# Patient Record
Sex: FEMALE | Race: BLACK OR AFRICAN AMERICAN | NOT HISPANIC OR LATINO | Employment: UNEMPLOYED | URBAN - METROPOLITAN AREA
[De-identification: names, ages, dates, MRNs, and addresses within clinical notes are randomized per-mention and may not be internally consistent; named-entity substitution may affect disease eponyms.]

---
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
## 2021-02-11 ENCOUNTER — HOSPITAL ENCOUNTER (OUTPATIENT)
Dept: LAB | Facility: MEDICAL CENTER | Age: 21
End: 2021-02-11
Attending: INTERNAL MEDICINE
Payer: MEDICAID

## 2021-02-11 LAB
ALBUMIN SERPL BCP-MCNC: 4 G/DL (ref 3.2–4.9)
ALBUMIN/GLOB SERPL: 1.3 G/DL
ALP SERPL-CCNC: 65 U/L (ref 30–99)
ALT SERPL-CCNC: 19 U/L (ref 2–50)
ANION GAP SERPL CALC-SCNC: 10 MMOL/L (ref 7–16)
AST SERPL-CCNC: 25 U/L (ref 12–45)
BILIRUB SERPL-MCNC: 1 MG/DL (ref 0.1–1.5)
BUN SERPL-MCNC: 4 MG/DL (ref 8–22)
CALCIUM SERPL-MCNC: 9 MG/DL (ref 8.5–10.5)
CHLORIDE SERPL-SCNC: 106 MMOL/L (ref 96–112)
CO2 SERPL-SCNC: 21 MMOL/L (ref 20–33)
CREAT SERPL-MCNC: 0.41 MG/DL (ref 0.5–1.4)
GLOBULIN SER CALC-MCNC: 3 G/DL (ref 1.9–3.5)
GLUCOSE SERPL-MCNC: 86 MG/DL (ref 65–99)
LDH SERPL L TO P-CCNC: 323 U/L (ref 107–266)
POTASSIUM SERPL-SCNC: 4.1 MMOL/L (ref 3.6–5.5)
PROT SERPL-MCNC: 7 G/DL (ref 6–8.2)
SODIUM SERPL-SCNC: 137 MMOL/L (ref 135–145)

## 2021-02-11 PROCEDURE — 36415 COLL VENOUS BLD VENIPUNCTURE: CPT

## 2021-02-11 PROCEDURE — 83021 HEMOGLOBIN CHROMOTOGRAPHY: CPT

## 2021-02-11 PROCEDURE — 80053 COMPREHEN METABOLIC PANEL: CPT

## 2021-02-11 PROCEDURE — 84703 CHORIONIC GONADOTROPIN ASSAY: CPT

## 2021-02-11 PROCEDURE — 83615 LACTATE (LD) (LDH) ENZYME: CPT

## 2021-02-12 LAB — HCG SERPL QL: POSITIVE

## 2021-02-13 LAB
HGB A1 MFR BLD: 0 % (ref 95–97.9)
HGB A2 MFR BLD: 3.6 % (ref 2–3.5)
HGB C MFR BLD: 0 % (ref 0–0)
HGB E MFR BLD: 0 % (ref 0–0)
HGB F MFR BLD: 10.1 % (ref 0–2.1)
HGB FRACT BLD ELPH-IMP: ABNORMAL
HGB OTHER MFR BLD: 1.4 % (ref 0–0)
HGB S BLD QL SOLY: ABNORMAL
HGB S MFR BLD: 84.9 % (ref 0–0)
PATH INTERP BLD-IMP: ABNORMAL

## 2021-03-03 ENCOUNTER — HOSPITAL ENCOUNTER (EMERGENCY)
Facility: MEDICAL CENTER | Age: 21
End: 2021-03-03
Attending: EMERGENCY MEDICINE | Admitting: EMERGENCY MEDICINE
Payer: MEDICAID

## 2021-03-03 ENCOUNTER — APPOINTMENT (OUTPATIENT)
Dept: RADIOLOGY | Facility: MEDICAL CENTER | Age: 21
End: 2021-03-03
Attending: EMERGENCY MEDICINE
Payer: MEDICAID

## 2021-03-03 VITALS
RESPIRATION RATE: 16 BRPM | HEIGHT: 62 IN | WEIGHT: 129.85 LBS | OXYGEN SATURATION: 98 % | HEART RATE: 65 BPM | DIASTOLIC BLOOD PRESSURE: 70 MMHG | BODY MASS INDEX: 23.9 KG/M2 | TEMPERATURE: 98.9 F | SYSTOLIC BLOOD PRESSURE: 111 MMHG

## 2021-03-03 DIAGNOSIS — Z3A.12 12 WEEKS GESTATION OF PREGNANCY: ICD-10-CM

## 2021-03-03 DIAGNOSIS — S39.012A STRAIN OF LUMBAR REGION, INITIAL ENCOUNTER: ICD-10-CM

## 2021-03-03 LAB
APPEARANCE UR: CLEAR
COLOR UR: YELLOW
GLUCOSE UR STRIP.AUTO-MCNC: NEGATIVE MG/DL
KETONES UR STRIP.AUTO-MCNC: NEGATIVE MG/DL
LEUKOCYTE ESTERASE UR QL STRIP.AUTO: NEGATIVE
NITRITE UR QL STRIP.AUTO: NEGATIVE
PH UR STRIP.AUTO: 5.5 [PH] (ref 5–8)
PROT UR QL STRIP: NEGATIVE MG/DL
RBC UR QL AUTO: NEGATIVE
SP GR UR STRIP.AUTO: 1.01 (ref 1–1.03)

## 2021-03-03 PROCEDURE — 99283 EMERGENCY DEPT VISIT LOW MDM: CPT

## 2021-03-03 PROCEDURE — 81002 URINALYSIS NONAUTO W/O SCOPE: CPT

## 2021-03-03 PROCEDURE — 76801 OB US < 14 WKS SINGLE FETUS: CPT

## 2021-03-03 RX ORDER — FLUTICASONE PROPIONATE 50 MCG
1 SPRAY, SUSPENSION (ML) NASAL 2 TIMES DAILY
Status: SHIPPED | COMMUNITY
End: 2022-08-04

## 2021-03-03 ASSESSMENT — FIBROSIS 4 INDEX: FIB4 SCORE: 0.35

## 2021-03-03 NOTE — ED PROVIDER NOTES
"ED Provider Note    CHIEF COMPLAINT  Chief Complaint   Patient presents with   • Pregnancy   • Back Pain       HPI  Henry Anand is a 20 y.o. female who presents here with a chief complaint of pregnancy and back pain.  She states her last period was in December, she does not know how far along she is.  She started having lower back pain it is not associated with trauma fever or dysuria.  She also has a history of gallstones and sickle cell anemia.  She currently smokes marijuana denies any other illicit drug use    REVIEW OF SYSTEMS  Positive for back pain, Negative for fever chills or vomiting, all other symtoms are negative  PAST MEDICAL HISTORY   has a past medical history of Gallstones with biliary obstruction (11/2014) and Sickle cell anemia without crisis (HCC) (2001).    SOCIAL HISTORY  Social History     Tobacco Use   • Smoking status: Former Smoker   • Smokeless tobacco: Never Used   Substance and Sexual Activity   • Alcohol use: Not Currently   • Drug use: Not Currently     Types: Inhaled     Comment: marijauna   • Sexual activity: Not on file       SURGICAL HISTORY   has a past surgical history that includes cholecystectomy (11/3/2014) and ercp (N/A, 10/30/2014).    CURRENT MEDICATIONS  Reviewed.  See Encounter Summary.      ALLERGIES  No Known Allergies    PHYSICAL EXAM  VITAL SIGNS: /56   Pulse 66   Temp 37.2 °C (98.9 °F) (Temporal)   Resp 16   Ht 1.575 m (5' 2\")   Wt 58.9 kg (129 lb 13.6 oz)   LMP 07/31/2020 (Exact Date)   SpO2 97%   BMI 23.75 kg/m²   Constitutional:  Alert in no apparent distress.  HENT: Normocephalic, Bilateral external ears normal. Nose normal.   Eyes: Pupils are equal and reactive. Conjunctiva normal, non-icteric.   Thorax & Lungs: Easy unlabored respirations  Abdomen:  No gross signs of peritonitis, no pain with movement, gravid abdomen  Skin: Visualized skin is  Dry, No erythema, No rash.   Extremities:   No edema, No asymmetry  Neurologic: Alert, Grossly " non-focal.   Psychiatric: Affect and Mood normal      COURSE & MEDICAL DECISION MAKING  Nursing notes and vital signs were reviewed. (See chart for details)    The patient presents to the Emergency Department with request to find out how far along her pregnancy is, ultrasound will be obtained, we will also check a urinalysis to make sure she has no evidence of pyelonephritis or infection    US-OB 1ST TRIMESTER WITH TRANSVAGINAL (COMBO)    (Results Pending)   Patient has 12-week intrauterine pregnancy per ultrasound tech  Results for orders placed or performed during the hospital encounter of 03/03/21   POC UA   Result Value Ref Range    POC Color Yellow     POC Appearance Clear     POC Glucose Negative Negative mg/dL    POC Ketones Negative Negative mg/dL    POC Specific Gravity 1.010 1.005 - 1.030    POC Blood Negative Negative    POC Urine PH 5.5 5.0 - 8.0    POC Protein Negative Negative mg/dL    POC Nitrites Negative Negative    POC Leukocyte Esterase Negative Negative         .       The patient was discharged home with an information sheet on pregnancy and follow-up instructions and told to return immediately for any signs or symptoms listed, but specifically if blood in her urine back pain vomiting, or any worsening at all.  The patient verbally agreed to the discharge precautions and follow-up plan which is documented in EPIC.    FINAL IMPRESSION  1.  12-week intrauterine pregnancy  2.   Back pain without evidence of kidney stone or pyelonephritis    Electronically signed by: Claire Salomon M.D., 3/3/2021 12:11 PM

## 2021-03-03 NOTE — ED TRIAGE NOTES
Chief Complaint   Patient presents with   • Pregnancy   • Back Pain      pt complains of bilateral back pain with denies vaginal bleeding. Pt states she recently found out she was pregnant , does not know her INGE.

## 2021-03-03 NOTE — ED NOTES
Med rec updated and complete  Allergies reviewed  Interviewed pt with boyfriend at bedside with permission from pt..

## 2021-04-29 ENCOUNTER — PRE-ADMISSION TESTING (OUTPATIENT)
Dept: ADMISSIONS | Facility: MEDICAL CENTER | Age: 21
End: 2021-04-29
Attending: OTOLARYNGOLOGY
Payer: MEDICAID

## 2021-04-29 RX ORDER — VITAMIN B COMPLEX
1000 TABLET ORAL DAILY
COMMUNITY
End: 2021-10-27 | Stop reason: SDUPTHER

## 2021-04-30 ENCOUNTER — PRE-ADMISSION TESTING (OUTPATIENT)
Dept: ADMISSIONS | Facility: MEDICAL CENTER | Age: 21
End: 2021-04-30
Attending: OTOLARYNGOLOGY
Payer: MEDICAID

## 2021-04-30 DIAGNOSIS — Z01.812 PRE-OPERATIVE LABORATORY EXAMINATION: ICD-10-CM

## 2021-04-30 LAB
COVID ORDER STATUS COVID19: NORMAL
SARS-COV-2 RNA RESP QL NAA+PROBE: NOTDETECTED
SPECIMEN SOURCE: NORMAL

## 2021-04-30 PROCEDURE — C9803 HOPD COVID-19 SPEC COLLECT: HCPCS

## 2021-04-30 PROCEDURE — U0005 INFEC AGEN DETEC AMPLI PROBE: HCPCS

## 2021-04-30 PROCEDURE — U0003 INFECTIOUS AGENT DETECTION BY NUCLEIC ACID (DNA OR RNA); SEVERE ACUTE RESPIRATORY SYNDROME CORONAVIRUS 2 (SARS-COV-2) (CORONAVIRUS DISEASE [COVID-19]), AMPLIFIED PROBE TECHNIQUE, MAKING USE OF HIGH THROUGHPUT TECHNOLOGIES AS DESCRIBED BY CMS-2020-01-R: HCPCS

## 2021-05-04 ENCOUNTER — ANESTHESIA EVENT (OUTPATIENT)
Dept: SURGERY | Facility: MEDICAL CENTER | Age: 21
End: 2021-05-04
Payer: MEDICAID

## 2021-05-04 ENCOUNTER — HOSPITAL ENCOUNTER (OUTPATIENT)
Facility: MEDICAL CENTER | Age: 21
End: 2021-05-04
Attending: OTOLARYNGOLOGY | Admitting: OTOLARYNGOLOGY
Payer: MEDICAID

## 2021-05-04 ENCOUNTER — ANESTHESIA (OUTPATIENT)
Dept: SURGERY | Facility: MEDICAL CENTER | Age: 21
End: 2021-05-04
Payer: MEDICAID

## 2021-05-04 VITALS
WEIGHT: 119.05 LBS | SYSTOLIC BLOOD PRESSURE: 110 MMHG | HEIGHT: 62 IN | BODY MASS INDEX: 21.91 KG/M2 | DIASTOLIC BLOOD PRESSURE: 66 MMHG | HEART RATE: 78 BPM | RESPIRATION RATE: 16 BRPM | TEMPERATURE: 98.1 F | OXYGEN SATURATION: 93 %

## 2021-05-04 DIAGNOSIS — G89.18 POSTOPERATIVE PAIN: ICD-10-CM

## 2021-05-04 PROBLEM — J32.4 CHRONIC PANSINUSITIS: Status: ACTIVE | Noted: 2021-05-04

## 2021-05-04 LAB
HCG UR QL: NEGATIVE
PATHOLOGY CONSULT NOTE: NORMAL

## 2021-05-04 PROCEDURE — C1894 INTRO/SHEATH, NON-LASER: HCPCS | Performed by: OTOLARYNGOLOGY

## 2021-05-04 PROCEDURE — 700111 HCHG RX REV CODE 636 W/ 250 OVERRIDE (IP): Performed by: ANESTHESIOLOGY

## 2021-05-04 PROCEDURE — A9270 NON-COVERED ITEM OR SERVICE: HCPCS | Performed by: OTOLARYNGOLOGY

## 2021-05-04 PROCEDURE — 501838 HCHG SUTURE GENERAL: Performed by: OTOLARYNGOLOGY

## 2021-05-04 PROCEDURE — 160025 RECOVERY II MINUTES (STATS): Performed by: OTOLARYNGOLOGY

## 2021-05-04 PROCEDURE — 88305 TISSUE EXAM BY PATHOLOGIST: CPT | Mod: 59

## 2021-05-04 PROCEDURE — 502573 HCHG PACK, ENT: Performed by: OTOLARYNGOLOGY

## 2021-05-04 PROCEDURE — 160046 HCHG PACU - 1ST 60 MINS PHASE II: Performed by: OTOLARYNGOLOGY

## 2021-05-04 PROCEDURE — 160048 HCHG OR STATISTICAL LEVEL 1-5: Performed by: OTOLARYNGOLOGY

## 2021-05-04 PROCEDURE — A9270 NON-COVERED ITEM OR SERVICE: HCPCS | Performed by: ANESTHESIOLOGY

## 2021-05-04 PROCEDURE — 700101 HCHG RX REV CODE 250: Performed by: ANESTHESIOLOGY

## 2021-05-04 PROCEDURE — 160036 HCHG PACU - EA ADDL 30 MINS PHASE I: Performed by: OTOLARYNGOLOGY

## 2021-05-04 PROCEDURE — 160009 HCHG ANES TIME/MIN: Performed by: OTOLARYNGOLOGY

## 2021-05-04 PROCEDURE — 500331 HCHG COTTONOID, SURG PATTIE: Performed by: OTOLARYNGOLOGY

## 2021-05-04 PROCEDURE — 160029 HCHG SURGERY MINUTES - 1ST 30 MINS LEVEL 4: Performed by: OTOLARYNGOLOGY

## 2021-05-04 PROCEDURE — 160035 HCHG PACU - 1ST 60 MINS PHASE I: Performed by: OTOLARYNGOLOGY

## 2021-05-04 PROCEDURE — 160002 HCHG RECOVERY MINUTES (STAT): Performed by: OTOLARYNGOLOGY

## 2021-05-04 PROCEDURE — 160041 HCHG SURGERY MINUTES - EA ADDL 1 MIN LEVEL 4: Performed by: OTOLARYNGOLOGY

## 2021-05-04 PROCEDURE — 88311 DECALCIFY TISSUE: CPT

## 2021-05-04 PROCEDURE — 700101 HCHG RX REV CODE 250: Performed by: OTOLARYNGOLOGY

## 2021-05-04 PROCEDURE — 700102 HCHG RX REV CODE 250 W/ 637 OVERRIDE(OP): Performed by: OTOLARYNGOLOGY

## 2021-05-04 PROCEDURE — 81025 URINE PREGNANCY TEST: CPT

## 2021-05-04 PROCEDURE — 700102 HCHG RX REV CODE 250 W/ 637 OVERRIDE(OP): Performed by: ANESTHESIOLOGY

## 2021-05-04 PROCEDURE — C1763 CONN TISS, NON-HUMAN: HCPCS | Performed by: OTOLARYNGOLOGY

## 2021-05-04 PROCEDURE — 700111 HCHG RX REV CODE 636 W/ 250 OVERRIDE (IP): Performed by: OTOLARYNGOLOGY

## 2021-05-04 PROCEDURE — 700105 HCHG RX REV CODE 258: Performed by: OTOLARYNGOLOGY

## 2021-05-04 PROCEDURE — 502000 HCHG MISC OR IMPLANTS RC 0278: Performed by: OTOLARYNGOLOGY

## 2021-05-04 DEVICE — KIT CHITOGEL ENDOSCOPIC SINUS SURGERY (1EA): Type: IMPLANTABLE DEVICE | Site: NOSE | Status: FUNCTIONAL

## 2021-05-04 RX ORDER — OXYCODONE HYDROCHLORIDE AND ACETAMINOPHEN 5; 325 MG/1; MG/1
2 TABLET ORAL
Status: COMPLETED | OUTPATIENT
Start: 2021-05-04 | End: 2021-05-04

## 2021-05-04 RX ORDER — EPINEPHRINE 1 MG/ML
INJECTION INTRAMUSCULAR; INTRAVENOUS; SUBCUTANEOUS
Status: DISCONTINUED
Start: 2021-05-04 | End: 2021-05-04 | Stop reason: HOSPADM

## 2021-05-04 RX ORDER — OXYMETAZOLINE HYDROCHLORIDE 0.05 G/100ML
SPRAY NASAL
Status: DISCONTINUED
Start: 2021-05-04 | End: 2021-05-04 | Stop reason: HOSPADM

## 2021-05-04 RX ORDER — EPINEPHRINE 1 MG/ML(1)
AMPUL (ML) INJECTION
Status: DISCONTINUED
Start: 2021-05-04 | End: 2021-05-04 | Stop reason: HOSPADM

## 2021-05-04 RX ORDER — HYDROCODONE BITARTRATE AND ACETAMINOPHEN 5; 325 MG/1; MG/1
1 TABLET ORAL EVERY 4 HOURS PRN
Qty: 15 TABLET | Refills: 0 | Status: SHIPPED | OUTPATIENT
Start: 2021-05-04 | End: 2021-05-09

## 2021-05-04 RX ORDER — CEFAZOLIN SODIUM 1 G/3ML
INJECTION, POWDER, FOR SOLUTION INTRAMUSCULAR; INTRAVENOUS PRN
Status: DISCONTINUED | OUTPATIENT
Start: 2021-05-04 | End: 2021-05-04 | Stop reason: SURG

## 2021-05-04 RX ORDER — LIDOCAINE HYDROCHLORIDE 10 MG/ML
INJECTION, SOLUTION INFILTRATION; PERINEURAL
Status: DISCONTINUED
Start: 2021-05-04 | End: 2021-05-04 | Stop reason: HOSPADM

## 2021-05-04 RX ORDER — PREDNISONE 10 MG/1
TABLET ORAL
Qty: 24 TABLET | Refills: 0 | Status: SHIPPED | OUTPATIENT
Start: 2021-05-04 | End: 2021-05-08

## 2021-05-04 RX ORDER — SODIUM CHLORIDE, SODIUM LACTATE, POTASSIUM CHLORIDE, CALCIUM CHLORIDE 600; 310; 30; 20 MG/100ML; MG/100ML; MG/100ML; MG/100ML
INJECTION, SOLUTION INTRAVENOUS CONTINUOUS
Status: DISCONTINUED | OUTPATIENT
Start: 2021-05-04 | End: 2021-05-04 | Stop reason: HOSPADM

## 2021-05-04 RX ORDER — DIPHENHYDRAMINE HYDROCHLORIDE 50 MG/ML
12.5 INJECTION INTRAMUSCULAR; INTRAVENOUS
Status: DISCONTINUED | OUTPATIENT
Start: 2021-05-04 | End: 2021-05-04 | Stop reason: HOSPADM

## 2021-05-04 RX ORDER — HYDROMORPHONE HYDROCHLORIDE 1 MG/ML
0.2 INJECTION, SOLUTION INTRAMUSCULAR; INTRAVENOUS; SUBCUTANEOUS
Status: DISCONTINUED | OUTPATIENT
Start: 2021-05-04 | End: 2021-05-04 | Stop reason: HOSPADM

## 2021-05-04 RX ORDER — HYDROMORPHONE HYDROCHLORIDE 1 MG/ML
0.4 INJECTION, SOLUTION INTRAMUSCULAR; INTRAVENOUS; SUBCUTANEOUS
Status: DISCONTINUED | OUTPATIENT
Start: 2021-05-04 | End: 2021-05-04 | Stop reason: HOSPADM

## 2021-05-04 RX ORDER — LIDOCAINE HYDROCHLORIDE AND EPINEPHRINE 10; 10 MG/ML; UG/ML
INJECTION, SOLUTION INFILTRATION; PERINEURAL
Status: DISCONTINUED | OUTPATIENT
Start: 2021-05-04 | End: 2021-05-04 | Stop reason: HOSPADM

## 2021-05-04 RX ORDER — HALOPERIDOL 5 MG/ML
1 INJECTION INTRAMUSCULAR
Status: DISCONTINUED | OUTPATIENT
Start: 2021-05-04 | End: 2021-05-04 | Stop reason: HOSPADM

## 2021-05-04 RX ORDER — SODIUM CHLORIDE, SODIUM LACTATE, POTASSIUM CHLORIDE, CALCIUM CHLORIDE 600; 310; 30; 20 MG/100ML; MG/100ML; MG/100ML; MG/100ML
INJECTION, SOLUTION INTRAVENOUS CONTINUOUS
Status: ACTIVE | OUTPATIENT
Start: 2021-05-04 | End: 2021-05-04

## 2021-05-04 RX ORDER — ROCURONIUM BROMIDE 10 MG/ML
INJECTION, SOLUTION INTRAVENOUS PRN
Status: DISCONTINUED | OUTPATIENT
Start: 2021-05-04 | End: 2021-05-04 | Stop reason: SURG

## 2021-05-04 RX ORDER — ONDANSETRON 2 MG/ML
INJECTION INTRAMUSCULAR; INTRAVENOUS PRN
Status: DISCONTINUED | OUTPATIENT
Start: 2021-05-04 | End: 2021-05-04 | Stop reason: SURG

## 2021-05-04 RX ORDER — MEPERIDINE HYDROCHLORIDE 25 MG/ML
6.25 INJECTION INTRAMUSCULAR; INTRAVENOUS; SUBCUTANEOUS
Status: DISCONTINUED | OUTPATIENT
Start: 2021-05-04 | End: 2021-05-04 | Stop reason: HOSPADM

## 2021-05-04 RX ORDER — OXYCODONE HYDROCHLORIDE AND ACETAMINOPHEN 5; 325 MG/1; MG/1
1 TABLET ORAL
Status: COMPLETED | OUTPATIENT
Start: 2021-05-04 | End: 2021-05-04

## 2021-05-04 RX ORDER — LIDOCAINE HYDROCHLORIDE 20 MG/ML
INJECTION, SOLUTION EPIDURAL; INFILTRATION; INTRACAUDAL; PERINEURAL PRN
Status: DISCONTINUED | OUTPATIENT
Start: 2021-05-04 | End: 2021-05-04 | Stop reason: SURG

## 2021-05-04 RX ORDER — HYDROMORPHONE HYDROCHLORIDE 1 MG/ML
0.1 INJECTION, SOLUTION INTRAMUSCULAR; INTRAVENOUS; SUBCUTANEOUS
Status: DISCONTINUED | OUTPATIENT
Start: 2021-05-04 | End: 2021-05-04 | Stop reason: HOSPADM

## 2021-05-04 RX ORDER — PREDNISONE 10 MG/1
TABLET ORAL
Qty: 24 TABLET | Refills: 0 | Status: SHIPPED | OUTPATIENT
Start: 2021-05-04 | End: 2021-05-04 | Stop reason: SDUPTHER

## 2021-05-04 RX ORDER — EPINEPHRINE 1 MG/ML(1)
AMPUL (ML) INJECTION
Status: DISCONTINUED | OUTPATIENT
Start: 2021-05-04 | End: 2021-05-04 | Stop reason: HOSPADM

## 2021-05-04 RX ORDER — DEXAMETHASONE SODIUM PHOSPHATE 4 MG/ML
INJECTION, SOLUTION INTRA-ARTICULAR; INTRALESIONAL; INTRAMUSCULAR; INTRAVENOUS; SOFT TISSUE PRN
Status: DISCONTINUED | OUTPATIENT
Start: 2021-05-04 | End: 2021-05-04 | Stop reason: SURG

## 2021-05-04 RX ORDER — OXYMETAZOLINE HYDROCHLORIDE 0.05 G/100ML
SPRAY NASAL
Status: DISCONTINUED | OUTPATIENT
Start: 2021-05-04 | End: 2021-05-04 | Stop reason: HOSPADM

## 2021-05-04 RX ADMIN — DEXAMETHASONE SODIUM PHOSPHATE 8 MG: 4 INJECTION, SOLUTION INTRA-ARTICULAR; INTRALESIONAL; INTRAMUSCULAR; INTRAVENOUS; SOFT TISSUE at 08:06

## 2021-05-04 RX ADMIN — SODIUM CHLORIDE, POTASSIUM CHLORIDE, SODIUM LACTATE AND CALCIUM CHLORIDE: 600; 310; 30; 20 INJECTION, SOLUTION INTRAVENOUS at 07:26

## 2021-05-04 RX ADMIN — CEFAZOLIN 2 G: 330 INJECTION, POWDER, FOR SOLUTION INTRAMUSCULAR; INTRAVENOUS at 07:53

## 2021-05-04 RX ADMIN — ONDANSETRON 4 MG: 2 INJECTION INTRAMUSCULAR; INTRAVENOUS at 10:15

## 2021-05-04 RX ADMIN — ROCURONIUM BROMIDE 35 MG: 10 INJECTION, SOLUTION INTRAVENOUS at 07:57

## 2021-05-04 RX ADMIN — FENTANYL CITRATE 50 MCG: 50 INJECTION, SOLUTION INTRAMUSCULAR; INTRAVENOUS at 08:50

## 2021-05-04 RX ADMIN — HALOPERIDOL LACTATE 1 MG: 5 INJECTION, SOLUTION INTRAMUSCULAR at 11:38

## 2021-05-04 RX ADMIN — POVIDONE IODINE 15 ML: 100 SOLUTION TOPICAL at 07:26

## 2021-05-04 RX ADMIN — LIDOCAINE HYDROCHLORIDE 2 ML: 20 INJECTION, SOLUTION EPIDURAL; INFILTRATION; INTRACAUDAL at 07:57

## 2021-05-04 RX ADMIN — FENTANYL CITRATE 25 MCG: 50 INJECTION, SOLUTION INTRAMUSCULAR; INTRAVENOUS at 12:09

## 2021-05-04 RX ADMIN — FENTANYL CITRATE 50 MCG: 50 INJECTION, SOLUTION INTRAMUSCULAR; INTRAVENOUS at 10:14

## 2021-05-04 RX ADMIN — FENTANYL CITRATE 50 MCG: 50 INJECTION, SOLUTION INTRAMUSCULAR; INTRAVENOUS at 10:19

## 2021-05-04 RX ADMIN — FENTANYL CITRATE 50 MCG: 50 INJECTION, SOLUTION INTRAMUSCULAR; INTRAVENOUS at 08:34

## 2021-05-04 RX ADMIN — FENTANYL CITRATE 50 MCG: 50 INJECTION, SOLUTION INTRAMUSCULAR; INTRAVENOUS at 08:56

## 2021-05-04 RX ADMIN — PROPOFOL 150 MG: 10 INJECTION, EMULSION INTRAVENOUS at 07:57

## 2021-05-04 RX ADMIN — FENTANYL CITRATE 100 MCG: 50 INJECTION, SOLUTION INTRAMUSCULAR; INTRAVENOUS at 07:55

## 2021-05-04 RX ADMIN — OXYCODONE HYDROCHLORIDE AND ACETAMINOPHEN 1 TABLET: 5; 325 TABLET ORAL at 12:09

## 2021-05-04 ASSESSMENT — PAIN DESCRIPTION - PAIN TYPE
TYPE: SURGICAL PAIN

## 2021-05-04 ASSESSMENT — FIBROSIS 4 INDEX
FIB4 SCORE: 0.35
FIB4 SCORE: 0.35

## 2021-05-04 ASSESSMENT — PAIN SCALES - GENERAL: PAIN_LEVEL: 0

## 2021-05-04 NOTE — OR NURSING
1214 - Report to BRITTANY Stark.  Pt stable to go to phase II. States pain more obvious than prior, medicated before taking to phase II.  Pt still with some small amount bleeding to washcloth, nausea in control.  Pt able to ambulate with assistance from gurney to recliner chair in phase II.

## 2021-05-04 NOTE — OR NURSING
1215:  Report from Cory Fournier.  Pt to Stage 2 PACU.  Pt ambulatory to chair.  Minimal bleeding from left nare, none noted from right.  Pt refusing nasal sling stating she can't breath from her mouth.  Pt c/o increased pain with movement.  Once sitting, pt drifts to sleep.     1230:  No nasal drainage noted while sleeping.    1234:  Mom at bedside.  DC instructions reviewed with mom  and questions answered.    1240:  Pt oob ambulatory to BR.  Voids without difficulty.  Steady gait.  Slight oozing from left nare with ambulation.      1300:  [facility]

## 2021-05-04 NOTE — DISCHARGE INSTRUCTIONS
ACTIVITY: Rest and take it easy for the first 24 hours.  A responsible adult is recommended to remain with you during that time.  It is normal to feel sleepy.  We encourage you to not do anything that requires balance, judgment or coordination.    MILD FLU-LIKE SYMPTOMS ARE NORMAL. YOU MAY EXPERIENCE GENERALIZED MUSCLE ACHES, THROAT IRRITATION, HEADACHE AND/OR SOME NAUSEA.    FOR 24 HOURS DO NOT:  Drive, operate machinery or run household appliances.  Drink beer or alcoholic beverages.   Make important decisions or sign legal documents.    SPECIAL INSTRUCTIONS: See Handout    DIET: To avoid nausea, slowly advance diet as tolerated, avoiding spicy or greasy foods for the first day.  Add more substantial food to your diet according to your physician's instructions.  Babies can be fed formula or breast milk as soon as they are hungry.  INCREASE FLUIDS AND FIBER TO AVOID CONSTIPATION.    SURGICAL DRESSING/BATHING: OK to shower tomorrow, avoid hot/steamy showers as this can increase bleeding.  Do not submerge head in water until ok with doctor.     FOLLOW-UP APPOINTMENT:  Follow up with Dr. Mackenzie on 5/12 at 1pm    You should CALL YOUR PHYSICIAN if you develop:  Fever greater than 101 degrees F.  Pain not relieved by medication, or persistent nausea or vomiting.  Excessive bleeding (blood soaking through dressing) or unexpected drainage from the wound.  Extreme redness or swelling around the incision site, drainage of pus or foul smelling drainage.  Inability to urinate or empty your bladder within 8 hours.  Problems with breathing or chest pain.    You should call 911 if you develop problems with breathing or chest pain.  If you are unable to contact your doctor or surgical center, you should go to the nearest emergency room or urgent care center.    Physician's telephone #: Dr. Mackenzie [phone]    If any questions arise, call your doctor.  If your doctor is not available, please feel free to call the Surgical  Center at [phone]. The Contact Center is open Monday through Friday 7AM to 5PM and may speak to a nurse at [phone], or toll free at [phone].     A registered nurse may call you a few days after your surgery to see how you are doing after your procedure.    MEDICATIONS: Resume taking daily medication.  Take prescribed pain medication with food.  If no medication is prescribed, you may take non-aspirin pain medication if needed.  PAIN MEDICATION CAN BE VERY CONSTIPATING.  Take a stool softener or laxative such as senokot, pericolace, or milk of magnesia if needed.    Prescription given for NORCO and prednisone.  Last pain medication given at none.    If your physician has prescribed pain medication that includes Acetaminophen (Tylenol), do not take additional Acetaminophen (Tylenol) while taking the prescribed medication.    Depression / Suicide Risk    As you are discharged from this [facility] facility, it is important to learn how to keep safe from harming yourself.    Recognize the warning signs:  · Abrupt changes in personality, positive or negative- including increase in energy   · Giving away possessions  · Change in eating patterns- significant weight changes-  positive or negative  · Change in sleeping patterns- unable to sleep or sleeping all the time   · Unwillingness or inability to communicate  · Depression  · Unusual sadness, discouragement and loneliness  · Talk of wanting to die  · Neglect of personal appearance   · Rebelliousness- reckless behavior  · Withdrawal from people/activities they love  · Confusion- inability to concentrate     If you or a loved one observes any of these behaviors or has concerns about self-harm, here's what you can do:  · Talk about it- your feelings and reasons for harming yourself  · Remove any means that you might use to hurt yourself (examples: pills, rope, extension cords, firearm)  · Get professional help from the community (Mental Health,  Substance Abuse, psychological counseling)  · Do not be alone:Call your Safe Contact- someone whom you trust who will be there for you.  · Call your local CRISIS HOTLINE 541-8942 or 836-823-3889  · Call your local Children's Mobile Crisis Response Team [place] (886) 384-0940 or www.[employer]  · Call the toll free National Suicide Prevention Hotlines   · National Suicide Prevention Lifeline 061-757-QGUZ (2290)  · National Hope Line Network 800-SUICIDE (871-6234)

## 2021-05-04 NOTE — OR NURSING
1055 Pt to PACU from OR. Report from anesthesia and OR RN. On RA, satting 90, attempted to place mask back on, pt screams and pulls away from face, placed on chest for blow by. Respirations even and unlabored. VSS. Dried old blood to nares, no fresh bleeding.    1110 Changed into cloth gown, declines pain. Tolerating PO.     1138 Medicated for nausea.     1153 Nausea improved. Mother called for updates, will pick pt up in 30-45 min.     1155 Report to BRITTANY Fournier, for lunch coverage.

## 2021-05-04 NOTE — ANESTHESIA POSTPROCEDURE EVALUATION
Patient: Henry Anand    Procedure Summary     Date: 05/04/21 Room / Location: [facility] ROOM 22 / SURGERY SAME DAY [facility]    Anesthesia Start: 0749 Anesthesia Stop: 1056    Procedures:       SEPTOPLASTY, NOSE, WITH TURBINOPLASTY (Bilateral Nose)      MAXILLARY ANTROSTOMY-ENDOSCOPIC (Nose)      ETHMOIDECTOMY, ENDOSCOPIC-FRONTAL, LANDMARK. 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 Responsible Provider: Shan Zavala M.D.    Anesthesia Type: general ASA Status: 2          Final Anesthesia Type: general  Last vitals  BP   Blood Pressure: 119/53    Temp   37.2 °C (98.9 °F)    Pulse   80   Resp   18    SpO2   97 %      Anesthesia Post Evaluation    Patient location during evaluation: PACU  Patient participation: complete - patient participated  Level of consciousness: awake and alert  Pain score: 0    Airway patency: patent  Anesthetic complications: no  Cardiovascular status: hemodynamically stable  Respiratory status: acceptable  Hydration status: euvolemic    PONV: none          No complications documented.     Nurse Pain Score: 3 (NPRS)

## 2021-05-04 NOTE — OP REPORT
DATE OF SERVICE:  05/04/2021     PREOPERATIVE DIAGNOSES:  1.  Bilateral chronic maxillary sinusitis  2.  Bilateral chronic ethmoid sinusitis.  3.  Bilateral chronic frontal sinusitis.  4.  Nasal septal deviation.  5.  Inferior turbinate hypertrophy.  6.  History of facial fractures.     POSTOPERATIVE DIAGNOSES:  1.  Bilateral chronic maxillary sinusitis  2.  Bilateral chronic ethmoid sinusitis.  3.  Bilateral chronic frontal sinusitis.  4.  Nasal septal deviation.  5.  Inferior turbinate hypertrophy.  6.  History of facial fractures.     OPERATIONS:  1.  Bilateral maxillary antrostomy.  2.  Bilateral frontal ethmoidectomy.  3.  Septoplasty.  4.  Inferior turbinate submucous resection with outfracture.  5.  Utilization of image guidance, extradural.     ANESTHESIA:  General.     ANESTHESIOLOGIST:  Shan Zavala MD     COMPLICATIONS:  None.     SPECIMENS:  Right and left sinus contents to pathology.     ESTIMATED BLOOD LOSS:  150 mL.     FINDINGS:  1.  Severe septal deviation to the left with multiple scar bands from the   septum to the sidewall.  2.  Significant scarring and edema near the axilla of the middle turbinate and   within the middle meatus.  2.  Polypoid edema extending into the frontal recesses, right greater than   left.     INDICATIONS FOR PROCEDURE:  The patient is a 20-year-old female with a remote   history of facial fractures as a child that required an open repair with   placement of plates.  She unfortunately has since struggled with progressive   nasal congestion, obstruction, chronic facial pressure and drainage despite a   rather aggressive medical therapy.  As such, the above-stated procedures were   offered and she desired strongly to proceed.  Surgery was discussed in detail.    Risks were discussed including, but not limited to pain, bleeding,   infection, scar formation, damage to the orbit or skull base, change in sense   of smell, nasal septal perforation, failure to improve,  recurrence of symptoms   and need for further procedures.  Informed surgical consent was obtained.     DESCRIPTION OF PROCEDURE:  The patient was met in the preoperative holding   area and again the consent and history were reviewed.  She was brought to the   operating room in good condition after appropriate anesthetic markers were   placed, a surgical time out was taken and general endotracheal anesthesia was   induced.  The bed was then turned 180 degrees.  Epinephrine-soaked cottonoids   were placed into the nose for decongestion and 1% lidocaine with epinephrine   was infiltrated into the caudal edge of the septum bilaterally.  The image   guidance navigation system was calibrated and noted to be functioning   properly.  The patient was then prepped and draped in the usual fashion.  A 0   degree Johnson bert was used to visualize the nasal airway with the findings as   noted above.  Again, there was significant scar banding on the left from the   septum to the sidewall.  A hemitransfixion incision was placed on the left and   this was carried down to the cartilage.  Mucoperichondrial flap on the left   hand side was then elevated in a subperichondrial plane.  A transcartilaginous   incision was then made and the right flap was also elevated.  There was a   small rent on the right hand side with the left flap was entirely intact.  All   the deviated portions of the cartilage and bone were removed to create a   straight septum.  Care was taken to not dissect too far superiorly given her   prior facial fractures and a presumed skull base injury.  I then placed a   small piece of the cartilage back into the septal flaps and turned my   attention towards the sinuses.  I first started on the right hand side.  The   middle turbinate was hypertrophied and quite sclerotic and stiff.  This was   gently medialized.  The uncinate was teased forward and completely removed.    There was rather significant fibrosis and  osteolytic changes throughout this   area with some thick secretions.  The natural ostia was identified and   incorporated into a large antrostomy.  The ethmoid bulla was taken down   followed by the basal lamella.  The anterior and posterior ethmoids were   partially dissected into the skull base and orbit were able to be identified   posteriorly.  Dissection continued from posterior to anterior, removing all   ethmoid partitions.  The location of the anterior ethmoid artery was   identified and preserved and image guidance was used to confirm known   landmarks.  I then dissected within the frontal recess itself using angled   instruments.  There was significant edema in this area and multiple partitions   which made the dissection somewhat challenging, but the sinus was able to be   opened widely.  All bleeding was controlled using epinephrine-soaked   cottonoids.  The same procedure was completed on contralateral side.  There   were similar findings of osteolytic changes and very firm sclerotic bone.  The   uncinate was teased forward and completely removed.  The natural ostia was   incorporated into a large antrostomy.  The ethmoid bulla was taken down   followed by the basal lamella.  The orbit and skull base were identified   posteriorly and dissection continued from posterior to anterior, removing all   ethmoid partitions.  Again, care was taken to confirm the location of the   anterior ethmoid artery and other known landmarks utilizing image guidance as   needed.  I then dissected within the frontal recess and the frontal sinus was   opened widely.  Again, all bleeding was controlled using epinephrine-soaked   cottonoids.  The middle turbinates themselves were quite hypertrophic and   these were trimmed down and gently cauterized.  I then turned my attention   towards the inferior turbinates.  A stab incision was made in the head of the   turbinates bilaterally and soft tissue was elevated using a caudal.  A    microdebrider was then used to reduce the size of the turbinates in a   submucosal fashion.  On the left hand side, the turbinate was very severely   enlarged and the inferior portion of this was gently trimmed.  Hemostasis was   obtained using a suction Bovie electrocautery and epinephrine-soaked   cottonoids.  The hemitransfixion incision was closed using a 5-0 fast and a   4-0 plain gut on a Tab needle was used in a standard quilting fashion.    Chitogel was then placed into the sinus cavities on both sides and the   procedure was terminated.  Care of the patient was direct over to anesthesia   for emergence extubation.  She was taken to PACU in stable condition.  There   were no apparent complications and all instrument counts were correct x2 at   the completion of the case.        ______________________________  MD SOFIA Galan/HARPREET    DD:  05/04/2021 11:31  DT:  05/04/2021 12:24    Job#:  [id number]

## 2021-05-04 NOTE — ANESTHESIA TIME REPORT
Anesthesia Start and Stop Event Times     Date Time Event    5/4/2021 0721 Ready for Procedure     0749 Anesthesia Start     1056 Anesthesia Stop        Responsible Staff  05/04/21    Name Role Begin End    Shan Zavala M.D. Anesth 0749 1056        Preop Diagnosis (Free Text):  Pre-op Diagnosis     DEVIATED NASAL SEPTUM  HYPERTROPHY OF NASAL TURBINATES  CHRONIC MAXILLART SINU        Preop Diagnosis (Codes):    Post op Diagnosis  Chronic sinusitis      Premium Reason  Non-Premium    Comments:

## 2021-05-04 NOTE — OR SURGEON
Immediate Post OP Note    PreOp Diagnosis:   1. Bilateral chronic maxillary sinusitis  2. Bilateral chronic ethmoid sinusitis  3. Bilateral chronic frontal sinusitis  4. Nasal septal deviation  5. Inferior turbinate hypertrophy      PostOp Diagnosis:   1. Bilateral chronic maxillary sinusitis  2. Bilateral chronic ethmoid sinusitis  3. Bilateral chronic frontal sinusitis  4. Nasal septal deviation  5. Inferior turbinate hypertrophy    Procedure(s):  SEPTOPLASTY, NOSE, WITH TURBINOPLASTY - Wound Class: Clean Contaminated  MAXILLARY ANTROSTOMY-ENDOSCOPIC - Wound Class: Clean Contaminated  ETHMOIDECTOMY, ENDOSCOPIC-FRONTAL, LANDMARK. - Wound Class: Clean Contaminated    Surgeon(s):  Precious Mackenzie M.D.    Anesthesiologist/Type of Anesthesia:  Anesthesiologist: Shan Zavala M.D./General    Surgical Staff:  Circulator: Becky Baca R.N.  Relief Circulator: Anita A Reyes, R.N.  Relief Scrub: Beatrice Whitley  Scrub Person: Juanita Yen    Specimens removed if any:  ID Type Source Tests Collected by Time Destination   A : left sinus contents Tissue Sinus PATHOLOGY SPECIMEN Precious Mackenzie M.D. 5/4/2021 10:15 AM    B : right sinus contents Tissue Sinus PATHOLOGY SPECIMEN Precious Mackenzie M.D. 5/4/2021 10:38 AM        Estimated Blood Loss: 150cc    Findings: see op note    Complications: none    [id number]    5/4/2021 10:43 AM Precious Mackenzie M.D.

## 2021-05-04 NOTE — ANESTHESIA PROCEDURE NOTES
Airway    Date/Time: 5/4/2021 7:59 AM  Performed by: Shan Zavala M.D.  Authorized by: Shan Zavala M.D.     Location:  OR  Urgency:  Elective  Indications for Airway Management:  Anesthesia      Spontaneous Ventilation: absent    Sedation Level:  Deep  Preoxygenated: Yes    Patient Position:  Sniffing  Final Airway Type:  Endotracheal airway  Final Endotracheal Airway:  ETT and SUSHIL tube  Cuffed: Yes    Technique Used for Successful ETT Placement:  Direct laryngoscopy    Insertion Site:  Oral  Blade Type:  Nancy  Laryngoscope Blade/Videolaryngoscope Blade Size:  3  ETT Size (mm):  6.5  Measured from:  Teeth  ETT to Teeth (cm):  21  Placement Verified by: auscultation and capnometry    Cormack-Lehane Classification:  Grade IIa - partial view of glottis  Number of Attempts at Approach:  1  Number of Other Approaches Attempted:  0

## 2021-05-04 NOTE — ANESTHESIA PREPROCEDURE EVALUATION
Relevant Problems   PULMONARY   (+) Asthma, exercise induced      Other   (+) Nasoorbitoethmoid fracture (HCC)   (+) Sickle cell anemia without crisis (HCC)       Physical Exam    Airway   Mallampati: II  TM distance: >3 FB  Neck ROM: full       Cardiovascular - normal exam  Rhythm: regular  Rate: normal  (-) murmur     Dental - normal exam           Pulmonary - normal exam  Breath sounds clear to auscultation     Abdominal    Neurological - normal exam                 Anesthesia Plan    ASA 2       Plan - general       Airway plan will be ETT          Induction: intravenous    Postoperative Plan: Postoperative administration of opioids is intended.    Pertinent diagnostic labs and testing reviewed    Informed Consent:    Anesthetic plan and risks discussed with patient.    Use of blood products discussed with: patient whom consented to blood products.

## 2021-10-27 RX ORDER — VITAMIN B COMPLEX
1000 TABLET ORAL DAILY
Qty: 30 TABLET | Refills: 0 | Status: SHIPPED | OUTPATIENT
Start: 2021-10-27 | End: 2022-08-04

## 2021-10-27 NOTE — TELEPHONE ENCOUNTER
Last seen: 11/23/20 by Dr. Castro  Next appt: None    Was the patient seen in the last year in this department? Yes   Does patient have an active prescription for medications requested? No   Received Request Via: Pharmacy

## 2021-12-14 RX ORDER — ALBUTEROL SULFATE 90 UG/1
AEROSOL, METERED RESPIRATORY (INHALATION)
Qty: 6.7 EACH | Refills: 4 | Status: SHIPPED | OUTPATIENT
Start: 2021-12-14 | End: 2021-12-15

## 2021-12-15 RX ORDER — ALBUTEROL SULFATE 90 UG/1
AEROSOL, METERED RESPIRATORY (INHALATION)
Qty: 6.7 EACH | Refills: 4 | Status: SHIPPED | OUTPATIENT
Start: 2021-12-15 | End: 2022-08-04 | Stop reason: SDUPTHER

## 2021-12-21 ENCOUNTER — APPOINTMENT (OUTPATIENT)
Dept: MEDICAL GROUP | Facility: CLINIC | Age: 21
End: 2021-12-21
Payer: MEDICAID

## 2021-12-27 ENCOUNTER — APPOINTMENT (OUTPATIENT)
Dept: MEDICAL GROUP | Facility: OTHER | Age: 21
End: 2021-12-27
Payer: MEDICAID

## 2022-02-02 ENCOUNTER — OFFICE VISIT (OUTPATIENT)
Dept: SLEEP MEDICINE | Facility: MEDICAL CENTER | Age: 22
End: 2022-02-02
Payer: MEDICAID

## 2022-02-02 VITALS
RESPIRATION RATE: 16 BRPM | SYSTOLIC BLOOD PRESSURE: 100 MMHG | BODY MASS INDEX: 21.16 KG/M2 | WEIGHT: 115 LBS | HEIGHT: 62 IN | DIASTOLIC BLOOD PRESSURE: 60 MMHG | OXYGEN SATURATION: 95 % | HEART RATE: 75 BPM

## 2022-02-02 DIAGNOSIS — G47.30 BREATHING-RELATED SLEEP DISORDER: ICD-10-CM

## 2022-02-02 DIAGNOSIS — R53.82 CHRONIC FATIGUE, UNSPECIFIED: ICD-10-CM

## 2022-02-02 DIAGNOSIS — G47.8 NOCTURNAL SLEEP-RELATED EATING DISORDER: ICD-10-CM

## 2022-02-02 DIAGNOSIS — R06.83 SNORING: ICD-10-CM

## 2022-02-02 PROCEDURE — 99204 OFFICE O/P NEW MOD 45 MIN: CPT | Performed by: PREVENTIVE MEDICINE

## 2022-02-02 RX ORDER — TEMAZEPAM 7.5 MG/1
7.5 CAPSULE ORAL NIGHTLY PRN
Qty: 2 CAPSULE | Refills: 0 | Status: SHIPPED | OUTPATIENT
Start: 2022-02-02 | End: 2022-08-04

## 2022-02-02 ASSESSMENT — FIBROSIS 4 INDEX: FIB4 SCORE: 0.37

## 2022-02-02 NOTE — PROGRESS NOTES
"  CC: \" I guess I need a sleep study.\"    HPI:  Henry Annad is a 21 y.o. female kindly referred by Cullen Castro M.D. this patient has medical history history of trauma, multiple facial, exercise-induced asthma, anxiety,and sickle cell anemia.    Female with a pmhx of sickle cell and asthma  who was admitted on 3/2/2018 for facial fractures secondary to a slip and fall. She was at [employer] when she asked a  for a ride when she missed her bus. She then arrived at her apartment and slipped and fell on the edge of the first stair..She was hospitalized and it was determined to have a severe deformity of the DIMAS.Depressed posteriorly severely.  Nasal bones displaced posteriorly.  Severe defect. Telecanthus and severe widening of the entire nasoorbitoethmoid complex..  She was transferred to [facility] for surgical repair of her facial fractures..    Her facial fractures are healing recent imaging:  CT Sinuses without Contrast (03/05/2020 3:59 PM PST)  CT Sinuses without Contrast (03/05/2020 3:59 PM PST)   Specimen         CT Sinuses without Contrast (03/05/2020 3:59 PM PST)   Impressions Performed At   Bilateral frontal, ethmoid, and left maxillary sinus disease as described above.  No evidence of acute sinusitis is identified.       CHO RAD       CT Sinuses without Contrast (03/05/2020 3:59 PM PST)   Narrative Performed At   EXAMINATION DESCRIPTION:    CT SINUSES WITHOUT CONTRAST        INDICATION(S):    Chronic sinusitis versus sinus drainage obstruction        TECHNIQUE:    Reformatted 1 millimeter axial, sagittal, and coronal images of the paranasal sinuses are obtained from the mid forehead through the maxilla.  No intravenous contrast is administered.          COMPARISON:    No prior study is available.        FINDINGS:    The ostiomeatal complexes are patent bilaterally.  The right maxillary sinus is well aerated.  The left maxillary sinus contains a 1.1 x " 2.0 cm mucous retention cyst.  The sphenoid and left posterior ethmoid sinuses largely are well aerated.  The remaining ethmoid and frontal sinuses are partially opacified.  No air-fluid level is identified.  Only minimal (1 to 2 millimeter) wavy nasal septal deviation is identified.  Radiopaque metallic hardware projecting within the frontal sinus anterior table, nasal bone surface, and left anterior maxillary sinus are compatible with prior surgical reconstruction.        The mastoid sinuses, middle ears, and external auditory canals are clear.       CHO RAD       CT Sinuses without Contrast (03/05/2020 3:59 PM PST)   Procedure Note   John, Rad Results In - 03/05/2020 4:06 PM PST    EXAMINATION DESCRIPTION:  CT SINUSES WITHOUT CONTRAST    INDICATION(S):  Chronic sinusitis versus sinus drainage obstruction    TECHNIQUE:  Reformatted 1 millimeter axial, sagittal, and coronal images of the paranasal sinuses are obtained from the mid forehead through the maxilla. No intravenous contrast is administered.        COMPARISON:  No prior study is available.    FINDINGS:  The ostiomeatal complexes are patent bilaterally. The right maxillary sinus is well aerated. The left maxillary sinus contains a 1.1 x 2.0 cm mucous retention cyst. The sphenoid and left posterior ethmoid sinuses largely are well aerated. The remaining ethmoid and frontal sinuses are partially opacified. No air-fluid level is identified. Only minimal (1 to 2 millimeter) wavy nasal septal deviation is identified. Radiopaque metallic hardware projecting within the frontal sinus anterior table, nasal bone surface, and left anterior maxillary sinus are compatible with prior surgical reconstruction.    The mastoid sinuses, middle ears, and external auditory canals are clear.    IMPRESSION:   Bilateral frontal, ethmoid, and left maxillary sinus disease as described above. No evidence of acute sinusitis is identified.         She is being seen today in our Sleep  Medicine Clinic because of poor sleep quality and possible breathing related sleep disorders.  She gives a history of sleep walking and sleep singing when she was younger.  Also she reports loud snoring, insomnia, daytime sleepiness,nightmares,poor quality sleep.  [place] score 8/24..She is unemployed.  She is a never tobacco user but she does smoke 2 joints per day and she does drink caffenated beverages. She also reports irritabilty,as well as feeling anxious and grinding her teeth at night.      Significant comorbidities and modifying factors include:  See HPI     Patient Active Problem List    Diagnosis Date Noted   • Chronic pansinusitis 05/04/2021   • Facial trauma 03/03/2018   • Nasoorbitoethmoid fracture (HCC) 03/03/2018   • Asthma, exercise induced 01/05/2016   • Sickle cell anemia without crisis (Formerly [facility]) 2001       Past Medical History:   Diagnosis Date   • Asthma 2021    asthma   • Gallstones with biliary obstruction 11/2014   • Heart murmur    • Sickle cell anemia without crisis (Formerly [facility]) 2001        Past Surgical History:   Procedure Laterality Date   • SEPTOTURBINOPLASTY Bilateral 5/4/2021    Procedure: SEPTOPLASTY, NOSE, WITH TURBINOPLASTY;  Surgeon: Precious Mackenzie M.D.;  Location: SURGERY SAME DAY [facility];  Service: Ent   • ANTROSTOMY  5/4/2021    Procedure: MAXILLARY ANTROSTOMY-ENDOSCOPIC;  Surgeon: Precious Mackenzie M.D.;  Location: SURGERY SAME DAY [facility];  Service: Ent   • ETHMOIDECTOMY, ENDOSCOPIC  5/4/2021    Procedure: ETHMOIDECTOMY, ENDOSCOPIC-FRONTAL, LANDMARK.;  Surgeon: Precious Mackenzie M.D.;  Location: SURGERY SAME DAY [facility];  Service: Ent   • OTHER  2018    fall, facial surgeries   • CHOLECYSTECTOMY  11/3/2014   • ERCP N/A 10/30/2014       Family History   Problem Relation Age of Onset   • Stroke Sister    • Anemia Sister         Sickle cell       Social History     Socioeconomic History   • Marital status: Single     Spouse name: Not on file   • Number of children: Not on file    • Years of education: Not on file   • Highest education level: Not on file   Occupational History   • Not on file   Tobacco Use   • Smoking status: Former Smoker     Packs/day: 0.50     Years: 3.00     Pack years: 1.50     Types: Cigarettes   • Smokeless tobacco: Never Used   Vaping Use   • Vaping Use: Never used   Substance and Sexual Activity   • Alcohol use: Not Currently   • Drug use: Not Currently     Types: Inhaled     Comment: [email or web address]   • Sexual activity: Not on file   Other Topics Concern   • Behavioral problems Not Asked   • Interpersonal relationships Not Asked   • Sad or not enjoying activities Not Asked   • Suicidal thoughts Not Asked   • Poor school performance Not Asked   • Reading difficulties Not Asked   • Speech difficulties Not Asked   • Writing difficulties Not Asked   • Inadequate sleep Not Asked   • Excessive TV viewing Not Asked   • Excessive video game use Not Asked   • Inadequate exercise Not Asked   • Sports related Not Asked   • Poor diet Not Asked   • Family concerns for drug/alcohol abuse Not Asked   • Poor oral hygiene Not Asked   • Bike safety Not Asked   • Family concerns vehicle safety Not Asked   Social History Narrative   • Not on file     Social Determinants of Health     Financial Resource Strain:    • Difficulty of Paying Living Expenses: Not on file   Food Insecurity:    • Worried About Running Out of Food in the Last Year: Not on file   • Ran Out of Food in the Last Year: Not on file   Transportation Needs:    • Lack of Transportation (Medical): Not on file   • Lack of Transportation (Non-Medical): Not on file   Physical Activity:    • Days of Exercise per Week: Not on file   • Minutes of Exercise per Session: Not on file   Stress:    • Feeling of Stress : Not on file   Social Connections:    • Frequency of Communication with Friends and Family: Not on file   • Frequency of Social Gatherings with Friends and Family: Not on file   • Attends Jain Services: Not on file  "  • Active Member of Clubs or Organizations: Not on file   • Attends Club or Organization Meetings: Not on file   • Marital Status: Not on file   Intimate Partner Violence:    • Fear of Current or Ex-Partner: Not on file   • Emotionally Abused: Not on file   • Physically Abused: Not on file   • Sexually Abused: Not on file   Housing Stability:    • Unable to Pay for Housing in the Last Year: Not on file   • Number of Places Lived in the Last Year: Not on file   • Unstable Housing in the Last Year: Not on file           ALLERGIES: Patient has no known allergies.    ROS    Constitutional: Denies weight loss , REPORTS daytime fatigue REPORTS initis/nasal congestion, injury..  Cardiovascular: Denies chest pain, tightness, palpitations, swelling in legs/feet, difficulty breathing when lying down but gets better when sitting up.   Respiratory: Denies shortness of breath while awake,  Sleep: per HPI  Gastrointestinal: Denies  difficulty swallowing,  heartburn.  Genitourinary: REPORTS nocturia  Musculoskeletal: Endorses some  painful joints, sore muscles.   Neurological: Endorses   frequent headaches, Denies weakness, dizziness.    PHYSICAL EXAM    Neck circumference:12.5 in    /60 (BP Location: Left arm, Patient Position: Sitting, BP Cuff Size: Adult)   Pulse 75   Resp 16   Ht 1.575 m (5' 2\")   Wt 52.2 kg (115 lb)   SpO2 95%   Appearance: Well-nourished, well-developed,  looks stated age, no acute distress  Eyes:   EOMI  ENMT: MASKED  Neck: Supple, trachea midline  Respiratory effort:  No intercostal retractions or use of accessory muscles  Lung auscultation:  No wheezes rhonchi rubs or rales  Cardiac: No murmurs, rubs, or gallops; regular rhythm, normal rate; no edema  Musculoskeletal:  Grossly normal; gait and station normal  Neurologic:  oriented to person, time, place, and purpose; judgement intact  Psychiatric:  No depression, anxiety, agitation        Medical Decision Making:  The medical record was " reviewed in its as pertains to this referral. This includes records from primary care, consultants notes,  referral request, hospital records, labs, imaging.    Any available diagnostic and titration nocturnal polysomnograms, home sleep apnea tests, continuous nocturnal oximetry results, multiple sleep latency tests, and compliance reports were reviewed with the patient.    Assessment/PLAN::  This patient will need to be studied in lab  to capture her reaction to the air pressure,  to the function and comfort of masks during a split night study.  This is critical should she have obstructive sleep apnea and end up needing Pap therapy    1. Breathing-related sleep disorder  - Polysomnography, 4 or More; Future  - temazepam (RESTORIL) 7.5 MG capsule; Take 1 Capsule by mouth at bedtime as needed for Sleep for up to 2 doses. One day supply  Dispense: 2 Capsule; Refill: 0    2. Nocturnal sleep-related eating disorder  - Polysomnography, 4 or More; Future    3. Snoring  - Polysomnography, 4 or More; Future  - temazepam (RESTORIL) 7.5 MG capsule; Take 1 Capsule by mouth at bedtime as needed for Sleep for up to 2 doses. One day supply  Dispense: 2 Capsule; Refill: 0    4. Chronic fatigue, unspecified  - Polysomnography, 4 or More; Future        The patient has signs and symptoms consistent with obstructive sleep apnea hypopnea syndrome. Will schedule a nocturnal split night polysomnogram.  Pt will use a sleep aid..  Gentle titration due to most recent ENT surgery in June 2021.      The risks of untreated sleep apnea were discussed with the patient at length. Patients with NESHA are at increased risk of cardiovascular disease including coronary artery disease, systemic arterial hypertension, pulmonary arterial hypertension, cardiac arrhythmias, and stroke. NESHA patients have an increased risk of motor vehicle accidents, type 2 diabetes, chronic kidney disease, and non-alcoholic liver disease. The patient was advised to avoid  driving a motor vehicle when drowsy.        Have advised the patient to follow up with the appropriate healthcare practitioners for all other medical problems and issues.              Return for Discuss results of sleep study, with any available provider, two weeks after SS.

## 2022-02-03 ENCOUNTER — APPOINTMENT (OUTPATIENT)
Dept: MEDICAL GROUP | Facility: CLINIC | Age: 22
End: 2022-02-03
Payer: MEDICAID

## 2022-02-06 ENCOUNTER — APPOINTMENT (OUTPATIENT)
Dept: SLEEP MEDICINE | Facility: MEDICAL CENTER | Age: 22
End: 2022-02-06
Payer: MEDICAID

## 2022-02-17 PROBLEM — S09.93XA FACIAL TRAUMA: Status: RESOLVED | Noted: 2018-03-03 | Resolved: 2022-02-17

## 2022-02-25 ENCOUNTER — APPOINTMENT (OUTPATIENT)
Dept: SLEEP MEDICINE | Facility: MEDICAL CENTER | Age: 22
End: 2022-02-25
Payer: MEDICAID

## 2022-03-14 ENCOUNTER — APPOINTMENT (OUTPATIENT)
Dept: SLEEP MEDICINE | Facility: MEDICAL CENTER | Age: 22
End: 2022-03-14
Payer: MEDICAID

## 2022-03-29 ENCOUNTER — OFFICE VISIT (OUTPATIENT)
Dept: MEDICAL GROUP | Facility: CLINIC | Age: 22
End: 2022-03-29
Payer: MEDICAID

## 2022-03-29 VITALS
WEIGHT: 119 LBS | OXYGEN SATURATION: 98 % | HEIGHT: 63 IN | BODY MASS INDEX: 21.09 KG/M2 | HEART RATE: 64 BPM | SYSTOLIC BLOOD PRESSURE: 102 MMHG | RESPIRATION RATE: 16 BRPM | TEMPERATURE: 98.4 F | DIASTOLIC BLOOD PRESSURE: 63 MMHG

## 2022-03-29 DIAGNOSIS — Z98.890 HISTORY OF REDUCTION OF NASAL FRACTURE: ICD-10-CM

## 2022-03-29 DIAGNOSIS — Z87.81 HISTORY OF REDUCTION OF NASAL FRACTURE: ICD-10-CM

## 2022-03-29 DIAGNOSIS — D57.1 SICKLE CELL ANEMIA WITHOUT CRISIS (HCC): Chronic | ICD-10-CM

## 2022-03-29 DIAGNOSIS — R93.89 ABNORMAL FINDING OF DIAGNOSTIC IMAGING: ICD-10-CM

## 2022-03-29 PROCEDURE — 99213 OFFICE O/P EST LOW 20 MIN: CPT | Mod: GE | Performed by: STUDENT IN AN ORGANIZED HEALTH CARE EDUCATION/TRAINING PROGRAM

## 2022-03-29 ASSESSMENT — FIBROSIS 4 INDEX: FIB4 SCORE: 0.37

## 2022-03-29 NOTE — PROGRESS NOTES
[facility] FAMILY MEDICINE OFFICE VISIT    Date: 3/29/2022    MRN: [id number]  Patient ID: Henry Anand    SUBJECTIVE:  Henry Anand is a 21 y.o. woman here to discuss recent MRI results.  Unfortunately, these were ordered by an outside physician who is not sent any records to this office.  Henry states she has a history of nasal bone reconstruction surgery after chronic issues related to her sinus tract and nose.  Patient reportedly had an MRI performed within the past year she continues to have frontal sinus pressure, which allegedly demonstrated a fluid collection, however this was never sent to this physician's office and patient states that this was done by hematology office located in [place].  Patient states that her hematologist was switched from the [facility] to [place] office under the direction of Dr. Thomas Pal.  Patient reports that she has been having ongoing issues related to the cosmetic appearance of her wounds after surgical reconstruction of her nasal bones, and requests referral to dermatology at this time.  States she previously was evaluated by the plastic surgeon here in [place] who stated that they were not willing to evaluate her further, for unknown reasons.    With respect to her sickle cell anemia, Henry reports that much of her necessary screenings have been done through hematology/oncology offices.  Has not had any recent testing though supposed to follow-up with their office in late April.    PMHx/PSHx:  Past Medical History:   Diagnosis Date   • Asthma 2021    asthma   • Gallstones with biliary obstruction 11/2014   • Heart murmur    • Sickle cell anemia without crisis (HCC) 2001     Past Surgical History:   Procedure Laterality Date   • SEPTOTURBINOPLASTY Bilateral 5/4/2021    Procedure: SEPTOPLASTY, NOSE, WITH TURBINOPLASTY;  Surgeon: Precious Mackenzie M.D.;  Location: SURGERY SAME DAY [facility];  Service: Ent   • ANTROSTOMY  5/4/2021     Procedure: MAXILLARY ANTROSTOMY-ENDOSCOPIC;  Surgeon: Preicous Mackenzie M.D.;  Location: SURGERY SAME DAY [facility];  Service: Ent   • ETHMOIDECTOMY, ENDOSCOPIC  5/4/2021    Procedure: ETHMOIDECTOMY, ENDOSCOPIC-FRONTAL, LANDMARK.;  Surgeon: Precious Mackenzie M.D.;  Location: SURGERY SAME DAY [facility];  Service: Ent   • OTHER  2018    fall, facial surgeries   • CHOLECYSTECTOMY  11/3/2014   • ERCP N/A 10/30/2014       Allergies: Patient has no known allergies.    OBJECTIVE:  Vitals:    03/29/22 1123   BP: 102/63   Pulse: 64   Resp: 16   Temp: 36.9 °C (98.4 °F)   SpO2: 98%       Physical Examination:  General: Well appearing woman in no acute distress, resting on arrival to room  HEENT: Normocephalic, atraumatic, EOMI  Cardiovascular: RRR, no murmurs, gallops, or rubs  Pulmonary: CTAB, symmetrical chest expansion, no rales, rhonchi, or wheezes  Abdominal: Non-tender to palpation, no guarding, rigidity, or distension  Extremities: Moves all spontaneously  Neurological: Alert and oriented  Skin: Color appropriate to ethnicity, small post-surgical skin changes of face    ASSESSMENT & PLAN:  Henry Anand is a 21 y.o. woman with history of sickle cell anemia, with alleged recent finding on MRI not available to physician.    1. Sickle cell anemia without crisis (HCC)     2. Abnormal finding of diagnostic imaging     3. History of reduction of nasal fracture  Referral to Dermatology       Orders Placed This Encounter   • Referral to Dermatology       #Sickle cell anemia  Discussed with Henry that there are multiple annual tests necessary for someone of her age with sickle cell anemia, and additionally she is due for annual screening.  Unfortunately, today's visit not scheduled for long enough duration to cover all these issues.  Provided patient with release of information request for her hematology office currently, as well as that that she was established within the [facility] previously.  Advised patient to  schedule follow-up visit in 2 weeks once all information obtained from these offices, for review of annual wellness as well as necessary next steps for her care for sickle cell anemia.  Patient also provided physician with [facility] ride share request form, which physician completed at this office visit.  Patient verbalized understanding.    #Abnormal MRI result  Patient filled out record release request for physician to review MRI results from outside office.  Physician will review independently and will meet with patient again in 2 to 3 weeks for review of all imaging.    #History of reduction of nasal fracture  Patient with cosmetic concerns following her reduction of nasal fracture surgery, which was performed with Dr. Mackenzie of ENT.  Discussed with patient that given her insurance, it is plausible that Medicaid will not cover any cosmetic surgery changes related to her prior surgery, however at this time placed referral to dermatology for further consideration.  Patient states she may be interested in paying out-of-pocket pending consultation with dermatology.    Cullen Castro M.D.  Family Medicine Resident  PGY-3

## 2022-04-12 ENCOUNTER — APPOINTMENT (OUTPATIENT)
Dept: MEDICAL GROUP | Facility: CLINIC | Age: 22
End: 2022-04-12
Payer: MEDICAID

## 2022-04-21 ENCOUNTER — APPOINTMENT (OUTPATIENT)
Dept: SLEEP MEDICINE | Facility: MEDICAL CENTER | Age: 22
End: 2022-04-21
Payer: MEDICAID

## 2022-05-19 ENCOUNTER — APPOINTMENT (OUTPATIENT)
Dept: SLEEP MEDICINE | Facility: MEDICAL CENTER | Age: 22
End: 2022-05-19
Payer: MEDICAID

## 2022-05-19 NOTE — PROGRESS NOTES
Subjective     Henry Anand is a 21 y.o. female who presents with No chief complaint on file.              ROS           Objective     There were no vitals taken for this visit.     Physical Exam                        Assessment & Plan        There are no diagnoses linked to this encounter.

## 2022-06-30 ENCOUNTER — SLEEP STUDY (OUTPATIENT)
Dept: SLEEP MEDICINE | Facility: MEDICAL CENTER | Age: 22
End: 2022-06-30
Attending: PREVENTIVE MEDICINE
Payer: MEDICAID

## 2022-06-30 DIAGNOSIS — R53.82 CHRONIC FATIGUE, UNSPECIFIED: ICD-10-CM

## 2022-06-30 DIAGNOSIS — G47.8 NOCTURNAL SLEEP-RELATED EATING DISORDER: ICD-10-CM

## 2022-06-30 DIAGNOSIS — G47.30 BREATHING-RELATED SLEEP DISORDER: ICD-10-CM

## 2022-06-30 DIAGNOSIS — R06.83 SNORING: ICD-10-CM

## 2022-06-30 PROCEDURE — 95810 POLYSOM 6/> YRS 4/> PARAM: CPT | Performed by: INTERNAL MEDICINE

## 2022-07-01 ENCOUNTER — OFFICE VISIT (OUTPATIENT)
Dept: MEDICAL GROUP | Facility: CLINIC | Age: 22
End: 2022-07-01
Payer: MEDICAID

## 2022-07-01 VITALS
OXYGEN SATURATION: 98 % | BODY MASS INDEX: 21.9 KG/M2 | WEIGHT: 119 LBS | HEIGHT: 62 IN | SYSTOLIC BLOOD PRESSURE: 101 MMHG | RESPIRATION RATE: 17 BRPM | HEART RATE: 65 BPM | DIASTOLIC BLOOD PRESSURE: 68 MMHG

## 2022-07-01 DIAGNOSIS — B07.0 PLANTAR WART: ICD-10-CM

## 2022-07-01 DIAGNOSIS — J34.1 MAXILLARY SINUS CYST: ICD-10-CM

## 2022-07-01 DIAGNOSIS — D57.1 SICKLE CELL DISEASE WITHOUT CRISIS (HCC): ICD-10-CM

## 2022-07-01 DIAGNOSIS — Z23 NEED FOR VACCINATION: ICD-10-CM

## 2022-07-01 PROCEDURE — 90471 IMMUNIZATION ADMIN: CPT | Performed by: FAMILY MEDICINE

## 2022-07-01 PROCEDURE — 90472 IMMUNIZATION ADMIN EACH ADD: CPT | Performed by: FAMILY MEDICINE

## 2022-07-01 PROCEDURE — 90621 MENB-FHBP VACC 2/3 DOSE IM: CPT | Performed by: FAMILY MEDICINE

## 2022-07-01 PROCEDURE — 99214 OFFICE O/P EST MOD 30 MIN: CPT | Mod: 25,GC | Performed by: STUDENT IN AN ORGANIZED HEALTH CARE EDUCATION/TRAINING PROGRAM

## 2022-07-01 PROCEDURE — 90732 PPSV23 VACC 2 YRS+ SUBQ/IM: CPT | Performed by: FAMILY MEDICINE

## 2022-07-01 ASSESSMENT — FIBROSIS 4 INDEX: FIB4 SCORE: 0.37

## 2022-07-01 NOTE — PROCEDURES
MONTAGE: Standard    STUDY TYPE: Diagnostic    RECORDING TECHNIQUE:   After the scalp was prepared, gold plated electrodes were applied to the scalp according to the International 10-20 System. EEG (electroencephalogram) was continuously monitored from the O1-M2, O2-M1, C3-M2, C4-M1, F3-M2, and F4-M1. EOGs (electrooculograms) were monitored by electrodes placed at the left and right outer canthi. Chin EMG (electromyogram) was monitored by electrodes placed on the mentalis and sub-mentalis muscles. Nasal and oral airflow were monitored using a triple port thermocouple as well as oronasal pressure transducer. Respiratory effort was measured by inductive plethysmography technology employing abdominal and thoracic belts. Blood oxygen saturation and pulse were monitored by pulse oximetry. Heart rhythm was monitored by surface electrocardiogram. Leg EMG was studied using surface electrodes placed on left and right anterior tibialis. A microphone was used to monitor tracheal sounds and snoring. Body position was monitored and documented by technician observation.     SCORING CRITERIA:   A modification of the AASM manual for scoring of sleep and associated events was used. Obstructive apneas were scored by cessation of airflow for at least 10 seconds with continuing respiratory effort. Central apneas were scored by cessation of airflow for at least 10 seconds with no respiratory effort. Hypopneas were scored by a 30% or more reduction in airflow for at least 10 seconds accompanied by arterial oxygen desaturation of 3% or an arousal. For CMS (Medicare) patients, per AASM rule 1B, hypopneas are scored by 30% with mild reduction in airflow for at least 10 seconds accompanied by arterial saturation decreased at 4%.    Study start time was 10:58:38 PM. Diagnostic recording time was 6h 44.0m with a total sleep time of 6h 12.0m resulting in a sleep efficiency of 92.08%%. Sleep latency from the start of the study was 18 minutes and  the latency from sleep to REM was 156 minutes. In total,76 arousals were scored for an arousal index of 12.3.  Respiratory:  There were a total of 6 apneas consisting of 0 obstructive apneas, 0 mixed apneas, and 6 central apneas. A total of 34 hypopneas were scored. The apnea index was 0.97 per hour and the hypopnea index was 5.48 per hour resulting in an overall AHI of 6.45. AHI during rem was 8.2 and AHI while supine was 1.15.  Oximetry:  There was a mean oxygen saturation of 92.0%. The minimum oxygen saturation in NREM was 80.0 % and in REM was 85.0. The patient spent 50.6 minutes of TST with SaO2 <88%.  Cardiac:  The highest heart rate seen while awake was 79 BPM while the highest heart rate during sleep was 86 BPM with an average sleeping heart rate of 62 BPM.  Limb Movements:  There were a total of 16 PLMs during sleep which resulted in a PLMS index of 2.6. Of these, 3 were associated with arousals which resulted in a PLMS arousal index of 0.5.        ASSESSMENT:    Mild obstructive sleep apnea hypopnea - AHI 6.5  Persistent nocturnal desaturation - henrietta saturation 80% - saturations less than or equal to 88% for 13.6% of the TST  Failure to meet the split-night protocol secondary to an insufficient number of qualifying respiratory events before 2 AM      RECOMMENDATION:    If significant signs, symptoms, and or comorbidities warrant, recommend a positive airway pressure titration. Alternative treatments for OSAH include behavioral modification, use of a dental appliance, and nasopharyngeal reconstructive surgery. Behavior modification includes weight loss, eliminating alcohol and sedatives, and avoiding the supine position. If alternative treatments are used, a follow-up diagnostic study is warranted to ensure efficacy.  Clinical correlation is needed.  An empiric trial of auto titrating CPAP may be an acceptable option.        Dr. Cullen Urrutia MD

## 2022-07-01 NOTE — LETTER
To Whom It May Concern,    Henry Anand is an established patient at the [facility], [place]. In 2018, Henry suffered a facial fracture as a result of mechanical fall. As a result of her injury, patient missed multiple days of school at that time and during the healing process thereafter. Patient also has a history of sickle cell disease which has caused frequent complications which may have resulted in further missed days of school in order to attend appointments or deal with said complications. Please excuse Henry for these missed absences.     Please do not hesitate to contact our office with any questions at [phone].    Sincerely,    Cullen Castro MD

## 2022-07-02 NOTE — PROGRESS NOTES
[facility] FAMILY MEDICINE OFFICE VISIT    Date: 7/2/2022    MRN: [id number]  Patient ID: Henry Anand    SUBJECTIVE:  Henry Anand is a 21 y.o. female here for multiple concerns. Patient is working to file for disability with [facility]. Henry reports that she has all prior documentation from [facility] and her other providers, however would like physician statement discussing how physician would state that past history of facial trauma and sickle cell anemia has impacted her health condition. Patient also reports that she is trying to return to school and would like a note stating that she missed past classes due to both of these conditions.     Henry notes that she continues to have sinus pressure related to a maxillary cyst seen on MRI as ordered by her Hematology team. Unfortunately, no records have yet arrived at this office despite multiple record release requests. Henry states that she was established with [facility] in the past but would like referral to new office.    Henry also notes a bump on the bottom of her foot which is somewhat painful to walk on. Has been trying to walk on the side of her foot to avoid placing weight on this. She has been exfoliating the bump daily to attempt to remove it, without success.     PMHx/PSHx:  Past Medical History:   Diagnosis Date   • Asthma 2021    asthma   • Gallstones with biliary obstruction 11/2014   • Heart murmur    • Sickle cell anemia without crisis (HCC) 2001     Past Surgical History:   Procedure Laterality Date   • SEPTOTURBINOPLASTY Bilateral 5/4/2021    Procedure: SEPTOPLASTY, NOSE, WITH TURBINOPLASTY;  Surgeon: Precious Mackenzie M.D.;  Location: SURGERY SAME DAY [facility];  Service: Ent   • ANTROSTOMY  5/4/2021    Procedure: MAXILLARY ANTROSTOMY-ENDOSCOPIC;  Surgeon: Precious Mackenzie M.D.;  Location: SURGERY SAME DAY [facility];  Service: Ent   • ETHMOIDECTOMY, ENDOSCOPIC  5/4/2021    Procedure: ETHMOIDECTOMY,  "ENDOSCOPIC-FRONTAL, LANDMARK.;  Surgeon: Precious Mackenzie M.D.;  Location: SURGERY SAME DAY [facility];  Service: Ent   • OTHER  2018    fall, facial surgeries   • CHOLECYSTECTOMY  11/3/2014   • ERCP N/A 10/30/2014       Allergies: Haloperidol    OBJECTIVE:  Vitals:    07/01/22 1047   BP: 101/68   Pulse: 65   Resp: 17   SpO2: 98%     Vitals:    07/01/22 1047   BP: 101/68   Weight: 54 kg (119 lb)   Height: 1.575 m (5' 2\")       Physical Examination:  General: Well appearing female in no acute distress, resting on arrival to room  HEENT: Normocephalic, atraumatic, EOMI  Cardiovascular: No acrocyanosis  Pulmonary: No tachypnea or retractions  Extremities: Moves all spontaneously, plantar wart on right foot lateral plantar surface  Neurological: Alert and oriented    ASSESSMENT & PLAN:  Henry Mónica Anand is a 21 y.o. female here for multiple medical concerns as discussed below.     1. Sickle cell disease without crisis (HCC)     2. Maxillary sinus cyst  Referral to ENT   3. Need for vaccination  Meningococcal Vaccine (IM) Group B    Pneumoccocal Polysaccharide Vaccine 23-Valent =>1yo SQ/IM   4. Plantar wart         Orders Placed This Encounter   • Meningococcal Vaccine (IM) Group B   • Pneumoccocal Polysaccharide Vaccine 23-Valent =>1yo SQ/IM   • Referral to ENT       #Sickle cell disease  Physician wrote note for school district as requested stating multiple medical concerns that would have led to prior school absences. Discussed with Henry that typically physician would perform complete physical examination which documents extent of limitations affecting patient related to her medical condition. Advised Henry that many  have such physical exam documents that they can provide, and to contact a law office if so interested to obtain a physical examination component form. Advised Marly to schedule follow-up in 1 month prior to her court date as long visit for physician and patient to complete " full physical examination at that time. Patient verbalized understanding.     #Maxillary sinus cyst  Patient with maxillary sinus pain related to maxillary sinus cyst. Recent MRI not available, though CT sinus from 2020 with 1.1 x 2.0 mucous retention cyst in the left maxillary sinus. Presumably, this is same cyst as visualized on more recent  MRI. At this time will place referral to ENT for treatment options. Referrals process discussed.     #Need for vaccination  Patient due for meningococcal and PPSV23 vaccines, administered during today's visit.     #Plantar wart  Patient with plantar wart on the lateral aspect of plantar surface of right foot.  Discussed the use of over-the-counter salicylic acid for reduction in size, as well as continued exfoliation to unroof this lesion.  Discussed with Henry that dermatology whom she is seeing in a few weeks may be able to remove this lesion if desired, or alternatively physician could place referral to podiatry if dermatology will not remove it.  Henry verbalized agreement and understanding of plan of care.    Cullen Castro M.D.  Family Medicine Resident  PGY-3

## 2022-07-14 ENCOUNTER — PATIENT OUTREACH (OUTPATIENT)
Dept: HEALTH INFORMATION MANAGEMENT | Facility: OTHER | Age: 22
End: 2022-07-14
Payer: MEDICAID

## 2022-07-14 DIAGNOSIS — J32.4 CHRONIC PANSINUSITIS: ICD-10-CM

## 2022-07-14 DIAGNOSIS — D57.1 SICKLE CELL ANEMIA WITHOUT CRISIS (HCC): ICD-10-CM

## 2022-07-14 DIAGNOSIS — J45.990 ASTHMA, EXERCISE INDUCED: ICD-10-CM

## 2022-07-14 NOTE — PROGRESS NOTES
Medical Social Work    Referral: Self referral from working with previous outpatient pediatric social worker, Izabel Upton. Patient seeking assistance with travel to several appointments in [place] for ongoing care.     Intervention: SW received voicemail on 7/13/22 at 2 PM from patient requesting assistance for travel. SW returned phone call on today's date to notify patient of transferring referral to Adult Care Management team, as patient is now 20yo and does not currently see any pediatric providers for her care. Patient does see a family medicine provider, Dr. Castro, which should qualify her for care coordination.    Plan: No follow up from this SW.

## 2022-07-15 ENCOUNTER — PATIENT OUTREACH (OUTPATIENT)
Dept: HEALTH INFORMATION MANAGEMENT | Facility: OTHER | Age: 22
End: 2022-07-15
Payer: MEDICAID

## 2022-07-15 NOTE — PROGRESS NOTES
CHW L[place] received referral from patient's PCP to help with transportation. CHW spoke with patient. Patient reports she needs to see a Hematologist. [place] and spoke with them about travel and they stated they cannot provide travel to [place]. Patient is okay with CHW calling [facility].     CHW called [facility]. Per [facility], patient is able to utilize transportation services, but she needs to call at least 14 days in advance.    CHW called patient and relayed info. Patient will call and schedule hematology appt 3 weeks out and will then call [facility] to arrange transportation. Patient has contact info for CHW and will call if needed.

## 2022-08-04 ENCOUNTER — OFFICE VISIT (OUTPATIENT)
Dept: MEDICAL GROUP | Facility: CLINIC | Age: 22
End: 2022-08-04
Payer: MEDICAID

## 2022-08-04 VITALS
WEIGHT: 110 LBS | TEMPERATURE: 97.6 F | BODY MASS INDEX: 20.24 KG/M2 | SYSTOLIC BLOOD PRESSURE: 88 MMHG | HEIGHT: 62 IN | HEART RATE: 84 BPM | OXYGEN SATURATION: 96 % | DIASTOLIC BLOOD PRESSURE: 60 MMHG

## 2022-08-04 DIAGNOSIS — J45.30 MILD PERSISTENT ASTHMA WITHOUT COMPLICATION: ICD-10-CM

## 2022-08-04 DIAGNOSIS — M54.6 CHRONIC MIDLINE THORACIC BACK PAIN: ICD-10-CM

## 2022-08-04 DIAGNOSIS — G89.29 CHRONIC MIDLINE THORACIC BACK PAIN: ICD-10-CM

## 2022-08-04 DIAGNOSIS — D57.1 SICKLE CELL ANEMIA WITHOUT CRISIS (HCC): Chronic | ICD-10-CM

## 2022-08-04 PROBLEM — F41.8 ANXIETY ASSOCIATED WITH DEPRESSION: Status: ACTIVE | Noted: 2020-08-27

## 2022-08-04 PROBLEM — F43.10 PTSD (POST-TRAUMATIC STRESS DISORDER): Status: ACTIVE | Noted: 2022-08-04

## 2022-08-04 PROBLEM — F12.10 CANNABIS ABUSE: Status: ACTIVE | Noted: 2020-08-27

## 2022-08-04 PROBLEM — M54.9 BACKACHE: Status: ACTIVE | Noted: 2020-11-23

## 2022-08-04 PROCEDURE — 99214 OFFICE O/P EST MOD 30 MIN: CPT | Mod: GC | Performed by: STUDENT IN AN ORGANIZED HEALTH CARE EDUCATION/TRAINING PROGRAM

## 2022-08-04 RX ORDER — ALBUTEROL SULFATE 90 UG/1
2 AEROSOL, METERED RESPIRATORY (INHALATION) EVERY 6 HOURS PRN
Qty: 6.7 EACH | Refills: 4 | Status: SHIPPED | OUTPATIENT
Start: 2022-08-04 | End: 2022-08-22

## 2022-08-04 RX ORDER — DIPHENHYDRAMINE HCL 25 MG
25 TABLET ORAL NIGHTLY PRN
Qty: 30 TABLET | Refills: 1 | Status: SHIPPED | OUTPATIENT
Start: 2022-08-04 | End: 2022-12-14

## 2022-08-04 RX ORDER — FLUTICASONE PROPIONATE 44 UG/1
2 AEROSOL, METERED RESPIRATORY (INHALATION) DAILY
Qty: 10.6 G | Refills: 5 | Status: SHIPPED | OUTPATIENT
Start: 2022-08-04 | End: 2022-12-14

## 2022-08-04 RX ORDER — DIPHENHYDRAMINE HCL 25 MG
25 TABLET ORAL EVERY 6 HOURS PRN
COMMUNITY
End: 2022-08-04 | Stop reason: SDUPTHER

## 2022-08-04 RX ORDER — FLUTICASONE PROPIONATE 44 UG/1
2 AEROSOL, METERED RESPIRATORY (INHALATION) DAILY
COMMUNITY
End: 2022-08-04 | Stop reason: SDUPTHER

## 2022-08-04 RX ORDER — LORATADINE 10 MG/1
10 TABLET ORAL DAILY
Qty: 90 TABLET | Refills: 3 | Status: SHIPPED | OUTPATIENT
Start: 2022-08-04 | End: 2022-12-14

## 2022-08-04 RX ORDER — LORATADINE 10 MG/1
10 TABLET ORAL
COMMUNITY
End: 2022-08-04 | Stop reason: SDUPTHER

## 2022-08-04 RX ORDER — HYDROCODONE BITARTRATE AND ACETAMINOPHEN 5; 325 MG/1; MG/1
1 TABLET ORAL EVERY 6 HOURS PRN
COMMUNITY
Start: 2022-05-11 | End: 2023-11-30

## 2022-08-04 ASSESSMENT — PATIENT HEALTH QUESTIONNAIRE - PHQ9
SUM OF ALL RESPONSES TO PHQ QUESTIONS 1-9: 17
CLINICAL INTERPRETATION OF PHQ2 SCORE: 6
5. POOR APPETITE OR OVEREATING: 3 - NEARLY EVERY DAY

## 2022-08-04 ASSESSMENT — FIBROSIS 4 INDEX: FIB4 SCORE: 0.37

## 2022-08-04 NOTE — LETTER
RE: Henry Anand   2000    To Whom it May Concern,    Henry Anand is an established patient of [facility] at [facility] in [place], [place]. Henry has been a patient of this practice since . I first began caring for Henry in 2020.     Henry has multiple medical conditions which she has permitted myself to share with you, including sickle cell anemia without crisis, chronic back pain, history of nasoorbitoethmoid fracture for which she has chronic pansinusitis, PTSD, anxiety, depression, persistent asthma, and history of abdominal surgery. She is established with Hematology for management of many of these conditions, including her chronic pain, and was recently referred to Otolaryngology (ENT, Ear Nose Throat) for chronic pansinusitis. My patient strongly feels that her numerous conditions prevent her from performing or contributing meaningfully in a work setting, and these conditions have impacted her ability to obtain education in the past. I feel that her conditions adversely affect her ability to perform work in a timely matter and often result in missed days due to need for medical attention.     Please do not hesitate to contact my office or myself directly with any questions. My patient appreciates your consideration in this matter.     Sincerely,    Cullen Castro MD

## 2022-08-04 NOTE — LETTER
RE: Henry Montalvo   2000    To Whom it May Concern,    Henry Anand is an established patient of [facility] at [facility] in [place], [place]. Henry has been a patient of this practice since . I first began caring for Henry in 2020.     Henry has multiple medical conditions which she has permitted myself to share with you, including sickle cell anemia without crisis, chronic back pain, history of nasoorbitoethmoid fracture for which she has chronic pansinusitis, PTSD, anxiety, depression, persistent asthma, and history of abdominal surgery. She is established with Hematology for management of many of these conditions, including her chronic pain, and was recently referred to Otolaryngology (ENT, Ear Nose Throat) for chronic pansinusitis. My patient strongly feels that her numerous conditions prevent her from performing or contributing meaningfully in a work setting, and these conditions have impacted her ability to obtain education in the past. I feel that her conditions adversely affect her ability to perform work in a timely matter and often result in missed days due to need for medical attention.     Please do not hesitate to contact my office or myself directly with any questions. My patient appreciates your consideration in this matter.     Sincerely,    Cullen Castro MD

## 2022-08-05 NOTE — PROGRESS NOTES
[facility] FAMILY MEDICINE OFFICE VISIT    Date: 8/4/2022    MRN: [id number]  Patient ID: Henry Anand    SUBJECTIVE:  Henry Anand is a 21 y.o. woman here for follow-up and medication refills.  Patient notes that she is presently out of her inhalers and oral medications for treatment of her persistent asthma.    Patient notes that she was referred to ear nose throat, though has not heard back on the status of this referral.  Provided patient with information regarding contact info for ear nose throat physician located in [place] as previously referred.    Henry states that she is going before a court  next week to discuss filing for disability.  Request note from physician at this time documenting her known medical diagnoses as well as how these impact her health.  Patient does not have a form with her today as previously requested by this physician which would enable myself to perform a physical examination assessment.  Patient does feel that her medical diagnoses have adversely impacted her health and ability to perform meaningful tasks in a workplace environment.  Patient also requests a referral to a nutritionist at this time due to concern for her ability to maintain adequate nutrition in the setting of sickle cell disease.    PMHx/PSHx:  Past Medical History:   Diagnosis Date   • Asthma 2021    asthma   • Gallstones with biliary obstruction 11/2014   • Heart murmur    • Sickle cell anemia without crisis (HCC) 2001     Past Surgical History:   Procedure Laterality Date   • SEPTOTURBINOPLASTY Bilateral 5/4/2021    Procedure: SEPTOPLASTY, NOSE, WITH TURBINOPLASTY;  Surgeon: Precious Mackenzie M.D.;  Location: SURGERY SAME DAY [facility];  Service: Ent   • ANTROSTOMY  5/4/2021    Procedure: MAXILLARY ANTROSTOMY-ENDOSCOPIC;  Surgeon: Precious Mackenzie M.D.;  Location: SURGERY SAME DAY [facility];  Service: Ent   • ETHMOIDECTOMY, ENDOSCOPIC  5/4/2021    Procedure: ETHMOIDECTOMY,  "ENDOSCOPIC-FRONTAL, LANDMARK.;  Surgeon: Precious Mackenzie M.D.;  Location: SURGERY SAME DAY [facility];  Service: Ent   • OTHER  2018    fall, facial surgeries   • CHOLECYSTECTOMY  11/3/2014   • ERCP N/A 10/30/2014       Allergies: Haloperidol    OBJECTIVE:  Vitals:    08/04/22 1621   BP: (!) 88/60   Pulse: 84   Temp: 36.4 °C (97.6 °F)   SpO2: 96%     Vitals:    08/04/22 1621   BP: (!) 88/60   Weight: 49.9 kg (110 lb)   Height: 1.575 m (5' 2\")       Physical Examination:  General: Well appearing woman in no acute distress, resting on arrival to room  HEENT: Normocephalic, atraumatic, EOMI  Cardiovascular: RRR, no murmurs, gallops, or rubs  Pulmonary: CTAB, symmetrical chest expansion, no rales, rhonchi, or wheezes  Abdominal: Non-tender to palpation, no guarding, rigidity, or distension  Extremities: Moves all spontaneously  Neurological: Alert and oriented    ASSESSMENT & PLAN:  Henry Anand is a 21 y.o. woman requiring medication refills for mild persistent asthma, as well as ongoing management of sickle cell disease.    1. Mild persistent asthma without complication  diphenhydrAMINE (BENADRYL) 25 MG Tab    loratadine (CLARITIN) 10 MG Tab    fluticasone (FLOVENT HFA) 44 MCG/ACT Aerosol    albuterol 108 (90 Base) MCG/ACT Aero Soln inhalation aerosol   2. Chronic midline thoracic back pain  diphenhydrAMINE (BENADRYL) 25 MG Tab   3. Sickle cell anemia without crisis (HCC)  Nutrition (Dietary) Consult       Orders Placed This Encounter   • Nutrition (Dietary) Consult   • Cholecalciferol (VITAMIN D3) 125 MCG (5000 UT) Cap   • DISCONTD: loratadine (CLARITIN) 10 MG Tab   • HYDROcodone-acetaminophen (NORCO) 5-325 MG Tab per tablet   • DISCONTD: fluticasone (FLOVENT HFA) 44 MCG/ACT Aerosol   • DISCONTD: diphenhydrAMINE (BENADRYL) 25 MG Tab   • diphenhydrAMINE (BENADRYL) 25 MG Tab   • loratadine (CLARITIN) 10 MG Tab   • fluticasone (FLOVENT HFA) 44 MCG/ACT Aerosol   • albuterol 108 (90 Base) MCG/ACT Aero Soln " inhalation aerosol       #Mild persistent asthma  Refilled patient's Benadryl, loratadine, fluticasone, and albuterol medications at this time and sent to pharmacy of choice.    #Chronic midline thoracic back pain  #Sickle cell anemia  Provided patient with written letter from this physician stating patient's known medical diagnoses, as well as that these appear to negatively impact her perceived ability to perform meaningful tasks related to the workplace environment.  At this time have also placed referral to nutrition as requested.  Advised Thuanlle to contact physician if she does not hear back on the status of her referral within 2 weeks.  Patient verbalized understanding.    Cullen Castro M.D.  Family Medicine Resident  PGY-4

## 2022-08-22 DIAGNOSIS — J45.30 MILD PERSISTENT ASTHMA WITHOUT COMPLICATION: ICD-10-CM

## 2022-08-22 RX ORDER — ALBUTEROL SULFATE 90 UG/1
2 AEROSOL, METERED RESPIRATORY (INHALATION) EVERY 6 HOURS PRN
Qty: 1 EACH | Refills: 11 | Status: SHIPPED | OUTPATIENT
Start: 2022-08-22

## 2022-09-22 ENCOUNTER — OFFICE VISIT (OUTPATIENT)
Dept: SLEEP MEDICINE | Facility: MEDICAL CENTER | Age: 22
End: 2022-09-22
Payer: MEDICAID

## 2022-09-22 VITALS
WEIGHT: 112.7 LBS | OXYGEN SATURATION: 96 % | RESPIRATION RATE: 16 BRPM | BODY MASS INDEX: 20.74 KG/M2 | HEIGHT: 62 IN | SYSTOLIC BLOOD PRESSURE: 114 MMHG | DIASTOLIC BLOOD PRESSURE: 70 MMHG | HEART RATE: 63 BPM

## 2022-09-22 DIAGNOSIS — G47.33 OSA (OBSTRUCTIVE SLEEP APNEA): ICD-10-CM

## 2022-09-22 DIAGNOSIS — T07.XXXA FRACTURES: ICD-10-CM

## 2022-09-22 DIAGNOSIS — Z23 NEED FOR INFLUENZA VACCINATION: Primary | ICD-10-CM

## 2022-09-22 DIAGNOSIS — G47.34 NOCTURNAL OXYGEN DESATURATION: ICD-10-CM

## 2022-09-22 PROCEDURE — 99214 OFFICE O/P EST MOD 30 MIN: CPT | Mod: 25 | Performed by: PREVENTIVE MEDICINE

## 2022-09-22 PROCEDURE — 90471 IMMUNIZATION ADMIN: CPT | Performed by: PREVENTIVE MEDICINE

## 2022-09-22 PROCEDURE — 90686 IIV4 VACC NO PRSV 0.5 ML IM: CPT | Performed by: PREVENTIVE MEDICINE

## 2022-09-22 ASSESSMENT — FIBROSIS 4 INDEX: FIB4 SCORE: 0.39

## 2022-09-22 NOTE — PROGRESS NOTES
"CHIEF COMPLIANT: \"How did I do on my sleep study?\"   LAST SEEN:  Dr. Duval on [id number]  HISTORY OF PRESENT ILLNESS:  Henry Anand is a 22 y.o.female .  She is here to discuss sleep study results.  She has a past medical history of patient has medical history history of trauma, multiple facial, exercise-induced asthma, anxiety,and sickle cell anemia.     Female with a pmhx of sickle cell and asthma  who was admitted on 3/2/2018 for facial fractures secondary to a slip and fall. She was at [employer] when she asked a  for a ride when she missed her bus. She then arrived at her apartment and slipped and fell on the edge of the first stair..She was hospitalized and it was determined to have a severe deformity of the DIMAS.Depressed posteriorly severely.  Nasal bones displaced posteriorly.  Severe defect. Telecanthus and severe widening of the entire nasoorbitoethmoid complex..  She was transferred to [facility], [place] for surgical repair of her facial fractures..     Her facial fractures are healing recent imaging:  CT Sinuses without Contrast (03/05/2020 3:59 PM PST)  CT Sinuses without Contrast (03/05/2020 3:59 PM PST)   Specimen              CT Sinuses without Contrast (03/05/2020 3:59 PM PST)   Impressions Performed At   Bilateral frontal, ethmoid, and left maxillary sinus disease as described above.  No evidence of acute sinusitis is identified.       CHO RAD            CT Sinuses without Contrast (03/05/2020 3:59 PM PST)   Narrative Performed At   EXAMINATION DESCRIPTION:    CT SINUSES WITHOUT CONTRAST        INDICATION(S):    Chronic sinusitis versus sinus drainage obstruction        TECHNIQUE:    Reformatted 1 millimeter axial, sagittal, and coronal images of the paranasal sinuses are obtained from the mid forehead through the maxilla.  No intravenous contrast is administered.           COMPARISON:    No prior study is available.        FINDINGS:    The ostiomeatal " complexes are patent bilaterally.  The right maxillary sinus is well aerated.  The left maxillary sinus contains a 1.1 x 2.0 cm mucous retention cyst.  The sphenoid and left posterior ethmoid sinuses largely are well aerated.  The remaining ethmoid and frontal sinuses are partially opacified.  No air-fluid level is identified.  Only minimal (1 to 2 millimeter) wavy nasal septal deviation is identified.  Radiopaque metallic hardware projecting within the frontal sinus anterior table, nasal bone surface, and left anterior maxillary sinus are compatible with prior surgical reconstruction.        The mastoid sinuses, middle ears, and external auditory canals are clear.       CHO RAD        CT Sinuses without Contrast (03/05/2020 3:59 PM PST)   Procedure Note   John, Rad Results In - 03/05/2020 4:06 PM PST    EXAMINATION DESCRIPTION:  CT SINUSES WITHOUT CONTRAST    INDICATION(S):  Chronic sinusitis versus sinus drainage obstruction    TECHNIQUE:  Reformatted 1 millimeter axial, sagittal, and coronal images of the paranasal sinuses are obtained from the mid forehead through the maxilla. No intravenous contrast is administered.        COMPARISON:  No prior study is available.    FINDINGS:  The ostiomeatal complexes are patent bilaterally. The right maxillary sinus is well aerated. The left maxillary sinus contains a 1.1 x 2.0 cm mucous retention cyst. The sphenoid and left posterior ethmoid sinuses largely are well aerated. The remaining ethmoid and frontal sinuses are partially opacified. No air-fluid level is identified. Only minimal (1 to 2 millimeter) wavy nasal septal deviation is identified. Radiopaque metallic hardware projecting within the frontal sinus anterior table, nasal bone surface, and left anterior maxillary sinus are compatible with prior surgical reconstruction.    The mastoid sinuses, middle ears, and external auditory canals are clear.    IMPRESSION:   Bilateral frontal, ethmoid, and left maxillary sinus  disease as described above. No evidence of acute sinusitis is identified.          She was initially seen in February in our Sleep Medicine Clinic because of poor sleep quality and possible breathing related sleep disorders.  She gives a history of sleep walking and sleep singing when she was younger.  Also she reports loud snoring, insomnia, daytime sleepiness,nightmares,poor quality sleep.  [place] score 8/24..She is unemployed.  She is a never tobacco user but she does smoke 2 joints per day and she does drink caffenated beverages. She also reports irritabilty,as well as feeling anxious and grinding her teeth at night.  Patient has not had an additional surgeries since her last visit.     Sleep study results:  TYPE: in lab diagnostic   DATE: 6/30/22  Diagnostic:AHI: 6.5  Diagnostic  Oxygen Ra: 80% with 13.6 % of TST at or under 88%. That is equivalent of 50.6 minutes at or under 88%       Significant comorbidities and modifying factors: see HPI  PROBLEM LIST:  Patient Active Problem List    Diagnosis Date Noted    Mild persistent asthma without complication 08/04/2022    PTSD (post-traumatic stress disorder) 08/04/2022    Chronic pansinusitis 05/04/2021    Backache 11/23/2020    Anxiety associated with depression 08/27/2020    Cannabis abuse 08/27/2020    Nasoorbitoethmoid fracture (HCC) 03/03/2018    S/P laparoscopic cholecystectomy 10/17/2014    Sickle cell anemia without crisis (HCC) 2001     PAST MEDICAL HISTORY:  Past Medical History:   Diagnosis Date    Asthma 2021    asthma    Gallstones with biliary obstruction 11/2014    Heart murmur     Sickle cell anemia without crisis (HCC) 2001      PAST SOCIAL HISTORY:  Past Surgical History:   Procedure Laterality Date    SEPTOTURBINOPLASTY Bilateral 5/4/2021    Procedure: SEPTOPLASTY, NOSE, WITH TURBINOPLASTY;  Surgeon: Precious Mackenzie M.D.;  Location: SURGERY SAME DAY [facility];  Service: Ent    ANTROSTOMY  5/4/2021    Procedure: MAXILLARY  ANTROSTOMY-ENDOSCOPIC;  Surgeon: Precious Mackenzie M.D.;  Location: SURGERY SAME DAY [facility];  Service: Ent    ETHMOIDECTOMY, ENDOSCOPIC  5/4/2021    Procedure: ETHMOIDECTOMY, ENDOSCOPIC-FRONTAL, LANDMARK.;  Surgeon: Precious Mackenzie M.D.;  Location: SURGERY SAME DAY [facility];  Service: Ent    OTHER  2018    fall, facial surgeries    CHOLECYSTECTOMY  11/3/2014    ERCP N/A 10/30/2014     PAST FAMILY HISTORY:  Family History   Problem Relation Age of Onset    Stroke Sister     Anemia Sister         Sickle cell    Sleep Apnea Mother     Sleep Apnea Father     Sleep Apnea Brother      SOCIAL HISTORY:  Social History     Socioeconomic History    Marital status: Single     Spouse name: Not on file    Number of children: Not on file    Years of education: Not on file    Highest education level: Not on file   Occupational History    Not on file   Tobacco Use    Smoking status: Never    Smokeless tobacco: Never   Vaping Use    Vaping Use: Never used   Substance and Sexual Activity    Alcohol use: Not Currently    Drug use: Yes     Types: Inhaled     Comment: job , 2 joints    Sexual activity: Not on file   Other Topics Concern    Behavioral problems Not Asked    Interpersonal relationships Not Asked    Sad or not enjoying activities Not Asked    Suicidal thoughts Not Asked    Poor school performance Not Asked    Reading difficulties Not Asked    Speech difficulties Not Asked    Writing difficulties Not Asked    Inadequate sleep Not Asked    Excessive TV viewing Not Asked    Excessive video game use Not Asked    Inadequate exercise Not Asked    Sports related Not Asked    Poor diet Not Asked    Family concerns for drug/alcohol abuse Not Asked    Poor oral hygiene Not Asked    Bike safety Not Asked    Family concerns vehicle safety Not Asked   Social History Narrative    Not on file     Social Determinants of Health     Financial Resource Strain: Not on file   Food Insecurity: Not on file   Transportation Needs: Not on  "file   Physical Activity: Not on file   Stress: Not on file   Social Connections: Not on file   Intimate Partner Violence: Not on file   Housing Stability: Not on file     ALLERGIES: Haloperidol  MEDICATIONS:  Current Outpatient Medications   Medication Sig Dispense Refill    albuterol 108 (90 Base) MCG/ACT Aero Soln inhalation aerosol Inhale 2 Puffs every 6 hours as needed for Shortness of Breath. 1 Each 11    Cholecalciferol (VITAMIN D3) 125 MCG (5000 UT) Cap Take 1 Capsule by mouth every day.      HYDROcodone-acetaminophen (NORCO) 5-325 MG Tab per tablet Take 1 Tablet by mouth every 6 hours as needed.      diphenhydrAMINE (BENADRYL) 25 MG Tab Take 1 Tablet by mouth at bedtime as needed (allergies). 30 Tablet 1    loratadine (CLARITIN) 10 MG Tab Take 1 Tablet by mouth every day. 90 Tablet 3    fluticasone (FLOVENT HFA) 44 MCG/ACT Aerosol Inhale 2 Puffs every day. 10.6 g 5    hydroxyurea (HYDREA) 500 MG Cap Take 1,500 mg by mouth every bedtime.      folic acid (FOLVITE) 1 MG Tab Take 1 Tab by mouth every day. 30 Tab 5    penicillin v potassium (VEETID) 250 MG Tab Take 1 Tab by mouth 2 times a day. (Patient taking differently: Take 250 mg by mouth 2 times a day. Maintenance  dose) 60 Tab 5     No current facility-administered medications for this visit.    \"CURRENT RX\"    REVIEW OF SYSTEMS:  Constitutional: Denies weight loss, endorses chronic daytime fatigue  Eyes: Denies vision changes  Ears/Nose/Mouth/Throat: Denies rhinitis/nasal congestion, injury, decayed teeth/toothaches.  Cardiovascular: Denies chest pain, tightness, palpitations, swelling in legs/feet, difficulty breathing when lying down but gets better when sitting up.   Respiratory: Denies shortness of breath while awake,  Sleep: per HPI  Gastrointestinal: Denies  difficulty swallowing,  heartburn.  Genitourinary: REPORTS nocturia  Musculoskeletal: Denies painful joints, sore muscles, back pain.   Neurological: Denies frequent headaches,weakness, " "dizziness.    PHYSICAL EXAM/VITALS:  /70 (BP Location: Left arm, Patient Position: Sitting, BP Cuff Size: Adult)   Pulse 63   Resp 16   Ht 1.575 m (5' 2\")   Wt 51.1 kg (112 lb 11.2 oz)   SpO2 96%   BMI 20.61 kg/m²   Appearance: Well-nourished, well-developed,  looks stated age, no acute distress  Eyes:   EOMI  ENMT: MASKED  Neck: Supple, trachea midline  Respiratory effort:  No intercostal retractions or use of accessory muscles  Musculoskeletal:  Grossly normal; gait and station normal  Neurologic:  oriented to person, time, place, and purpose; judgement intact  Psychiatric:  No depression, anxiety, agitation    MEDICAL DECISION MAKING:  The medical record was reviewed in its as pertains to this referral. This includes records from primary care, consultants notes,  referral request, hospital records, labs, imaging. Any available diagnostic and titration nocturnal polysomnograms, home sleep apnea tests, continuous nocturnal oximetry results, multiple sleep latency tests, and compliance reports were reviewed with the patient.    ASSESSMENT/PLAN:  This patient has mild sleep apnea and is a candidate for an oral appliance therapy. She was given the information regarding dentists in the area who can create these devices. She was also informed  that she is a candidate for PAP therapy and this was described in detail . The CPAP does include humidified air that prevents mucosal drying during sleep and the patient felt that would be helpful. The patient will discuss options with  her  as soon as her case is opened.    Diagnoses:   1. Need for influenza vaccination  - INFLUENZA VACCINE QUAD INJ (PF)    2. Nocturnal oxygen desaturation    3. NESHA (obstructive sleep apnea)    4. Fractures    The risks of untreated sleep apnea were discussed with the patient at length. Patients with NESHA are at increased risk of cardiovascular disease including coronary artery disease, systemic arterial hypertension, pulmonary " arterial hypertension, cardiac arrhythmias, and stroke. NESHA patients have an increased risk of motor vehicle accidents, type 2 diabetes, chronic kidney disease, and non-alcoholic liver disease. The patient was advised to avoid driving a motor vehicle when drowsy.  Have advised the patient to follow up with the appropriate healthcare practitioners for all other medical problems and issues.    RETURN TO CLINIC: Return in about 2 months (around 11/22/2022) for to discuss treatment choice.  My total time spent caring for the patient on the day of the encounter was 40minutes. This includes time time spent on a thorough chart review including other physician notes, any type of sleep study, as well as critical labs and pulmonary and cardiac studies.  Additionally it includes discussions of good sleep hygiene, stimulus control and going over the need for consistency in terms of sleep preparation and practice.     Please note that this dictation was created using voice recognition software.  I have made every reasonable attempt to correct obvious errors, I expect that there are errors of grammar and possibly content that I did not discover before finalizing this note.

## 2022-11-22 ENCOUNTER — APPOINTMENT (OUTPATIENT)
Dept: SLEEP MEDICINE | Facility: MEDICAL CENTER | Age: 22
End: 2022-11-22
Payer: MEDICAID

## 2022-12-14 ENCOUNTER — APPOINTMENT (OUTPATIENT)
Dept: RADIOLOGY | Facility: MEDICAL CENTER | Age: 22
DRG: 812 | End: 2022-12-14
Attending: STUDENT IN AN ORGANIZED HEALTH CARE EDUCATION/TRAINING PROGRAM
Payer: MEDICAID

## 2022-12-14 ENCOUNTER — HOSPITAL ENCOUNTER (INPATIENT)
Facility: MEDICAL CENTER | Age: 22
LOS: 2 days | DRG: 812 | End: 2022-12-16
Attending: STUDENT IN AN ORGANIZED HEALTH CARE EDUCATION/TRAINING PROGRAM | Admitting: HOSPITALIST
Payer: MEDICAID

## 2022-12-14 DIAGNOSIS — D57.00 SICKLE CELL PAIN CRISIS (HCC): ICD-10-CM

## 2022-12-14 DIAGNOSIS — N30.90 CYSTITIS: ICD-10-CM

## 2022-12-14 DIAGNOSIS — D64.9 ANEMIA, UNSPECIFIED TYPE: ICD-10-CM

## 2022-12-14 DIAGNOSIS — D72.829 LEUKOCYTOSIS, UNSPECIFIED TYPE: ICD-10-CM

## 2022-12-14 LAB
ALBUMIN SERPL BCP-MCNC: 4 G/DL (ref 3.2–4.9)
ALBUMIN SERPL BCP-MCNC: 4.2 G/DL (ref 3.2–4.9)
ALBUMIN/GLOB SERPL: 1.3 G/DL
ALBUMIN/GLOB SERPL: 1.3 G/DL
ALP SERPL-CCNC: 75 U/L (ref 30–99)
ALP SERPL-CCNC: 84 U/L (ref 30–99)
ALT SERPL-CCNC: 23 U/L (ref 2–50)
ALT SERPL-CCNC: 27 U/L (ref 2–50)
ANION GAP SERPL CALC-SCNC: 8 MMOL/L (ref 7–16)
ANION GAP SERPL CALC-SCNC: 9 MMOL/L (ref 7–16)
APPEARANCE UR: ABNORMAL
AST SERPL-CCNC: 33 U/L (ref 12–45)
AST SERPL-CCNC: 34 U/L (ref 12–45)
BACTERIA #/AREA URNS HPF: ABNORMAL /HPF
BASOPHILS # BLD AUTO: 0.4 % (ref 0–1.8)
BASOPHILS # BLD: 0.08 K/UL (ref 0–0.12)
BILIRUB SERPL-MCNC: 1.4 MG/DL (ref 0.1–1.5)
BILIRUB SERPL-MCNC: 1.6 MG/DL (ref 0.1–1.5)
BILIRUB UR QL STRIP.AUTO: NEGATIVE
BUN SERPL-MCNC: 10 MG/DL (ref 8–22)
BUN SERPL-MCNC: 5 MG/DL (ref 8–22)
CALCIUM ALBUM COR SERPL-MCNC: 8.3 MG/DL (ref 8.5–10.5)
CALCIUM ALBUM COR SERPL-MCNC: 8.7 MG/DL (ref 8.5–10.5)
CALCIUM SERPL-MCNC: 8.3 MG/DL (ref 8.4–10.2)
CALCIUM SERPL-MCNC: 8.9 MG/DL (ref 8.4–10.2)
CHLORIDE SERPL-SCNC: 103 MMOL/L (ref 96–112)
CHLORIDE SERPL-SCNC: 107 MMOL/L (ref 96–112)
CO2 SERPL-SCNC: 20 MMOL/L (ref 20–33)
CO2 SERPL-SCNC: 23 MMOL/L (ref 20–33)
COLOR UR: YELLOW
COMMENT 1642: NORMAL
CREAT SERPL-MCNC: 0.43 MG/DL (ref 0.5–1.4)
CREAT SERPL-MCNC: 0.81 MG/DL (ref 0.5–1.4)
EOSINOPHIL # BLD AUTO: 0.22 K/UL (ref 0–0.51)
EOSINOPHIL NFR BLD: 1.2 % (ref 0–6.9)
EPI CELLS #/AREA URNS HPF: ABNORMAL /HPF
ERYTHROCYTE [DISTWIDTH] IN BLOOD BY AUTOMATED COUNT: 64.1 FL (ref 35.9–50)
ERYTHROCYTE [DISTWIDTH] IN BLOOD BY AUTOMATED COUNT: 70.2 FL (ref 35.9–50)
FLUAV RNA SPEC QL NAA+PROBE: NEGATIVE
FLUBV RNA SPEC QL NAA+PROBE: NEGATIVE
GFR SERPLBLD CREATININE-BSD FMLA CKD-EPI: 106 ML/MIN/1.73 M 2
GFR SERPLBLD CREATININE-BSD FMLA CKD-EPI: 142 ML/MIN/1.73 M 2
GLOBULIN SER CALC-MCNC: 3 G/DL (ref 1.9–3.5)
GLOBULIN SER CALC-MCNC: 3.3 G/DL (ref 1.9–3.5)
GLUCOSE SERPL-MCNC: 90 MG/DL (ref 65–99)
GLUCOSE SERPL-MCNC: 96 MG/DL (ref 65–99)
GLUCOSE UR STRIP.AUTO-MCNC: NEGATIVE MG/DL
HCT VFR BLD AUTO: 20 % (ref 37–47)
HCT VFR BLD AUTO: 22.2 % (ref 37–47)
HGB BLD-MCNC: 7.2 G/DL (ref 12–16)
HGB BLD-MCNC: 7.9 G/DL (ref 12–16)
HGB RETIC QN AUTO: 31.3 PG/CELL (ref 29–35)
IMM GRANULOCYTES # BLD AUTO: 0.23 K/UL (ref 0–0.11)
IMM GRANULOCYTES NFR BLD AUTO: 1.2 % (ref 0–0.9)
IMM RETICS NFR: 25.6 % (ref 9.3–17.4)
KETONES UR STRIP.AUTO-MCNC: ABNORMAL MG/DL
LACTATE SERPL-SCNC: 0.6 MMOL/L (ref 0.5–2)
LACTATE SERPL-SCNC: 0.6 MMOL/L (ref 0.5–2)
LEUKOCYTE ESTERASE UR QL STRIP.AUTO: ABNORMAL
LYMPHOCYTES # BLD AUTO: 3.66 K/UL (ref 1–4.8)
LYMPHOCYTES NFR BLD: 19.9 % (ref 22–41)
MCH RBC QN AUTO: 32 PG (ref 27–33)
MCH RBC QN AUTO: 32.6 PG (ref 27–33)
MCHC RBC AUTO-ENTMCNC: 35.6 G/DL (ref 33.6–35)
MCHC RBC AUTO-ENTMCNC: 36 G/DL (ref 33.6–35)
MCV RBC AUTO: 88.9 FL (ref 81.4–97.8)
MCV RBC AUTO: 91.7 FL (ref 81.4–97.8)
MICRO URNS: ABNORMAL
MONOCYTES # BLD AUTO: 2.01 K/UL (ref 0–0.85)
MONOCYTES NFR BLD AUTO: 10.9 % (ref 0–13.4)
NEUTROPHILS # BLD AUTO: 12.23 K/UL (ref 2–7.15)
NEUTROPHILS NFR BLD: 66.4 % (ref 44–72)
NITRITE UR QL STRIP.AUTO: NEGATIVE
NRBC # BLD AUTO: 0.35 K/UL
NRBC BLD-RTO: 1.9 /100 WBC
PH UR STRIP.AUTO: 6 [PH] (ref 5–8)
PLATELET # BLD AUTO: 269 K/UL (ref 164–446)
PLATELET # BLD AUTO: 301 K/UL (ref 164–446)
PMV BLD AUTO: 9 FL (ref 9–12.9)
PMV BLD AUTO: 9.9 FL (ref 9–12.9)
POTASSIUM SERPL-SCNC: 4 MMOL/L (ref 3.6–5.5)
POTASSIUM SERPL-SCNC: 4.3 MMOL/L (ref 3.6–5.5)
PROCALCITONIN SERPL-MCNC: 0.14 NG/ML
PROT SERPL-MCNC: 7 G/DL (ref 6–8.2)
PROT SERPL-MCNC: 7.5 G/DL (ref 6–8.2)
PROT UR QL STRIP: NEGATIVE MG/DL
RBC # BLD AUTO: 2.25 M/UL (ref 4.2–5.4)
RBC # BLD AUTO: 2.42 M/UL (ref 4.2–5.4)
RBC # URNS HPF: ABNORMAL /HPF
RBC UR QL AUTO: ABNORMAL
RETICS # AUTO: 0.34 M/UL (ref 0.04–0.06)
RETICS/RBC NFR: 13.9 % (ref 0.8–2.1)
RSV RNA SPEC QL NAA+PROBE: NEGATIVE
SARS-COV-2 RNA RESP QL NAA+PROBE: NOTDETECTED
SODIUM SERPL-SCNC: 131 MMOL/L (ref 135–145)
SODIUM SERPL-SCNC: 139 MMOL/L (ref 135–145)
SP GR UR STRIP.AUTO: 1.02
SPECIMEN SOURCE: NORMAL
WBC # BLD AUTO: 18.1 K/UL (ref 4.8–10.8)
WBC # BLD AUTO: 18.4 K/UL (ref 4.8–10.8)
WBC #/AREA URNS HPF: ABNORMAL /HPF

## 2022-12-14 PROCEDURE — 99220 PR INITIAL OBSERVATION CARE,LEVL III: CPT | Performed by: HOSPITALIST

## 2022-12-14 PROCEDURE — 770020 HCHG ROOM/CARE - TELE (206)

## 2022-12-14 PROCEDURE — 700105 HCHG RX REV CODE 258: Performed by: STUDENT IN AN ORGANIZED HEALTH CARE EDUCATION/TRAINING PROGRAM

## 2022-12-14 PROCEDURE — 84145 PROCALCITONIN (PCT): CPT

## 2022-12-14 PROCEDURE — 96375 TX/PRO/DX INJ NEW DRUG ADDON: CPT

## 2022-12-14 PROCEDURE — 85046 RETICYTE/HGB CONCENTRATE: CPT

## 2022-12-14 PROCEDURE — 87040 BLOOD CULTURE FOR BACTERIA: CPT | Mod: 91

## 2022-12-14 PROCEDURE — 96365 THER/PROPH/DIAG IV INF INIT: CPT

## 2022-12-14 PROCEDURE — 71045 X-RAY EXAM CHEST 1 VIEW: CPT

## 2022-12-14 PROCEDURE — 36415 COLL VENOUS BLD VENIPUNCTURE: CPT

## 2022-12-14 PROCEDURE — 700111 HCHG RX REV CODE 636 W/ 250 OVERRIDE (IP): Performed by: HOSPITALIST

## 2022-12-14 PROCEDURE — C9803 HOPD COVID-19 SPEC COLLECT: HCPCS | Performed by: HOSPITALIST

## 2022-12-14 PROCEDURE — A9270 NON-COVERED ITEM OR SERVICE: HCPCS | Performed by: HOSPITALIST

## 2022-12-14 PROCEDURE — 700111 HCHG RX REV CODE 636 W/ 250 OVERRIDE (IP): Performed by: STUDENT IN AN ORGANIZED HEALTH CARE EDUCATION/TRAINING PROGRAM

## 2022-12-14 PROCEDURE — 96376 TX/PRO/DX INJ SAME DRUG ADON: CPT

## 2022-12-14 PROCEDURE — 700102 HCHG RX REV CODE 250 W/ 637 OVERRIDE(OP): Performed by: HOSPITALIST

## 2022-12-14 PROCEDURE — 80053 COMPREHEN METABOLIC PANEL: CPT

## 2022-12-14 PROCEDURE — 700105 HCHG RX REV CODE 258: Performed by: HOSPITALIST

## 2022-12-14 PROCEDURE — 302131 K PAD MOTOR: Performed by: HOSPITALIST

## 2022-12-14 PROCEDURE — 96368 THER/DIAG CONCURRENT INF: CPT

## 2022-12-14 PROCEDURE — 302152 K-PAD 12X17: Performed by: HOSPITALIST

## 2022-12-14 PROCEDURE — 0241U HCHG SARS-COV-2 COVID-19 NFCT DS RESP RNA 4 TRGT MIC: CPT

## 2022-12-14 PROCEDURE — 81001 URINALYSIS AUTO W/SCOPE: CPT

## 2022-12-14 PROCEDURE — 85027 COMPLETE CBC AUTOMATED: CPT

## 2022-12-14 PROCEDURE — 85025 COMPLETE CBC W/AUTO DIFF WBC: CPT

## 2022-12-14 PROCEDURE — 99285 EMERGENCY DEPT VISIT HI MDM: CPT

## 2022-12-14 PROCEDURE — 83605 ASSAY OF LACTIC ACID: CPT

## 2022-12-14 PROCEDURE — 94760 N-INVAS EAR/PLS OXIMETRY 1: CPT

## 2022-12-14 RX ORDER — IBUPROFEN 200 MG
200 TABLET ORAL EVERY 6 HOURS PRN
Status: ON HOLD | COMMUNITY
End: 2022-12-18

## 2022-12-14 RX ORDER — HYDROCODONE BITARTRATE AND ACETAMINOPHEN 5; 325 MG/1; MG/1
1 TABLET ORAL EVERY 6 HOURS PRN
Status: DISCONTINUED | OUTPATIENT
Start: 2022-12-14 | End: 2022-12-14

## 2022-12-14 RX ORDER — SODIUM CHLORIDE 9 MG/ML
INJECTION, SOLUTION INTRAVENOUS CONTINUOUS
Status: DISCONTINUED | OUTPATIENT
Start: 2022-12-14 | End: 2022-12-16 | Stop reason: HOSPADM

## 2022-12-14 RX ORDER — HYDROMORPHONE HYDROCHLORIDE 1 MG/ML
0.5 INJECTION, SOLUTION INTRAMUSCULAR; INTRAVENOUS; SUBCUTANEOUS
Status: DISCONTINUED | OUTPATIENT
Start: 2022-12-14 | End: 2022-12-14

## 2022-12-14 RX ORDER — AZITHROMYCIN 250 MG/1
500 TABLET, FILM COATED ORAL DAILY
Status: DISCONTINUED | OUTPATIENT
Start: 2022-12-15 | End: 2022-12-16 | Stop reason: HOSPADM

## 2022-12-14 RX ORDER — HYDROXYUREA 500 MG/1
500 CAPSULE ORAL DAILY
Status: DISCONTINUED | OUTPATIENT
Start: 2022-12-14 | End: 2022-12-14

## 2022-12-14 RX ORDER — KETOROLAC TROMETHAMINE 30 MG/ML
15 INJECTION, SOLUTION INTRAMUSCULAR; INTRAVENOUS ONCE
Status: COMPLETED | OUTPATIENT
Start: 2022-12-14 | End: 2022-12-14

## 2022-12-14 RX ORDER — HYDROCODONE BITARTRATE AND ACETAMINOPHEN 10; 325 MG/1; MG/1
1 TABLET ORAL EVERY 6 HOURS PRN
Status: DISCONTINUED | OUTPATIENT
Start: 2022-12-14 | End: 2022-12-14

## 2022-12-14 RX ORDER — ONDANSETRON 2 MG/ML
4 INJECTION INTRAMUSCULAR; INTRAVENOUS ONCE
Status: COMPLETED | OUTPATIENT
Start: 2022-12-14 | End: 2022-12-14

## 2022-12-14 RX ORDER — PROMETHAZINE HYDROCHLORIDE 25 MG/1
12.5-25 TABLET ORAL EVERY 4 HOURS PRN
Status: DISCONTINUED | OUTPATIENT
Start: 2022-12-14 | End: 2022-12-16 | Stop reason: HOSPADM

## 2022-12-14 RX ORDER — ONDANSETRON 2 MG/ML
4 INJECTION INTRAMUSCULAR; INTRAVENOUS EVERY 4 HOURS PRN
Status: DISCONTINUED | OUTPATIENT
Start: 2022-12-14 | End: 2022-12-16 | Stop reason: HOSPADM

## 2022-12-14 RX ORDER — LORATADINE 10 MG/1
10 TABLET ORAL PRN
COMMUNITY
End: 2023-11-30

## 2022-12-14 RX ORDER — HYDROMORPHONE HYDROCHLORIDE 1 MG/ML
1 INJECTION, SOLUTION INTRAMUSCULAR; INTRAVENOUS; SUBCUTANEOUS ONCE
Status: COMPLETED | OUTPATIENT
Start: 2022-12-14 | End: 2022-12-14

## 2022-12-14 RX ORDER — FLUTICASONE PROPIONATE 44 UG/1
2 AEROSOL, METERED RESPIRATORY (INHALATION) PRN
COMMUNITY
End: 2023-11-30

## 2022-12-14 RX ORDER — FOLIC ACID 1 MG/1
1 TABLET ORAL DAILY
Status: DISCONTINUED | OUTPATIENT
Start: 2022-12-14 | End: 2022-12-16 | Stop reason: HOSPADM

## 2022-12-14 RX ORDER — CYCLOBENZAPRINE HCL 10 MG
10 TABLET ORAL 3 TIMES DAILY PRN
Status: DISCONTINUED | OUTPATIENT
Start: 2022-12-14 | End: 2022-12-16 | Stop reason: HOSPADM

## 2022-12-14 RX ORDER — SODIUM CHLORIDE 9 MG/ML
1000 INJECTION, SOLUTION INTRAVENOUS ONCE
Status: COMPLETED | OUTPATIENT
Start: 2022-12-14 | End: 2022-12-14

## 2022-12-14 RX ORDER — ONDANSETRON 4 MG/1
4 TABLET, ORALLY DISINTEGRATING ORAL EVERY 4 HOURS PRN
Status: DISCONTINUED | OUTPATIENT
Start: 2022-12-14 | End: 2022-12-16 | Stop reason: HOSPADM

## 2022-12-14 RX ORDER — SODIUM CHLORIDE 9 MG/ML
INJECTION, SOLUTION INTRAVENOUS CONTINUOUS
Status: ACTIVE | OUTPATIENT
Start: 2022-12-14 | End: 2022-12-14

## 2022-12-14 RX ORDER — HYDROMORPHONE HYDROCHLORIDE 1 MG/ML
0.5 INJECTION, SOLUTION INTRAMUSCULAR; INTRAVENOUS; SUBCUTANEOUS ONCE
Status: COMPLETED | OUTPATIENT
Start: 2022-12-14 | End: 2022-12-14

## 2022-12-14 RX ORDER — AMOXICILLIN 250 MG
2 CAPSULE ORAL 2 TIMES DAILY
Status: DISCONTINUED | OUTPATIENT
Start: 2022-12-14 | End: 2022-12-16 | Stop reason: HOSPADM

## 2022-12-14 RX ORDER — ACETAMINOPHEN 325 MG/1
650 TABLET ORAL EVERY 6 HOURS PRN
Status: DISCONTINUED | OUTPATIENT
Start: 2022-12-14 | End: 2022-12-16 | Stop reason: HOSPADM

## 2022-12-14 RX ORDER — HYDROXYUREA 500 MG/1
1500 CAPSULE ORAL EVERY EVENING
Status: DISCONTINUED | OUTPATIENT
Start: 2022-12-14 | End: 2022-12-16 | Stop reason: HOSPADM

## 2022-12-14 RX ORDER — POLYETHYLENE GLYCOL 3350 17 G/17G
1 POWDER, FOR SOLUTION ORAL
Status: DISCONTINUED | OUTPATIENT
Start: 2022-12-14 | End: 2022-12-16 | Stop reason: HOSPADM

## 2022-12-14 RX ORDER — FLUTICASONE PROPIONATE 44 UG/1
2 AEROSOL, METERED RESPIRATORY (INHALATION) DAILY
Status: DISCONTINUED | OUTPATIENT
Start: 2022-12-14 | End: 2022-12-16 | Stop reason: HOSPADM

## 2022-12-14 RX ORDER — PROCHLORPERAZINE EDISYLATE 5 MG/ML
5-10 INJECTION INTRAMUSCULAR; INTRAVENOUS EVERY 4 HOURS PRN
Status: DISCONTINUED | OUTPATIENT
Start: 2022-12-14 | End: 2022-12-16 | Stop reason: HOSPADM

## 2022-12-14 RX ORDER — PROMETHAZINE HYDROCHLORIDE 25 MG/1
12.5-25 SUPPOSITORY RECTAL EVERY 4 HOURS PRN
Status: DISCONTINUED | OUTPATIENT
Start: 2022-12-14 | End: 2022-12-16 | Stop reason: HOSPADM

## 2022-12-14 RX ORDER — DIPHENHYDRAMINE HCL 25 MG
25-50 TABLET ORAL EVERY 6 HOURS PRN
Status: ON HOLD | COMMUNITY
End: 2022-12-17

## 2022-12-14 RX ORDER — BISACODYL 10 MG
10 SUPPOSITORY, RECTAL RECTAL
Status: DISCONTINUED | OUTPATIENT
Start: 2022-12-14 | End: 2022-12-16 | Stop reason: HOSPADM

## 2022-12-14 RX ORDER — LORATADINE 10 MG/1
10 TABLET ORAL DAILY
Status: DISCONTINUED | OUTPATIENT
Start: 2022-12-14 | End: 2022-12-16 | Stop reason: HOSPADM

## 2022-12-14 RX ADMIN — SENNOSIDES AND DOCUSATE SODIUM 1 TABLET: 50; 8.6 TABLET ORAL at 09:47

## 2022-12-14 RX ADMIN — HYDROMORPHONE HYDROCHLORIDE 0.5 MG: 1 INJECTION, SOLUTION INTRAMUSCULAR; INTRAVENOUS; SUBCUTANEOUS at 12:43

## 2022-12-14 RX ADMIN — CYCLOBENZAPRINE 10 MG: 10 TABLET, FILM COATED ORAL at 20:11

## 2022-12-14 RX ADMIN — HYDROXYUREA 1500 MG: 500 CAPSULE ORAL at 18:13

## 2022-12-14 RX ADMIN — HYDROMORPHONE HYDROCHLORIDE 1 MG: 1 INJECTION, SOLUTION INTRAMUSCULAR; INTRAVENOUS; SUBCUTANEOUS at 02:30

## 2022-12-14 RX ADMIN — SODIUM CHLORIDE 1000 ML: 9 INJECTION, SOLUTION INTRAVENOUS at 02:30

## 2022-12-14 RX ADMIN — ONDANSETRON 4 MG: 2 INJECTION INTRAMUSCULAR; INTRAVENOUS at 02:30

## 2022-12-14 RX ADMIN — HYDROCODONE BITARTRATE AND ACETAMINOPHEN 1 TABLET: 10; 325 TABLET ORAL at 07:00

## 2022-12-14 RX ADMIN — SODIUM CHLORIDE: 9 INJECTION, SOLUTION INTRAVENOUS at 12:27

## 2022-12-14 RX ADMIN — HYDROMORPHONE HYDROCHLORIDE 0.5 MG: 1 INJECTION, SOLUTION INTRAMUSCULAR; INTRAVENOUS; SUBCUTANEOUS at 13:55

## 2022-12-14 RX ADMIN — HYDROMORPHONE HYDROCHLORIDE 0.5 MG: 1 INJECTION, SOLUTION INTRAMUSCULAR; INTRAVENOUS; SUBCUTANEOUS at 09:48

## 2022-12-14 RX ADMIN — RIVAROXABAN 10 MG: 10 TABLET, FILM COATED ORAL at 18:13

## 2022-12-14 RX ADMIN — HYDROMORPHONE HYDROCHLORIDE 1 MG: 1 INJECTION, SOLUTION INTRAMUSCULAR; INTRAVENOUS; SUBCUTANEOUS at 04:30

## 2022-12-14 RX ADMIN — Medication: at 14:30

## 2022-12-14 RX ADMIN — SODIUM CHLORIDE: 9 INJECTION, SOLUTION INTRAVENOUS at 14:28

## 2022-12-14 RX ADMIN — HYDROMORPHONE HYDROCHLORIDE 0.5 MG: 1 INJECTION, SOLUTION INTRAMUSCULAR; INTRAVENOUS; SUBCUTANEOUS at 08:27

## 2022-12-14 RX ADMIN — KETOROLAC TROMETHAMINE 15 MG: 30 INJECTION, SOLUTION INTRAMUSCULAR at 02:30

## 2022-12-14 RX ADMIN — SODIUM CHLORIDE: 9 INJECTION, SOLUTION INTRAVENOUS at 08:27

## 2022-12-14 RX ADMIN — AZITHROMYCIN MONOHYDRATE 500 MG: 500 INJECTION, POWDER, LYOPHILIZED, FOR SOLUTION INTRAVENOUS at 09:53

## 2022-12-14 RX ADMIN — HYDROMORPHONE HYDROCHLORIDE 0.5 MG: 1 INJECTION, SOLUTION INTRAMUSCULAR; INTRAVENOUS; SUBCUTANEOUS at 11:29

## 2022-12-14 RX ADMIN — CYCLOBENZAPRINE 10 MG: 10 TABLET, FILM COATED ORAL at 10:49

## 2022-12-14 RX ADMIN — FOLIC ACID 1 MG: 1 TABLET ORAL at 09:47

## 2022-12-14 RX ADMIN — CYCLOBENZAPRINE 10 MG: 10 TABLET, FILM COATED ORAL at 14:56

## 2022-12-14 RX ADMIN — CEFTRIAXONE SODIUM 1000 MG: 1 INJECTION, POWDER, FOR SOLUTION INTRAMUSCULAR; INTRAVENOUS at 09:51

## 2022-12-14 ASSESSMENT — COGNITIVE AND FUNCTIONAL STATUS - GENERAL
CLIMB 3 TO 5 STEPS WITH RAILING: A LITTLE
DRESSING REGULAR LOWER BODY CLOTHING: A LITTLE
HELP NEEDED FOR BATHING: A LITTLE
MOBILITY SCORE: 21
WALKING IN HOSPITAL ROOM: A LITTLE
DRESSING REGULAR LOWER BODY CLOTHING: A LITTLE
CLIMB 3 TO 5 STEPS WITH RAILING: A LITTLE
HELP NEEDED FOR BATHING: A LITTLE
MOBILITY SCORE: 21
SUGGESTED CMS G CODE MODIFIER MOBILITY: CJ
DAILY ACTIVITIY SCORE: 22
SUGGESTED CMS G CODE MODIFIER DAILY ACTIVITY: CJ
STANDING UP FROM CHAIR USING ARMS: A LITTLE
STANDING UP FROM CHAIR USING ARMS: A LITTLE
WALKING IN HOSPITAL ROOM: A LITTLE
SUGGESTED CMS G CODE MODIFIER MOBILITY: CJ

## 2022-12-14 ASSESSMENT — ENCOUNTER SYMPTOMS
VOMITING: 0
SHORTNESS OF BREATH: 0
FLANK PAIN: 1
DOUBLE VISION: 0
DIZZINESS: 0
EYES NEGATIVE: 1
NECK PAIN: 0
NEUROLOGICAL NEGATIVE: 1
INSOMNIA: 0
CONSTIPATION: 0
PALPITATIONS: 0
SEIZURES: 0
CARDIOVASCULAR NEGATIVE: 1
SORE THROAT: 0
HEARTBURN: 0
WEAKNESS: 0
DIARRHEA: 0
CHILLS: 0
COUGH: 0
BLOOD IN STOOL: 0
LOSS OF CONSCIOUSNESS: 0
DEPRESSION: 0
FOCAL WEAKNESS: 0
NERVOUS/ANXIOUS: 1
BRUISES/BLEEDS EASILY: 0
ABDOMINAL PAIN: 0
WHEEZING: 0
NAUSEA: 0
GASTROINTESTINAL NEGATIVE: 1
BLURRED VISION: 0
HEMOPTYSIS: 0
RESPIRATORY NEGATIVE: 1
MYALGIAS: 1
HEADACHES: 0
BACK PAIN: 1
INSOMNIA: 1
DIAPHORESIS: 0
FEVER: 0

## 2022-12-14 ASSESSMENT — LIFESTYLE VARIABLES
HOW MANY TIMES IN THE PAST YEAR HAVE YOU HAD 5 OR MORE DRINKS IN A DAY: 0
AVERAGE NUMBER OF DAYS PER WEEK YOU HAVE A DRINK CONTAINING ALCOHOL: 0
TOTAL SCORE: 0
CONSUMPTION TOTAL: NEGATIVE
TOTAL SCORE: 0
ON A TYPICAL DAY WHEN YOU DRINK ALCOHOL HOW MANY DRINKS DO YOU HAVE: 0
HAVE YOU EVER FELT YOU SHOULD CUT DOWN ON YOUR DRINKING: NO
HAVE PEOPLE ANNOYED YOU BY CRITICIZING YOUR DRINKING: NO
TOTAL SCORE: 0
EVER HAD A DRINK FIRST THING IN THE MORNING TO STEADY YOUR NERVES TO GET RID OF A HANGOVER: NO
EVER FELT BAD OR GUILTY ABOUT YOUR DRINKING: NO
ALCOHOL_USE: NO

## 2022-12-14 ASSESSMENT — PATIENT HEALTH QUESTIONNAIRE - PHQ9
2. FEELING DOWN, DEPRESSED, IRRITABLE, OR HOPELESS: NOT AT ALL
SUM OF ALL RESPONSES TO PHQ9 QUESTIONS 1 AND 2: 0
1. LITTLE INTEREST OR PLEASURE IN DOING THINGS: NOT AT ALL

## 2022-12-14 ASSESSMENT — FIBROSIS 4 INDEX: FIB4 SCORE: 0.46

## 2022-12-14 ASSESSMENT — PAIN DESCRIPTION - PAIN TYPE: TYPE: ACUTE PAIN

## 2022-12-14 NOTE — ED NOTES
"Pt wanting to leave, \"I can't get comfortable, there is no bed upstairs for me\" - this RN attempted to call hospitalist w/o success; pt refusing to sign AMA form until she speaks w/ doctor. PIV removed per pt's request w/ alcohol swab to prevent skin tearing.  "

## 2022-12-14 NOTE — ED TRIAGE NOTES
"Pt into 3 with  Chief Complaint   Patient presents with    Back Pain     Pt BIB [facility] with low back pain that radiates from low back towards buttox. Pt reports having this pain for a few days. Pt reports pain worsening today and upon waking up was in 10/10 pain at home. Pt tried ibuprofen at home as well as hydrocodone with no relief. Pt was given 3 mg of morphine PTA. Pt has a hx of sickle cell anemia.      /62   Pulse 81   Temp 37 °C (98.6 °F) (Temporal)   Resp 18   Ht 1.575 m (5' 2\")   Wt 51.7 kg (114 lb)   SpO2 98%   BMI 20.85 kg/m²     "
(2) good, crying

## 2022-12-14 NOTE — ED NOTES
Pt medicated per MAR, lab at [facility] to draw BC x2 and lactic acid; pt medicated for pain per PRN orders; pt placed on 2L O2 for comfort and de-sat r/t pain meds and sleeping.    Pt given heat packs for lower back pain

## 2022-12-14 NOTE — DISCHARGE PLANNING
Met with pt and mom. Pt deferred for mom to answer questions. Mom said that pt is independent with ADLs but pt does not drive so MTM provides transportation for her. Pt does not use any DMEs.   Pt lives alone and able to care for herself. Mom lives in the same apartment complex so she can assist pt prn.       Addendum: received a call from Melissa WALDRON at [facility]. She said that pt called her because pt feels she is being given the run around due to still being at the ED. Explained that we are waiting for a clean room upstairs. Notified ER.       Care Transition Team Assessment    Information Source  Orientation Level: Oriented X4  Information Given By: Patient  Who is responsible for making decisions for patient? : Patient    Readmission Evaluation  Is this a readmission?: No    Elopement Risk  Legal Hold: No  Ambulatory or Self Mobile in Wheelchair: No-Not an Elopement Risk    Interdisciplinary Discharge Planning  Does Admitting Nurse Feel This Could be a Complex Discharge?: No  Primary Care Physician: Dr. Cullen Castro  Lives with - Patient's Self Care Capacity: Parents  Support Systems: Parent  Housing / Facility: 1 Story Apartment / Condo  Do You Take your Prescribed Medications Regularly: Yes  Able to Return to Previous ADL's: Yes  Mobility Issues: No  Prior Services: Home-Independent  Patient Prefers to be Discharged to:: Home  Assistance Needed: Yes  Durable Medical Equipment: Not Applicable    Discharge Preparedness  What is your plan after discharge?: Home with help  What are your discharge supports?: Parent  Prior Functional Level: Ambulatory, Independent with Activities of Daily Living, Independent with Medication Management  Difficulity with ADLs: None  Difficulity with IADLs: Driving    Functional Assesment  Prior Functional Level: Ambulatory, Independent with Activities of Daily Living, Independent with Medication Management    Finances  Financial Barriers to Discharge: No  Prescription Coverage:  Yes    Vision / Hearing Impairment  Vision Impairment : No  Hearing Impairment : No    Values / Beliefs / Concerns  Values / Beliefs Concerns : No    Advance Directive  Advance Directive?: None    Domestic Abuse  Have you ever been the victim of abuse or violence?: No         Discharge Risks or Barriers  Discharge risks or barriers?: No  Patient risk factors: Vulnerable adult    Anticipated Discharge Information  Discharge Disposition: Discharged to home/self care (01)

## 2022-12-14 NOTE — ED NOTES
Hospital bed ordered for pt; [facility] called regarding pt bed placement, [facility] states no known time of bed assignment, tele floor is full and needs a d/c before pt can be placed in and inpatient room

## 2022-12-14 NOTE — ASSESSMENT & PLAN NOTE
Patient has a mild urinary tract infection  Possible development of pneumonia is present and thus the white blood cell count is most likely infectious in nature  Patient is on Rocephin azithromycin  Blood cultures urine cultures and sputum cultures at this point are pending

## 2022-12-14 NOTE — PROGRESS NOTES
Hospital Medicine Daily Progress Note    Date of Service  12/14/2022    Chief Complaint  Henry Anand is a 22 y.o. female admitted 12/14/2022 with generalized muscle aches and backache    Hospital Course  Patient with known sickle cell disease has now entered into sickle cell crisis.  The patient has severe pain.  The patient is also hypotensive and dehydrated appearing.  The patient at this point will need adequate fluid resuscitation.  Patient will need also pain management.  Patient will need muscle relaxants and spasm management.  The patient will be given prophylactic antibiotics given her high white blood cell count to prevent chest syndrome.    Interval Problem Update  Severe anxiety and at this point will need continued counseling and encouragement.  Pain management  Nutritional support  Fluid resuscitation and monitor intake and output  Anticoagulation to prevent DVT  Prophylactic antibiotic management to prevent chest syndrome  Anxiolytic management as needed  Spasm management    I have discussed this patient's plan of care and discharge plan at IDT rounds today with Case Management, Nursing, Nursing leadership, and other members of the IDT team.    Consultants/Specialty  None    Code Status  Full Code    Disposition  Patient is not medically cleared for discharge.   Anticipate discharge to to home with close outpatient follow-up.  I have placed the appropriate orders for post-discharge needs.    Review of Systems  Review of Systems   Constitutional:  Positive for malaise/fatigue. Negative for chills, diaphoresis and fever.   HENT: Negative.     Eyes: Negative.  Negative for double vision.   Respiratory: Negative.  Negative for cough, hemoptysis and wheezing.    Cardiovascular: Negative.  Negative for chest pain, palpitations and leg swelling.   Gastrointestinal: Negative.  Negative for abdominal pain, blood in stool, constipation, diarrhea, heartburn, nausea and vomiting.    Genitourinary:  Positive for flank pain. Negative for frequency, hematuria and urgency.   Musculoskeletal:  Positive for back pain, joint pain and myalgias.   Skin: Negative.  Negative for itching and rash.   Neurological: Negative.  Negative for dizziness, focal weakness, seizures, loss of consciousness and headaches.   Endo/Heme/Allergies: Negative.  Does not bruise/bleed easily.   Psychiatric/Behavioral:  Negative for suicidal ideas. The patient is nervous/anxious and has insomnia.    All other systems reviewed and are negative.     Physical Exam  Temp:  [37 °C (98.6 °F)] 37 °C (98.6 °F)  Pulse:  [] 107  Resp:  [13-18] 15  BP: (104-125)/(48-62) 107/59  SpO2:  [88 %-98 %] 90 %    Physical Exam  Vitals and nursing note reviewed. Exam conducted with a chaperone present.   Constitutional:       Appearance: Normal appearance. She is well-developed and underweight.   HENT:      Head: Normocephalic and atraumatic.      Right Ear: Tympanic membrane, ear canal and external ear normal.      Left Ear: Tympanic membrane, ear canal and external ear normal.      Nose: Nose normal. No congestion or rhinorrhea.      Mouth/Throat:      Mouth: Mucous membranes are moist.      Pharynx: Oropharynx is clear.   Eyes:      Extraocular Movements: Extraocular movements intact.      Conjunctiva/sclera: Conjunctivae normal.      Pupils: Pupils are equal, round, and reactive to light.   Neck:      Thyroid: No thyroid mass.      Vascular: No carotid bruit or JVD.   Cardiovascular:      Rate and Rhythm: Normal rate and regular rhythm.      Pulses: Normal pulses.      Heart sounds: Normal heart sounds, S1 normal and S2 normal.   Pulmonary:      Effort: Pulmonary effort is normal.      Breath sounds: Normal breath sounds and air entry.   Abdominal:      General: Abdomen is flat. Bowel sounds are normal.      Palpations: Abdomen is soft.   Musculoskeletal:         General: Normal range of motion.      Cervical back: Normal range of  motion and neck supple. No edema or rigidity.      Right lower leg: No edema.      Left lower leg: No edema.      Right foot: Normal range of motion. No deformity or foot drop.      Left foot: Normal range of motion. No deformity or foot drop.   Feet:      Right foot:      Skin integrity: Skin integrity normal. No ulcer.      Left foot:      Skin integrity: Skin integrity normal. No ulcer.   Lymphadenopathy:      Head:      Right side of head: No submental adenopathy.      Left side of head: No submental adenopathy.      Cervical: No cervical adenopathy.      Right cervical: No superficial cervical adenopathy.     Left cervical: No superficial cervical adenopathy.      Upper Body:      Right upper body: No supraclavicular adenopathy.      Left upper body: No supraclavicular adenopathy.      Lower Body: No right inguinal adenopathy. No left inguinal adenopathy.   Skin:     General: Skin is warm and dry.      Capillary Refill: Capillary refill takes less than 2 seconds.      Findings: No abrasion or wound.   Neurological:      General: No focal deficit present.      Mental Status: She is alert and oriented to person, place, and time. Mental status is at baseline.      GCS: GCS eye subscore is 4. GCS verbal subscore is 5. GCS motor subscore is 6.      Sensory: Sensation is intact.      Motor: Motor function is intact. No weakness.      Coordination: Coordination is intact.   Psychiatric:         Attention and Perception: She is inattentive.         Mood and Affect: Mood is anxious. Affect is labile.         Speech: Speech is rapid and pressured.         Behavior: Behavior is agitated. Behavior is cooperative.         Thought Content: Thought content normal.         Cognition and Memory: Cognition and memory normal.         Judgment: Judgment is impulsive.       Fluids  No intake or output data in the 24 hours ending 12/14/22 1133    Laboratory  Recent Labs     12/14/22  0214   WBC 18.4*   RBC 2.42*   HEMOGLOBIN 7.9*    HEMATOCRIT 22.2*   MCV 91.7   MCH 32.6   MCHC 35.6*   RDW 64.1*   PLATELETCT 301   MPV 9.9     Recent Labs     12/14/22  0219   SODIUM 139   POTASSIUM 4.3   CHLORIDE 107   CO2 23   GLUCOSE 90   BUN 10   CREATININE 0.81   CALCIUM 8.9                   Imaging  DX-CHEST-PORTABLE (1 VIEW)   Final Result         1.  No acute cardiopulmonary disease.           Assessment/Plan  * Sickle cell crisis (HCC)- (present on admission)  Assessment & Plan  Needs adequate hydration  Needs adequate pain control  Vitals monitoring every 4 hours  Continuous pulse oximetry  Anticoagulation to prevent DVT which is high risk with her current sickle cell crisis  Added hydroxyurea to the treatment    Normocytic anemia  Assessment & Plan  Monitor H&H currently no transfusion indicated      Leukocytosis  Assessment & Plan  Elevated white blood cell count is 18,400  Per guidelines prophylactic antibiotics initiated with Rocephin azithromycin to prevent pneumonia  Patient considered immunocompromised with splenic sequestration during sickle cell crisis    Mild persistent asthma without complication- (present on admission)  Assessment & Plan  Currently asymptomatic    Cannabis abuse- (present on admission)  Assessment & Plan  Counseled  Continues to use as an outpatient    Anxiety associated with depression- (present on admission)  Assessment & Plan  Anxiolytic management as needed         VTE prophylaxis: SCDs/TEDs and therapeutic anticoagulation with Eliquis    I have performed a physical exam and reviewed and updated ROS and Plan today (12/14/2022). In review of yesterday's note (12/13/2022), there are no changes except as documented above.

## 2022-12-14 NOTE — ED NOTES
Pt moved to a floor bed, linens changed as sheet had a hole. Updated by Dr. Rose that a patient is being discharged upstairs and the room will be cleaned. Explained to pt that our ETA is not specific but a few hours out. Agrees to stay until 1400 and receive treatment but if she is not in an inpatient room until then she states she will go home. Verbalized this is her right to leave if she wishes. RN updated and maintenance IVF will be started and pt is requesting pain medication. Pt mom provided fresh hot coffee. Pt and mom provided hot packs and washcloths. Lunch offered and only wants fruit, rest of tray removed.

## 2022-12-14 NOTE — ED NOTES
Medicated for c/o pain 8/10.  Warm blanket and heat packs provided.  Call light within reach.  Mother @ BS.

## 2022-12-14 NOTE — ED NOTES
Pt ambulatory to the bathroom.  ER Tech @ BS attempting to take pt to room 304-1, but pt refusing to go unless she gets pain medication.  Charge nurse to bedside to medicate her.

## 2022-12-14 NOTE — ASSESSMENT & PLAN NOTE
Patient has severe anxiety with depression in the pain at this point is exacerbating her situation.

## 2022-12-14 NOTE — ED NOTES
Jacki, ED manager at the  to speak w/ pt regarding concerns over care    Hospital bed brought to pt room; pt refusing to move to hospital bed until she speaks w/ manager.

## 2022-12-14 NOTE — ED NOTES
Pt medicated for low back spasms; given new heat packs and repositioned to comfort in bed; pt ambulated w/o difficulty to bathroom

## 2022-12-14 NOTE — ASSESSMENT & PLAN NOTE
Hemoglobin trending down with sickle cell crisis most recently 6.8.  Reticulocyte count above 300  Every 6 hours H&H and reticulocyte count will be done  If hemoglobin drops below 6.5 we will transfuse her 1 unit of packed red blood cells

## 2022-12-14 NOTE — ED NOTES
Notified by RN that pt is requesting to speak with Manager. Pt is upset about her pain level, lack of food provided, being in the ED even though she has been admitted. Also upset she is still on a gurney. I apologized and explained the capacity of the hospital and explained to the pt that the admission orders are being followed regardless of physical location. I let her know that we have a physical hospital bed for her so she may be more comfortable however we do not have a physical inpatient room and I do not have an ETA of when a room upstairs will be available. She is wanting to speak with Dr. Rose now, paged and call returned and will come down. I rounded with nursing team and food has been offered as she had specific requests. The kitchen was contacted for requests however breakfast preparation has ended and nutrition is working on lunch so requests will come with lunch meal. Patient was offered crackers and a fruit cup. Pt has been treated with pain medications as ordered per bedside RN, comfort items for pain as well.

## 2022-12-14 NOTE — ED PROVIDER NOTES
ED Provider Note    CHIEF COMPLAINT  Chief Complaint   Patient presents with    Back Pain     Pt BIB [facility] with low back pain that radiates from low back towards buttox. Pt reports having this pain for a few days. Pt reports pain worsening today and upon waking up was in 10/10 pain at home. Pt tried ibuprofen at home as well as hydrocodone with no relief. Pt was given 3 mg of morphine PTA. Pt has a hx of sickle cell anemia.        HPI  Henry Anand is a 22 y.o. female who presents with pain in her lower buttocks which radiates down both legs.  She states it started yesterday morning. The pain awoke her from sleep.  It has been constant and severe.  She was supposed to be evaluated by her sickle cell provider but the pain was too severe to go in person per her mother and so she had an appointment via Zoom.  The doctor prescribed hydrocodone but has not been relieved. Also has use ibuprofen. She describes the pain as constant, severe, 10 out of 10 in severity.  No clear aggravating or alleviating factors.  She received morphine prior to arrival without improvement.  History is limited as the patient is in pain.  Mother states that she has never before been hospitalized for sickle cell crisis.    She has not had any fever, chills, recent illness.  She denies any chest pain or shortness of breath.  She denies any midline back pain.     REVIEW OF SYSTEMS  Unable to obtain    PAST MEDICAL HISTORY  Past Medical History:   Diagnosis Date    Asthma 2021    asthma    Gallstones with biliary obstruction 11/2014    Heart murmur     Sickle cell anemia without crisis (HCC) 2001       SOCIAL HISTORY  Social History     Tobacco Use    Smoking status: Never    Smokeless tobacco: Never   Vaping Use    Vaping Use: Never used   Substance Use Topics    Alcohol use: Not Currently    Drug use: Yes     Types: Inhaled     Comment: job , 2 joints       SURGICAL HISTORY  Past Surgical History:   Procedure  "Laterality Date    SEPTOTURBINOPLASTY Bilateral 5/4/2021    Procedure: SEPTOPLASTY, NOSE, WITH TURBINOPLASTY;  Surgeon: Precious Mackenzie M.D.;  Location: SURGERY SAME DAY [facility];  Service: Ent    ANTROSTOMY  5/4/2021    Procedure: MAXILLARY ANTROSTOMY-ENDOSCOPIC;  Surgeon: Precious Mackenzie M.D.;  Location: SURGERY SAME DAY [facility];  Service: Ent    ETHMOIDECTOMY, ENDOSCOPIC  5/4/2021    Procedure: ETHMOIDECTOMY, ENDOSCOPIC-FRONTAL, LANDMARK.;  Surgeon: Precious Mackenzie M.D.;  Location: SURGERY SAME DAY [facility];  Service: Ent    OTHER  2018    fall, facial surgeries    CHOLECYSTECTOMY  11/3/2014    ERCP N/A 10/30/2014       CURRENT MEDICATIONS  Home Medications       Reviewed by Sebastian Zeng (Pharmacy Tech) on 12/14/22 at 0737  Med List Status: Complete     Medication Last Dose Status   albuterol 108 (90 Base) MCG/ACT Aero Soln inhalation aerosol 12/13/2022 Active   Cholecalciferol (VITAMIN D3) 125 MCG (5000 UT) Cap 12/13/2022 Active   diphenhydrAMINE (BENADRYL) 25 MG Tab > 2 weeks Active   fluticasone (FLOVENT HFA) 44 MCG/ACT Aerosol > 2 weeks Active   folic acid (FOLVITE) 1 MG Tab 12/13/2022 Active   HYDROcodone-acetaminophen (NORCO) 5-325 MG Tab per tablet 12/13/2022 Active   hydroxyurea (HYDREA) 500 MG Cap 12/13/2022 Active   ibuprofen (MOTRIN) 200 MG Tab 12/13/2022 Active   loratadine (CLARITIN) 10 MG Tab > 3 weeks Active   penicillin v potassium (VEETID) 250 MG Tab 12/13/2022 Active                    ALLERGIES  Allergies   Allergen Reactions    Haloperidol Unspecified     Received Haldol x 2 doses in [facility] and developed an acute dystonic reaction, treated with diphenhydramine       PHYSICAL EXAM  VITAL SIGNS: /57   Pulse 76   Temp 37 °C (98.6 °F) (Temporal)   Resp 15   Ht 1.575 m (5' 2\")   Wt 51.7 kg (114 lb)   SpO2 94%   BMI 20.85 kg/m²    Constitutional: Awake and alert . Uncomfortable appearing   HENT: Normal inspection  dry mucous membranes  Eyes: Normal inspection  Neck: " Grossly normal range of motion.  Cardiovascular: Normal heart rate, Normal rhythm.  Symmetric peripheral pulses.   Thorax & Lungs: No respiratory distress, No wheezing, No rales, No rhonchi, No chest tenderness.   Abdomen: Soft, non-distended, nontender, no mass  Skin: No obvious rash.  Back: No midline tenderness, No CVA tenderness.   Extremities: 5/ 5 strength to bilateral lower extremities with hip flexion, ankle dorsi flexion and plantar flexion.  Sensation intact.  Neurologic: Grossly normal   Psychiatric: Normal for situation    RADIOLOGY/PROCEDURES  DX-CHEST-PORTABLE (1 VIEW)   Final Result         1.  No acute cardiopulmonary disease.           Imaging is interpreted by radiologist    Labs:  Results for orders placed or performed during the hospital encounter of 12/14/22   CBC WITH DIFFERENTIAL   Result Value Ref Range    WBC 18.4 (H) 4.8 - 10.8 K/uL    RBC 2.42 (L) 4.20 - 5.40 M/uL    Hemoglobin 7.9 (L) 12.0 - 16.0 g/dL    Hematocrit 22.2 (L) 37.0 - 47.0 %    MCV 91.7 81.4 - 97.8 fL    MCH 32.6 27.0 - 33.0 pg    MCHC 35.6 (H) 33.6 - 35.0 g/dL    RDW 64.1 (H) 35.9 - 50.0 fL    Platelet Count 301 164 - 446 K/uL    MPV 9.9 9.0 - 12.9 fL    Neutrophils-Polys 66.40 44.00 - 72.00 %    Lymphocytes 19.90 (L) 22.00 - 41.00 %    Monocytes 10.90 0.00 - 13.40 %    Eosinophils 1.20 0.00 - 6.90 %    Basophils 0.40 0.00 - 1.80 %    Immature Granulocytes 1.20 (H) 0.00 - 0.90 %    Nucleated RBC 1.90 /100 WBC    Neutrophils (Absolute) 12.23 (H) 2.00 - 7.15 K/uL    Lymphs (Absolute) 3.66 1.00 - 4.80 K/uL    Monos (Absolute) 2.01 (H) 0.00 - 0.85 K/uL    Eos (Absolute) 0.22 0.00 - 0.51 K/uL    Baso (Absolute) 0.08 0.00 - 0.12 K/uL    Immature Granulocytes (abs) 0.23 (H) 0.00 - 0.11 K/uL    NRBC (Absolute) 0.35 K/uL   Comp Metabolic Panel   Result Value Ref Range    Sodium 139 135 - 145 mmol/L    Potassium 4.3 3.6 - 5.5 mmol/L    Chloride 107 96 - 112 mmol/L    Co2 23 20 - 33 mmol/L    Anion Gap 9.0 7.0 - 16.0    Glucose 90 65 -  99 mg/dL    Bun 10 8 - 22 mg/dL    Creatinine 0.81 0.50 - 1.40 mg/dL    Calcium 8.9 8.4 - 10.2 mg/dL    AST(SGOT) 34 12 - 45 U/L    ALT(SGPT) 27 2 - 50 U/L    Alkaline Phosphatase 75 30 - 99 U/L    Total Bilirubin 1.4 0.1 - 1.5 mg/dL    Albumin 4.2 3.2 - 4.9 g/dL    Total Protein 7.5 6.0 - 8.2 g/dL    Globulin 3.3 1.9 - 3.5 g/dL    A-G Ratio 1.3 g/dL   RETICULOCYTES COUNT   Result Value Ref Range    Reticulocyte Count 13.9 (H) 0.8 - 2.1 %    Retic, Absolute 0.34 (H) 0.04 - 0.06 M/uL    Imm. Reticulocyte Fraction 25.6 (H) 9.3 - 17.4 %    Retic Hgb Equivalent 31.3 29.0 - 35.0 pg/cell   DIFFERENTIAL COMMENT   Result Value Ref Range    Comments-Diff see below    URINALYSIS    Specimen: Urine   Result Value Ref Range    Color Yellow     Character Cloudy (A)     Specific Gravity 1.020 <1.035    Ph 6.0 5.0 - 8.0    Glucose Negative Negative mg/dL    Ketones Trace (A) Negative mg/dL    Protein Negative Negative mg/dL    Bilirubin Negative Negative    Nitrite Negative Negative    Leukocyte Esterase Small (A) Negative    Occult Blood Large (A) Negative    Micro Urine Req Microscopic    CORRECTED CALCIUM   Result Value Ref Range    Correct Calcium 8.7 8.5 - 10.5 mg/dL   ESTIMATED GFR   Result Value Ref Range    GFR (CKD-EPI) 106 >60 mL/min/1.73 m 2   COV-2, FLU A/B, AND RSV BY PCR (2-4 HOURS CEPHEID): Collect NP swab in VTM    Specimen: Mid-Turbinate; Respirate   Result Value Ref Range    Influenza virus A RNA Negative Negative    Influenza virus B, PCR Negative Negative    RSV, PCR Negative Negative    SARS-CoV-2 by PCR NotDetected     SARS-CoV-2 Source NP Swab    PROCALCITONIN   Result Value Ref Range    Procalcitonin 0.14 <0.25 ng/mL   LACTIC ACID   Result Value Ref Range    Lactic Acid 0.6 0.5 - 2.0 mmol/L   URINE MICROSCOPIC (W/UA)   Result Value Ref Range    WBC 5-10 (A) /hpf    RBC 10-20 (A) /hpf    Bacteria Few (A) None /hpf    Epithelial Cells Few Few /hpf       Medications   fluticasone (FLOVENT HFA) 44 MCG/ACT  inhaler 88 mcg (has no administration in time range)   folic acid (FOLVITE) tablet 1 mg (has no administration in time range)   HYDROcodone-acetaminophen (NORCO) 5-325 MG per tablet 1 Tablet (has no administration in time range)   HYDROcodone/acetaminophen (NORCO)  MG per tablet 1 Tablet (1 Tablet Oral Given 12/14/22 0700)   loratadine (CLARITIN) tablet 10 mg (has no administration in time range)   acetaminophen (Tylenol) tablet 650 mg (has no administration in time range)   ondansetron (ZOFRAN) syringe/vial injection 4 mg (has no administration in time range)   ondansetron (ZOFRAN ODT) dispertab 4 mg (has no administration in time range)   promethazine (PHENERGAN) tablet 12.5-25 mg (has no administration in time range)   promethazine (PHENERGAN) suppository 12.5-25 mg (has no administration in time range)   prochlorperazine (COMPAZINE) injection 5-10 mg (has no administration in time range)   senna-docusate (PERICOLACE or SENOKOT S) 8.6-50 MG per tablet 2 Tablet (has no administration in time range)     And   polyethylene glycol/lytes (MIRALAX) PACKET 1 Packet (has no administration in time range)     And   magnesium hydroxide (MILK OF MAGNESIA) suspension 30 mL (has no administration in time range)     And   bisacodyl (DULCOLAX) suppository 10 mg (has no administration in time range)   HYDROmorphone (Dilaudid) injection 0.5 mg (has no administration in time range)   HYDROmorphone (Dilaudid) injection 1 mg (1 mg Intravenous Given 12/14/22 0230)   ondansetron (ZOFRAN) syringe/vial injection 4 mg (4 mg Intravenous Given 12/14/22 0230)   NS (BOLUS) infusion 1,000 mL (1,000 mL Intravenous New Bag 12/14/22 0230)   ketorolac (TORADOL) injection 15 mg (15 mg Intravenous Given 12/14/22 0230)   HYDROmorphone (Dilaudid) injection 1 mg (1 mg Intravenous Given 12/14/22 0430)       Measures:  HYDRATION: Based on the patient's presentation of Other sickle cell crisis the patient was given IV fluids. IV Hydration was used  because oral hydration was not adequate alone. Upon recheck following hydration, the patient was improved.    COURSE & MEDICAL DECISION MAKING    22 y.o. female with history of sickle cell disease presents with lower back pain and concern for pain crisis. The patient is hemodynamically stable on arrival, and afebrile. Their exam is reassuring, they are nontoxic-appearing, without evidence of acute chest syndrome, no organomegaly, no clear signs of infection. I have low concern for acute chest syndrome, aplastic crisis, avascular necrosis, osteomyelitis, splenic sequestration or other concerning acute presentations of sickle cell disease.  Considered alternate etiologies of this patient’s pain to include fracture, MSK pain, infection/abscess, and other ischemic etiologies but these seem unlikely.     Patient's labs resulted notable for leukocytosis of 18,000, anemia of 7.9.  Normal electrolytes and normal renal function.  CXR without acute cardiopulmonary process.    I treated the patient with Dilaudid, Benadryl, toradol and fluids and on reevaluation she had significant improvement in her pain.  I did recommend admission to the hospital or after receiving her lab tests given evidence of sickle cell crisis.  I am also concerned about her leukocytosis though she continued to deny any infectious symptoms.  Patient initially thought that she preferred to go home.  She was observed in the ER for several hours.  She did require repeat dosing of IV Dilaudid and did have brief episode of hypoxemia requiring 1L NC. She ultimately the patient was agreeable to admission.  Urine later resulted positive for urinary tract infection however patient had been admitted by this time and therefore will defer antibiotics to inpatient team.      FINAL IMPRESSION  1. Sickle cell pain crisis (HCC) Acute       2. Leukocytosis, unspecified type Acute       3. Anemia, unspecified type Acute       4. Cystitis Acute               This dictation  was created using voice recognition software. The accuracy of the dictation is limited to the abilities of the software.  The nursing notes were reviewed and certain aspects of this information were incorporated into this note.      Electronically signed by: Dulce Maria Duenas M.D., 12/14/2022 2:06 AM

## 2022-12-14 NOTE — ASSESSMENT & PLAN NOTE
Patient's crisis at this point has not resolved  Fluid administration continues and the patient at this point is improved on her hydration status  Patient at this point is trending down hemoglobin we are monitoring that, discussed with hematology and at this point recommendation is for her not to be transfuse until hemoglobin is below 6.5  Continue with pain management using Dilaudid PCA  Patient at this point is very frustrated with her situation and does not want to be in the hospital.  She has threatened multiple times to leave AGAINST MEDICAL ADVICE.  I have met with her and her mom at bedside repeatedly to explain the necessity of her staying in the hospital and getting treatment.  Patient tells me that she has something important tomorrow that she cannot miss but he refuses to disclose any further information on this.  The patient most likely will leave AGAINST MEDICAL ADVICE.  Currently she is alert awake oriented x3 and she is competent to make medical decisions.

## 2022-12-14 NOTE — H&P
Hospital Medicine History & Physical Note    Date of Service  12/14/2022    Primary Care Physician  Cullen Castro M.D.    Consultants  None    Code Status  Full Code    Chief Complaint  Chief Complaint   Patient presents with    Back Pain     Pt BIB [facility] with low back pain that radiates from low back towards buttox. Pt reports having this pain for a few days. Pt reports pain worsening today and upon waking up was in 10/10 pain at home. Pt tried ibuprofen at home as well as hydrocodone with no relief. Pt was given 3 mg of morphine PTA. Pt has a hx of sickle cell anemia.        History of Presenting Illness  Henry Anand is a 22 y.o. female, with history of asthma and sickle cell who presented to the emergency department on 12/14/2022 with complaints of back pain, hip pain overall not feeling well.  She described having a 10/10 pain, sharp in nature, and she feels the pain is preventing her from walking well due to severity of it.  She try to take ibuprofen at home and hydrocodone that she has for this without significant improvement therefore came to the ER for further evaluation.  She received 15 mg of ketorolac, 2 mg of Dilaudid and still in pain even though improving therefore I was contacted for further evaluation.  Her initial hemoglobin is 7.9.  Patient reports she is on her period.        I discussed the plan of care with patient.    Review of Systems  Review of Systems   Constitutional:  Positive for malaise/fatigue. Negative for fever.   HENT:  Negative for congestion and sore throat.    Eyes:  Negative for blurred vision and double vision.   Respiratory:  Negative for cough and shortness of breath.    Cardiovascular:  Negative for chest pain and palpitations.   Gastrointestinal:  Negative for nausea and vomiting.   Genitourinary:  Negative for dysuria and urgency.   Musculoskeletal:  Positive for back pain, joint pain and myalgias. Negative for neck pain.   Skin:  Negative for itching  and rash.   Neurological:  Negative for dizziness, weakness and headaches.   Endo/Heme/Allergies:  Does not bruise/bleed easily.   Psychiatric/Behavioral:  Negative for depression. The patient does not have insomnia.      Past Medical History   has a past medical history of Asthma (2021), Gallstones with biliary obstruction (11/2014), Heart murmur, and Sickle cell anemia without crisis (HCC) (2001).    Surgical History   has a past surgical history that includes cholecystectomy (11/3/2014); ercp (N/A, 10/30/2014); other (2018); septoturbinoplasty (Bilateral, 5/4/2021); antrostomy (5/4/2021); and ethmoidectomy, endoscopic (5/4/2021).     Family History  family history includes Anemia in her sister; Sleep Apnea in her brother, father, and mother; Stroke in her sister.   Family history reviewed with patient. There is no family history that is pertinent to the chief complaint.     Social History   reports that she has never smoked. She has never used smokeless tobacco. She reports that she does not currently use alcohol. She reports current drug use. Drug: Inhaled.    Allergies  Allergies   Allergen Reactions    Haloperidol Unspecified     Received Haldol x 2 doses in [facility] and developed an acute dystonic reaction, treated with diphenhydramine       Medications  Prior to Admission Medications   Prescriptions Last Dose Informant Patient Reported? Taking?   Cholecalciferol (VITAMIN D3) 125 MCG (5000 UT) Cap   Yes No   Sig: Take 1 Capsule by mouth every day.   HYDROcodone-acetaminophen (NORCO) 5-325 MG Tab per tablet   Yes No   Sig: Take 1 Tablet by mouth every 6 hours as needed.   albuterol 108 (90 Base) MCG/ACT Aero Soln inhalation aerosol   No No   Sig: Inhale 2 Puffs every 6 hours as needed for Shortness of Breath.   diphenhydrAMINE (BENADRYL) 25 MG Tab   No No   Sig: Take 1 Tablet by mouth at bedtime as needed (allergies).   fluticasone (FLOVENT HFA) 44 MCG/ACT Aerosol   No No   Sig: Inhale 2 Puffs every  day.   folic acid (FOLVITE) 1 MG Tab  Patient No No   Sig: Take 1 Tab by mouth every day.   hydroxyurea (HYDREA) 500 MG Cap  Patient Yes No   Sig: Take 1,500 mg by mouth every bedtime.   loratadine (CLARITIN) 10 MG Tab   No No   Sig: Take 1 Tablet by mouth every day.   penicillin v potassium (VEETID) 250 MG Tab  Patient No No   Sig: Take 1 Tab by mouth 2 times a day.   Patient taking differently: Take 250 mg by mouth 2 times a day. Maintenance  dose      Facility-Administered Medications: None       Physical Exam  Temp:  [37 °C (98.6 °F)] 37 °C (98.6 °F)  Pulse:  [66-81] 66  Resp:  [13-18] 13  BP: (114-125)/(48-62) 114/48  SpO2:  [88 %-98 %] 95 %  Blood Pressure: 114/48   Temperature: 37 °C (98.6 °F)   Pulse: 66   Respiration: 13   Pulse Oximetry: 95 %       Physical Exam  Constitutional:       Appearance: Normal appearance.   HENT:      Head: Normocephalic and atraumatic.      Mouth/Throat:      Mouth: Mucous membranes are moist.      Pharynx: Oropharynx is clear.   Eyes:      Extraocular Movements: Extraocular movements intact.      Pupils: Pupils are equal, round, and reactive to light.   Cardiovascular:      Rate and Rhythm: Normal rate and regular rhythm.      Heart sounds: Normal heart sounds.   Pulmonary:      Effort: Pulmonary effort is normal.      Breath sounds: Normal breath sounds.   Abdominal:      General: Abdomen is flat. Bowel sounds are normal.      Palpations: Abdomen is soft.   Musculoskeletal:      Cervical back: Normal range of motion and neck supple.   Skin:     General: Skin is warm and dry.   Neurological:      General: No focal deficit present.      Mental Status: She is alert and oriented to person, place, and time.   Psychiatric:         Mood and Affect: Mood normal.         Behavior: Behavior normal.       Laboratory:  Recent Labs     12/14/22  0219   WBC 18.4*   RBC 2.42*   HEMOGLOBIN 7.9*   HEMATOCRIT 22.2*   MCV 91.7   MCH 32.6   MCHC 35.6*   RDW 64.1*   PLATELETCT 301   MPV 9.9      Recent Labs     12/14/22 0219   SODIUM 139   POTASSIUM 4.3   CHLORIDE 107   CO2 23   GLUCOSE 90   BUN 10   CREATININE 0.81   CALCIUM 8.9     Recent Labs     12/14/22 0219   ALTSGPT 27   ASTSGOT 34   ALKPHOSPHAT 75   TBILIRUBIN 1.4   GLUCOSE 90         No results for input(s): NTPROBNP in the last 72 hours.      No results for input(s): TROPONINT in the last 72 hours.    Imaging:  DX-CHEST-PORTABLE (1 VIEW)   Final Result         1.  No acute cardiopulmonary disease.              Assessment/Plan:  Justification for Admission Status  I anticipate this patient will require at least two midnights for appropriate medical management, necessitating inpatient admission because 22-year-old female, sickle cell crisis.  Rule out sepsis.        * Sickle cell crisis (HCC)- (present on admission)  Assessment & Plan  -Inpatient status to medical floor.  -Patient with history of sickle cell anemia that is now on her period with lower hemoglobin and presenting with severe back pain and hip pain.  -She takes oxycodone at home and this has been ineffective therapy for her pain.  -She is receiving multiple doses of Dilaudid and ketorolac in the emergency department.  -I will increase her oxycodone and give breakthrough pain IV medication.  -We will need to monitor her hemoglobin.    Normocytic anemia  Assessment & Plan  -Hemoglobin today is 7.9.  She states she is on her period but is not noticing any increasing bleeding.  -Monitor CBC in the morning.  Also follows with hematology outpatient.      Leukocytosis  Assessment & Plan  -WBC 18.4.  She does not have any other sirs criteria.  Unsure if this is related to above.  She does not have any abnormalities on her chest x-ray.  Adding sepsis work-up including UA, procalcitonin, viral panel and will cover with antibiotics.    Mild persistent asthma without complication- (present on admission)  Assessment & Plan  -Without wheezing.    Cannabis abuse- (present on  admission)  Assessment & Plan  -Provided counseling.  Patient is not interested in quitting marijuana at this time.    Anxiety associated with depression- (present on admission)  Assessment & Plan  -Without symptoms at this time.  Will monitor.      VTE prophylaxis: SCDs/TEDs

## 2022-12-14 NOTE — ED NOTES
Pt requesting to speak w/ hospitalist; voalted Dr. Rose; pt extremely upset regarding her care; ER manager aware.

## 2022-12-14 NOTE — ED NOTES
Attempted to call report.  Nurse and Charge Nurse are unable to take my call at this time and the Nurse will call me back.

## 2022-12-14 NOTE — ED NOTES
Report received and care assumed.  Apparently pt would like to speak to the physician - physician has been texted by BRITTANY Larson.  Awaiting response.

## 2022-12-15 VITALS
DIASTOLIC BLOOD PRESSURE: 72 MMHG | OXYGEN SATURATION: 91 % | HEART RATE: 127 BPM | HEIGHT: 62 IN | WEIGHT: 134.26 LBS | BODY MASS INDEX: 24.71 KG/M2 | RESPIRATION RATE: 16 BRPM | TEMPERATURE: 99.9 F | SYSTOLIC BLOOD PRESSURE: 112 MMHG

## 2022-12-15 LAB
ABO GROUP BLD: NORMAL
ANION GAP SERPL CALC-SCNC: 9 MMOL/L (ref 7–16)
ANISOCYTOSIS BLD QL SMEAR: ABNORMAL
BLD GP AB SCN SERPL QL: NORMAL
BUN SERPL-MCNC: 5 MG/DL (ref 8–22)
CALCIUM SERPL-MCNC: 8.4 MG/DL (ref 8.4–10.2)
CHLORIDE SERPL-SCNC: 106 MMOL/L (ref 96–112)
CO2 SERPL-SCNC: 20 MMOL/L (ref 20–33)
CREAT SERPL-MCNC: 0.4 MG/DL (ref 0.5–1.4)
ERYTHROCYTE [DISTWIDTH] IN BLOOD BY AUTOMATED COUNT: 69.4 FL (ref 35.9–50)
ERYTHROCYTE [DISTWIDTH] IN BLOOD BY AUTOMATED COUNT: 72.1 FL (ref 35.9–50)
ERYTHROCYTE [DISTWIDTH] IN BLOOD BY AUTOMATED COUNT: 72.2 FL (ref 35.9–50)
GFR SERPLBLD CREATININE-BSD FMLA CKD-EPI: 144 ML/MIN/1.73 M 2
GLUCOSE SERPL-MCNC: 99 MG/DL (ref 65–99)
HCT VFR BLD AUTO: 17.8 % (ref 37–47)
HCT VFR BLD AUTO: 18.5 % (ref 37–47)
HCT VFR BLD AUTO: 18.6 % (ref 37–47)
HCT VFR BLD AUTO: 20 % (ref 37–47)
HGB BLD-MCNC: 6.4 G/DL (ref 12–16)
HGB BLD-MCNC: 6.8 G/DL (ref 12–16)
HGB BLD-MCNC: 6.8 G/DL (ref 12–16)
HGB BLD-MCNC: 7.1 G/DL (ref 12–16)
HGB RETIC QN AUTO: 30 PG/CELL (ref 29–35)
HGB RETIC QN AUTO: 30.3 PG/CELL (ref 29–35)
HYPOCHROMIA BLD QL SMEAR: ABNORMAL
IMM RETICS NFR: 19.1 % (ref 9.3–17.4)
IMM RETICS NFR: 21.7 % (ref 9.3–17.4)
MCH RBC QN AUTO: 31.6 PG (ref 27–33)
MCH RBC QN AUTO: 31.8 PG (ref 27–33)
MCH RBC QN AUTO: 32.1 PG (ref 27–33)
MCHC RBC AUTO-ENTMCNC: 35.5 G/DL (ref 33.6–35)
MCHC RBC AUTO-ENTMCNC: 36 G/DL (ref 33.6–35)
MCHC RBC AUTO-ENTMCNC: 36.6 G/DL (ref 33.6–35)
MCV RBC AUTO: 87.7 FL (ref 81.4–97.8)
MCV RBC AUTO: 88.6 FL (ref 81.4–97.8)
MCV RBC AUTO: 88.9 FL (ref 81.4–97.8)
MICROCYTES BLD QL SMEAR: ABNORMAL
OVALOCYTES BLD QL SMEAR: ABNORMAL
PLATELET # BLD AUTO: 247 K/UL (ref 164–446)
PLATELET # BLD AUTO: 262 K/UL (ref 164–446)
PLATELET # BLD AUTO: 288 K/UL (ref 164–446)
PLATELET BLD QL SMEAR: NORMAL
PMV BLD AUTO: 9 FL (ref 9–12.9)
PMV BLD AUTO: 9.1 FL (ref 9–12.9)
PMV BLD AUTO: 9.5 FL (ref 9–12.9)
POIKILOCYTOSIS BLD QL SMEAR: ABNORMAL
POLYCHROMASIA BLD QL SMEAR: ABNORMAL
POTASSIUM SERPL-SCNC: 4.2 MMOL/L (ref 3.6–5.5)
RBC # BLD AUTO: 2.01 M/UL (ref 4.2–5.4)
RBC # BLD AUTO: 2.12 M/UL (ref 4.2–5.4)
RBC # BLD AUTO: 2.25 M/UL (ref 4.2–5.4)
RBC BLD AUTO: PRESENT
RETICS # AUTO: 0.43 M/UL (ref 0.04–0.06)
RETICS # AUTO: 0.45 M/UL (ref 0.04–0.06)
RETICS/RBC NFR: 20.9 % (ref 0.8–2.1)
RETICS/RBC NFR: 24.8 % (ref 0.8–2.1)
RH BLD: NORMAL
SCHISTOCYTES BLD QL SMEAR: ABNORMAL
SICKLE CELLS BLD QL SMEAR: ABNORMAL
SODIUM SERPL-SCNC: 135 MMOL/L (ref 135–145)
SPHEROCYTES BLD QL SMEAR: ABNORMAL
TARGETS BLD QL SMEAR: ABNORMAL
WBC # BLD AUTO: 18.4 K/UL (ref 4.8–10.8)
WBC # BLD AUTO: 19 K/UL (ref 4.8–10.8)
WBC # BLD AUTO: 19.7 K/UL (ref 4.8–10.8)

## 2022-12-15 PROCEDURE — 94760 N-INVAS EAR/PLS OXIMETRY 1: CPT

## 2022-12-15 PROCEDURE — 99356 PR PROLONGED SVC I/P OR OBS SETTING 1ST HOUR: CPT | Performed by: HOSPITALIST

## 2022-12-15 PROCEDURE — 85046 RETICYTE/HGB CONCENTRATE: CPT | Mod: 91

## 2022-12-15 PROCEDURE — 86901 BLOOD TYPING SEROLOGIC RH(D): CPT

## 2022-12-15 PROCEDURE — 700102 HCHG RX REV CODE 250 W/ 637 OVERRIDE(OP): Performed by: HOSPITALIST

## 2022-12-15 PROCEDURE — A9270 NON-COVERED ITEM OR SERVICE: HCPCS | Performed by: HOSPITALIST

## 2022-12-15 PROCEDURE — 700105 HCHG RX REV CODE 258: Performed by: HOSPITALIST

## 2022-12-15 PROCEDURE — 36415 COLL VENOUS BLD VENIPUNCTURE: CPT

## 2022-12-15 PROCEDURE — 86850 RBC ANTIBODY SCREEN: CPT

## 2022-12-15 PROCEDURE — 85014 HEMATOCRIT: CPT

## 2022-12-15 PROCEDURE — 86900 BLOOD TYPING SEROLOGIC ABO: CPT

## 2022-12-15 PROCEDURE — 99233 SBSQ HOSP IP/OBS HIGH 50: CPT | Performed by: HOSPITALIST

## 2022-12-15 PROCEDURE — 770020 HCHG ROOM/CARE - TELE (206)

## 2022-12-15 PROCEDURE — 80048 BASIC METABOLIC PNL TOTAL CA: CPT

## 2022-12-15 PROCEDURE — 85027 COMPLETE CBC AUTOMATED: CPT | Mod: 91

## 2022-12-15 PROCEDURE — 85018 HEMOGLOBIN: CPT

## 2022-12-15 PROCEDURE — 94640 AIRWAY INHALATION TREATMENT: CPT

## 2022-12-15 PROCEDURE — 700111 HCHG RX REV CODE 636 W/ 250 OVERRIDE (IP): Performed by: HOSPITALIST

## 2022-12-15 RX ADMIN — CYCLOBENZAPRINE 10 MG: 10 TABLET, FILM COATED ORAL at 21:27

## 2022-12-15 RX ADMIN — LORATADINE 10 MG: 10 TABLET ORAL at 05:05

## 2022-12-15 RX ADMIN — SODIUM CHLORIDE 1000 ML: 9 INJECTION, SOLUTION INTRAVENOUS at 03:49

## 2022-12-15 RX ADMIN — CYCLOBENZAPRINE 10 MG: 10 TABLET, FILM COATED ORAL at 14:01

## 2022-12-15 RX ADMIN — HYDROXYUREA 1500 MG: 500 CAPSULE ORAL at 19:10

## 2022-12-15 RX ADMIN — ACETAMINOPHEN 650 MG: 325 TABLET, FILM COATED ORAL at 20:26

## 2022-12-15 RX ADMIN — Medication: at 19:45

## 2022-12-15 RX ADMIN — AZITHROMYCIN MONOHYDRATE 500 MG: 250 TABLET ORAL at 05:05

## 2022-12-15 RX ADMIN — CYCLOBENZAPRINE 10 MG: 10 TABLET, FILM COATED ORAL at 01:36

## 2022-12-15 RX ADMIN — ACETAMINOPHEN 650 MG: 325 TABLET, FILM COATED ORAL at 00:45

## 2022-12-15 RX ADMIN — CEFTRIAXONE SODIUM 1000 MG: 1 INJECTION, POWDER, FOR SOLUTION INTRAMUSCULAR; INTRAVENOUS at 05:08

## 2022-12-15 RX ADMIN — FOLIC ACID 1 MG: 1 TABLET ORAL at 05:05

## 2022-12-15 RX ADMIN — CYCLOBENZAPRINE 10 MG: 10 TABLET, FILM COATED ORAL at 08:25

## 2022-12-15 RX ADMIN — CYCLOBENZAPRINE 10 MG: 10 TABLET, FILM COATED ORAL at 04:09

## 2022-12-15 RX ADMIN — FLUTICASONE PROPIONATE 88 MCG: 44 AEROSOL, METERED RESPIRATORY (INHALATION) at 10:23

## 2022-12-15 RX ADMIN — Medication: at 03:49

## 2022-12-15 ASSESSMENT — ENCOUNTER SYMPTOMS
FEVER: 0
BRUISES/BLEEDS EASILY: 0
DEPRESSION: 0
FLANK PAIN: 0
EYE DISCHARGE: 0
EYES NEGATIVE: 1
SPUTUM PRODUCTION: 0
PHOTOPHOBIA: 0
STRIDOR: 0
DIZZINESS: 0
EYE REDNESS: 0
DIAPHORESIS: 0
WHEEZING: 0
BLOOD IN STOOL: 0
SENSORY CHANGE: 0
SHORTNESS OF BREATH: 0
ABDOMINAL PAIN: 0
NERVOUS/ANXIOUS: 1
BACK PAIN: 1
HEARTBURN: 0
GASTROINTESTINAL NEGATIVE: 1
CARDIOVASCULAR NEGATIVE: 1
POLYDIPSIA: 0
PND: 0
RESPIRATORY NEGATIVE: 1
CONSTITUTIONAL NEGATIVE: 1
MYALGIAS: 1
CHILLS: 0
DOUBLE VISION: 0
SINUS PAIN: 0
FALLS: 0
TINGLING: 0
INSOMNIA: 0
FOCAL WEAKNESS: 0
ORTHOPNEA: 0
COUGH: 0
SEIZURES: 0

## 2022-12-15 ASSESSMENT — PATIENT HEALTH QUESTIONNAIRE - PHQ9
SUM OF ALL RESPONSES TO PHQ9 QUESTIONS 1 AND 2: 0
2. FEELING DOWN, DEPRESSED, IRRITABLE, OR HOPELESS: NOT AT ALL
1. LITTLE INTEREST OR PLEASURE IN DOING THINGS: NOT AT ALL

## 2022-12-15 ASSESSMENT — LIFESTYLE VARIABLES: SUBSTANCE_ABUSE: 1

## 2022-12-15 ASSESSMENT — PAIN DESCRIPTION - PAIN TYPE
TYPE: ACUTE PAIN
TYPE: ACUTE PAIN

## 2022-12-15 ASSESSMENT — VISUAL ACUITY: OU: 1

## 2022-12-15 ASSESSMENT — FIBROSIS 4 INDEX: FIB4 SCORE: 0.54

## 2022-12-15 NOTE — PROGRESS NOTES
Received report from Dulce Maria SOTO. Pt AOx4 and reports 7/10 pain. Pt updated on Poc for the day. Pt has no further questions or needs at this time.

## 2022-12-15 NOTE — PROGRESS NOTES
"    I have been paged by patient nurse, and nursing manager as the patient is upset and wants to be discharged or moved to the emergency room.     I evaluated the patient at bedside.  The patient is still clinically dehydrated and getting intravenous fluids at a rate of 100/hour.  She is still having pain from sickle cell crisis and is on hydromorphone PCA pump.  She is tachycardic in the range of 100-110.  She has marked anxiety and has pallor.  Her most recent labs are remarkable for leukocytosis of 18.4 and a hemoglobin of 7.9.    A meeting has been arranged with the patient, patient mother, patient nurse, charge nurse and nursing management in addition to myself.  The patient expresses frustration for waiting too long \"an hour\" to get food and to get help to go to the bathroom.  She is also upset because her mother will not able to stay with her overnight because she is in a shared room.     I explained and emphasized that the patient is not medically cleared for discharge.  She will need more hydration and will need at least 1 follow-up CBC to ensure the patient does not need a blood transfusion as her last hemoglobin was 7.9 before getting hydration.     All patient and patient mother questions were answered.  Currently the mother is encouraging the patient to stay however the patient is still hesitant about staying and prefers to leave.  Patient and mother will be allowed to discuss their options before making the decision.   "

## 2022-12-15 NOTE — DISCHARGE INSTRUCTIONS
Discharge Instructions    Discharged to home by taxi with relative. Discharged via wheelchair, hospital escort: Yes.  Special equipment needed: Not Applicable    Be sure to schedule a follow-up appointment with your primary care doctor or any specialists as instructed.     Discharge Plan:   Diet Plan: Discussed  Activity Level: Discussed  Confirmed Follow up Appointment: Patient to Call and Schedule Appointment  Confirmed Symptoms Management: Discussed  Medication Reconciliation Updated: No (Comments)  Influenza Vaccine Indication: Not indicated: Previously immunized this influenza season and > 8 years of age    I understand that a diet low in cholesterol, fat, and sodium is recommended for good health. Unless I have been given specific instructions below for another diet, I accept this instruction as my diet prescription.   Other diet: regular    Special Instructions: None    -Is this patient being discharged with medication to prevent blood clots?  No    Is patient discharged on Warfarin / Coumadin?   No

## 2022-12-15 NOTE — PROGRESS NOTES
Hospital Medicine Daily Progress Note    Date of Service  12/15/2022    Chief Complaint  Henry Anand is a 22 y.o. female admitted 12/14/2022 with generalized muscle aches and backache    Hospital Course  12/14/2022-patient with known sickle cell disease has now entered into sickle cell crisis.  The patient has severe pain.  The patient is also hypotensive and dehydrated appearing.  The patient at this point will need adequate fluid resuscitation.  Patient will need also pain management.  Patient will need muscle relaxants and spasm management.  The patient will be given prophylactic antibiotics given her high white blood cell count to prevent chest syndrome.  12/15/2022-patient currently on a PCA with Dilaudid to control the pain.  Patient is receiving IV hydration and this at this point has caused a euvolemic state.  The patient's hemoglobin is trending down, hemoglobin is down to 6.8 on the most recent counts.  Discussion held with hematology at this point they recommend not to transfuse the patient to below 6.5.  Spoke with Dr. Gallego.  Patient's heart rate is still elevated at 120 and at this point this is a sign of pain.  Patient does have a on demand PCA but has not used the on-demand portion.  Patient states the pain is not well controlled however she falls asleep as soon as somebody is not actively engaging with her.  On repeated episodes today I went back into the room and she was sound asleep with no signs of discomfort.  When she is sleeping her heart rate is a lot lower as well.  The patient does seem to have excessive amounts of anxiety and this might be driving her heart rate as well.    Extensive discussion held with the patient regarding her current situation.  I have explained to the patient repeatedly that it is absolutely important that we give her a full treatment course and she not leave AGAINST MEDICAL ADVICE.  The patient threatens to leave AGAINST MEDICAL ADVICE repeatedly  as she says she has some personal issues she must attend to tomorrow that cannot be held off but she does not disclose any more information than that.    Interval Problem Update  Continue IV hydration  Continue with PCA for pain management  Continue monitoring hemoglobin hematocrit and reticulocyte count every 6 hours  If hemoglobin drops below 6.5 we will need to transfuse her 1 unit of packed red blood cells  Platelets slightly low at this point we will continue to monitor platelet counts  Regular nutritional support  Currently patient is mentally competent to make decisions  Spoke with mom several times today discussing the situation as the patient does not seem to be listening to her or any medical treatment staff member either.  The patient at this point is very set on making her own decisions.    I have discussed this patient's plan of care and discharge plan at IDT rounds today with Case Management, Nursing, Nursing leadership, and other members of the IDT team.    Consultants/Specialty  None    Code Status  Full Code    Disposition  Patient is not medically cleared for discharge.   Anticipate discharge to to home with close outpatient follow-up.  I have placed the appropriate orders for post-discharge needs.    Review of Systems  Review of Systems   Constitutional: Negative.  Negative for chills, diaphoresis and fever.   HENT: Negative.  Negative for nosebleeds and sinus pain.    Eyes: Negative.  Negative for double vision, photophobia, discharge and redness.   Respiratory: Negative.  Negative for cough, sputum production, shortness of breath, wheezing and stridor.    Cardiovascular: Negative.  Negative for chest pain, orthopnea, leg swelling and PND.   Gastrointestinal: Negative.  Negative for abdominal pain, blood in stool and heartburn.   Genitourinary: Negative.  Negative for dysuria, flank pain and frequency.   Musculoskeletal:  Positive for back pain, joint pain and myalgias. Negative for falls.    Skin: Negative.  Negative for itching.   Neurological:  Negative for dizziness, tingling, sensory change, focal weakness and seizures.   Endo/Heme/Allergies: Negative.  Negative for polydipsia. Does not bruise/bleed easily.   Psychiatric/Behavioral:  Positive for substance abuse (Cannabis). Negative for depression and suicidal ideas. The patient is nervous/anxious. The patient does not have insomnia.    All other systems reviewed and are negative.     Physical Exam  Temp:  [36.8 °C (98.2 °F)-38.3 °C (101 °F)] 37.6 °C (99.6 °F)  Pulse:  [] 89  Resp:  [12-20] 18  BP: (106-114)/(33-64) 113/64  SpO2:  [83 %-94 %] 91 %    Physical Exam  Vitals and nursing note reviewed. Exam conducted with a chaperone present.   Constitutional:       General: She is awake.      Appearance: Normal appearance. She is well-developed, well-groomed and normal weight. She is not ill-appearing.   HENT:      Head: Normocephalic and atraumatic.      Jaw: There is normal jaw occlusion. No trismus.      Salivary Glands: Right salivary gland is not tender. Left salivary gland is not tender.      Right Ear: External ear normal.      Left Ear: External ear normal.      Nose: Nose normal.      Mouth/Throat:      Mouth: Mucous membranes are moist.      Pharynx: Oropharynx is clear.   Eyes:      General: Lids are normal. Vision grossly intact. No visual field deficit.     Extraocular Movements: Extraocular movements intact.      Conjunctiva/sclera: Conjunctivae normal.      Right eye: Right conjunctiva is not injected. No exudate.     Left eye: Left conjunctiva is not injected. No exudate.     Pupils: Pupils are equal, round, and reactive to light.   Neck:      Thyroid: No thyroid mass.      Vascular: No hepatojugular reflux or JVD.      Trachea: No abnormal tracheal secretions or tracheal deviation.   Cardiovascular:      Rate and Rhythm: Normal rate and regular rhythm. Occasional Extrasystoles are present.     Pulses: Normal pulses.      Heart  sounds: Normal heart sounds.   Pulmonary:      Effort: Pulmonary effort is normal.      Breath sounds: Normal breath sounds.   Abdominal:      General: Abdomen is flat.      Palpations: Abdomen is soft.      Tenderness: There is no abdominal tenderness. There is no right CVA tenderness or left CVA tenderness.      Hernia: No hernia is present.   Musculoskeletal:      Cervical back: Full passive range of motion without pain and normal range of motion.      Thoracic back: Tenderness present. Decreased range of motion.      Lumbar back: Tenderness present. Decreased range of motion.      Right lower leg: No edema.      Left lower leg: No edema.   Lymphadenopathy:      Head:      Right side of head: No submental adenopathy.      Left side of head: No submental adenopathy.      Cervical:      Right cervical: No superficial cervical adenopathy.     Left cervical: No superficial cervical adenopathy.      Upper Body:      Right upper body: No supraclavicular adenopathy.      Left upper body: No supraclavicular adenopathy.   Skin:     General: Skin is warm and moist.      Capillary Refill: Capillary refill takes less than 2 seconds.      Coloration: Skin is not cyanotic.      Findings: No abrasion.   Neurological:      General: No focal deficit present.      Mental Status: She is alert and oriented to person, place, and time. Mental status is at baseline.      GCS: GCS eye subscore is 4. GCS verbal subscore is 5. GCS motor subscore is 6.      Cranial Nerves: No cranial nerve deficit, dysarthria or facial asymmetry.      Motor: No weakness, tremor, atrophy, abnormal muscle tone, seizure activity or pronator drift.      Coordination: Coordination is intact.   Psychiatric:         Attention and Perception: Attention and perception normal.         Mood and Affect: Mood is anxious. Affect is angry.         Speech: Speech is rapid and pressured.         Behavior: Behavior is agitated. Behavior is cooperative.         Thought  Content: Thought content normal.         Cognition and Memory: Cognition and memory normal.         Judgment: Judgment is impulsive.       Fluids    Intake/Output Summary (Last 24 hours) at 12/15/2022 1320  Last data filed at 12/15/2022 0908  Gross per 24 hour   Intake 2493.16 ml   Output --   Net 2493.16 ml       Laboratory  Recent Labs     12/14/22  0219 12/14/22  2153 12/15/22  0420 12/15/22  1051   WBC 18.4* 18.1* 18.4*  --    RBC 2.42* 2.25* 2.25*  --    HEMOGLOBIN 7.9* 7.2* 7.1* 6.8*   HEMATOCRIT 22.2* 20.0* 20.0* 18.5*   MCV 91.7 88.9 88.9  --    MCH 32.6 32.0 31.6  --    MCHC 35.6* 36.0* 35.5*  --    RDW 64.1* 70.2* 69.4*  --    PLATELETCT 301 269 288  --    MPV 9.9 9.0 9.5  --      Recent Labs     12/14/22  0219 12/14/22  2153 12/15/22  0420   SODIUM 139 131* 135   POTASSIUM 4.3 4.0 4.2   CHLORIDE 107 103 106   CO2 23 20 20   GLUCOSE 90 96 99   BUN 10 5* 5*   CREATININE 0.81 0.43* 0.40*   CALCIUM 8.9 8.3* 8.4                   Imaging  DX-CHEST-PORTABLE (1 VIEW)   Final Result         1.  No acute cardiopulmonary disease.           Assessment/Plan  * Sickle cell crisis (HCC)- (present on admission)  Assessment & Plan  Patient's crisis at this point has not resolved  Fluid administration continues and the patient at this point is improved on her hydration status  Patient at this point is trending down hemoglobin we are monitoring that, discussed with hematology and at this point recommendation is for her not to be transfuse until hemoglobin is below 6.5  Continue with pain management using Dilaudid PCA  Patient at this point is very frustrated with her situation and does not want to be in the hospital.  She has threatened multiple times to leave AGAINST MEDICAL ADVICE.  I have met with her and her mom at bedside repeatedly to explain the necessity of her staying in the hospital and getting treatment.  Patient tells me that she has something important tomorrow that she cannot miss but he refuses to disclose  any further information on this.  The patient most likely will leave AGAINST MEDICAL ADVICE.  Currently she is alert awake oriented x3 and she is competent to make medical decisions.    Normocytic anemia  Assessment & Plan  Hemoglobin trending down with sickle cell crisis most recently 6.8.  Reticulocyte count above 300  Every 6 hours H&H and reticulocyte count will be done  If hemoglobin drops below 6.5 we will transfuse her 1 unit of packed red blood cells      Leukocytosis  Assessment & Plan  Patient has a mild urinary tract infection  Possible development of pneumonia is present and thus the white blood cell count is most likely infectious in nature  Patient is on Rocephin azithromycin  Blood cultures urine cultures and sputum cultures at this point are pending    Mild persistent asthma without complication- (present on admission)  Assessment & Plan  Asymptomatic and lungs are clear    Cannabis abuse- (present on admission)  Assessment & Plan  Chronic cannabis use    Anxiety associated with depression- (present on admission)  Assessment & Plan  Patient has severe anxiety with depression in the pain at this point is exacerbating her situation.       VTE prophylaxis: SCDs/TEDs and therapeutic anticoagulation with Eliquis    I have performed a physical exam and reviewed and updated ROS and Plan today (12/15/2022). In review of yesterday's note (12/14/2022), there are no changes except as documented above.

## 2022-12-15 NOTE — PROGRESS NOTES
Telemetry Shift Summary    Rhythm ST  HR Range 103-126  Ectopy rPVC  Measurements .16/.10/.36        Normal Values  Rhythm SR  HR Range    Measurements 0.12-0.20 / 0.06-0.10  / 0.30-0.52

## 2022-12-15 NOTE — PROGRESS NOTES
4 Eyes Skin Assessment Completed by Evangelina FLORES RN and BRITTANY Taylor.    Head WDL  Ears WDL  Nose WDL  Mouth WDL  Neck WDL  Breast/Chest WDL  Shoulder Blades WDL  Spine WDL  (R) Arm/Elbow/Hand WDL  (L) Arm/Elbow/Hand WDL  Abdomen WDL  Groin WDL  Scrotum/Coccyx/Buttocks WDL  (R) Leg WDL  (L) Leg WDL  (R) Heel/Foot/Toe WDL  (L) Heel/Foot/Toe WDL          Devices In Places Tele Box and Nasal Cannula      Interventions In Place N/A    Possible Skin Injury No    Pictures Uploaded Into Epic N/A  Wound Consult Placed N/A  RN Wound Prevention Protocol Ordered No

## 2022-12-15 NOTE — FLOWSHEET NOTE
Telemetry Shift Summary     Rhythm: ST  HR Range: 90/110  Ectopy: n/a  Measurements: 0.16/0.10/0.36              Normal Values  Rhythm SR  HR Range    Measurements 0.12-0.20 / 0.06-0.10  / 0.30-0.52

## 2022-12-15 NOTE — DOCUMENTATION QUERY
[facility]                                                                       Query Response Note      PATIENT:               BIANCA LOVETT  ACCT #:                  [id number]  MRN:                     [id number]  :                      2000  ADMIT DATE:       2022 1:28 AM  DISCH DATE:          RESPONDING  PROVIDER #:        [id number]           QUERY TEXT:    Per ED provider note, patient has UTI, this documentation is not found elsewhere in the medical record.   Please clarify the clinical relevance for the clinical/diagnostic findings      The patient's clinical indicators include:  - Findings:  ED provider note: Urine later resulted positive for urinary tract infection, defer antibiotics to inpatient team     - UA per Epic:  Cloudy, yellow, trace ketones, large occult blood, negative nitrite, small leukocyte esterace, 5-10 WBC, few epithelial cells, few bacteria     - WBC 18.4,  blood cultures negative     - Treatments:  UA, cultures, antibiotics    - Risk factors:  sickle cell crisis, dehydration     Thank You,  Odilia Gonzales RN  Clinical Documentation   [email or web address]  Connect via [employer] Messenger  Options provided:   -- UTI ruled in   -- UTI ruled out   -- Other explanation, Please specify other explanation   -- Unable to determine      Query created by: Odilia Gonzales on 12/15/2022 10:03 AM    RESPONSE TEXT:    UTI ruled in          Electronically signed by:  ALISHA CALVERT MD 12/15/2022 12:29 PM

## 2022-12-15 NOTE — CARE PLAN
The patient is Stable - Low risk of patient condition declining or worsening    Shift Goals  Clinical Goals: Pain control  Patient Goals: Pain control and go home  Family Goals: Make sure pt pain is managed    Progress made toward(s) clinical / shift goals:    Problem: Discharge Barriers/Planning  Goal: Patient's continuum of care needs are met  Outcome: Progressing: pt given pain medication to reduce back pain     Problem: Mobility  Goal: Patient's capacity to carry out activities will improve  Outcome: Progressing: pt able to move with handheld assistance       Patient is not progressing towards the following goals:      Problem: Psychosocial  Goal: Patient's level of anxiety will decrease  Outcome: Not Progressing: pt still anxious about current hospital stay     Problem: Pain - Standard  Goal: Alleviation of pain or a reduction in pain to the patient’s comfort goal  Outcome: Not Progressing: pt unable to control pain with PCA medication

## 2022-12-15 NOTE — PROGRESS NOTES
Dr. Cisneros paged about pt voicing wanted to leave AMA again after trying to treat fever that was reported with 0400 vitals. Pt temperature retaken at 98.5 F per patient request. Pt requested to speak to doctor directly about pt pain would not specify further. Awaiting doctor to arrive at bedside at this time.

## 2022-12-15 NOTE — PROGRESS NOTES
"Paged Nurse supervisor at 2030 due to patient wanting to leave AMA due to her mother not being able to stay. Pt and Family were educated about hospital policy on visitors pt requested to speak to supervisor. Post discussion with Nurse supervisor pt request to speak to NAM and Doctor to be cleared for going home AMA. NAM and Doctor at  bedside explained to pt risks of going home and that she is unfit to discharge. Pt still voicing leaving AMA, mother voiced pt should stay. Pt educated on lab results at 2203 requested to speak with hospitalitis on call. Paged Dr. Cisneros as pt requested \"that a doctor should tell me not a third party.\" Dr. Cisneros at bedside at 2225. NAM paged to attempt to accommodate pt wishes for pt mother to stay. NAM received roommates consent to family being at bedside at night. Pt agreeable to stay at this time.   "

## 2022-12-15 NOTE — CARE PLAN
The patient is Stable - Low risk of patient condition declining or worsening    Shift Goals  Clinical Goals: Pain control  Patient Goals: Pain control and go home  Family Goals: Make sure pt pain is managed    Progress made toward(s) clinical / shift goals:    Problem: Discharge Barriers/Planning  Goal: Patient's continuum of care needs are met.  Outcome: Progressing: pt given PCA pump for pain     Problem: Mobility  Goal: Patient's capacity to carry out activities will improve  Outcome: Progressing: pt able to carry out ADLs with handheld assist       Patient is not progressing towards the following goals:  Problem: Psychosocial  Goal: Patient's level of anxiety will decrease    Outcome: Not Progressing: pt still anxious about current hospitalization     Problem: Pain - Standard  Goal: Alleviation of pain or a reduction in pain to the patient’s comfort goal  Outcome: Not Progressing: pt pain unable to decrease below 7/10

## 2022-12-16 PROCEDURE — 99238 HOSP IP/OBS DSCHRG MGMT 30/<: CPT | Performed by: HOSPITALIST

## 2022-12-16 NOTE — PROGRESS NOTES
Paged by nurse that patient is refusing treatment and only will except treatment if I talk to her.  Went back to see patient and mom at bedside.  Patient very adamant that I must contact her hematologist in [place] through the sickle cell center [facility] otherwise she refuses any blood transfusions no matter how low her hemoglobin goes.  Obtained phone number for sickle cell center [facility] in [place] as well as in [place].  Attempted to call phone number about 20 times with no avail.  The phone number at this point does go to an answering machine and then at that point routing goes into a continuous busy mode.  Obtained information on the web regarding the sickle cell center [facility] found a local address at [address]  I drove to the center to see if they would be able to help me and they were able to get in touch with the nurse who is actually taking care of the patient as an outpatient.  I had a extensive discussion with.nurse and she informs me that they have told the patient multiple times that she needs to stay for treatment.  They are recommending transfusion below 6 but 6.5 is acceptable since her heart rate is 120.  They have assured me that they will call the patient at that point in time and discuss with her the necessity for staying in the hospital receiving treatment and getting a transfusion below 6.5.  Received call back at 3:56 PM for Sandi from the hemostasis center telling me that she spoke to the patient as well as Dr. Hyde and everybody is on the same page the patient is to receive transfusion below 6.5.  The patient is to receive sickle poor Rh matched C and E and Angie negative blood.  Most recent hemoglobin at that point time was 6.8 and thus no blood transfusion at this point is indicated.

## 2022-12-16 NOTE — PROGRESS NOTES
Report received from Barbara SOTO. POC discussed. Pt sitting in bed. Family at bedside. Safety precautions in place.

## 2022-12-16 NOTE — PROGRESS NOTES
Received call from charge nurse as well as on call physician for the hospitalist group that the patient is demanding to leave AGAINST MEDICAL ADVICE but wants all her medications.  According to the patient she told the charge nurse that I promised them that they would get all medications if they left AGAINST MEDICAL ADVICE.  No such discussion was ever held.  The patient has been instructed multiple times that she should not leave AGAINST MEDICAL ADVICE as it is very dangerous and that this can cost her life.  The patient has told us multiple times she does not care and if the time comes she will leave AGAINST MEDICAL ADVICE as she has something personal to do that cannot wait.  At this point we are not going to be providing medications for pain for discharge.    Discussion held with treatment team and patient in excess of 60 minutes

## 2022-12-16 NOTE — CARE PLAN
The patient is Stable - Low risk of patient condition declining or worsening    Shift Goals  Clinical Goals: maintain hgb  Patient Goals: discharge  Family Goals: Make sure pt pain is managed    Progress made toward(s) clinical / shift goals:  Hgb drawn every 6 hrs. Pt on PCA for pain control. Care clustered to allow for rest. Pt educated about importance of remaining in the hospital for care.     Patient is not progressing towards the following goals:

## 2022-12-16 NOTE — DISCHARGE SUMMARY
Discharge Summary    CHIEF COMPLAINT ON ADMISSION  Chief Complaint   Patient presents with    Back Pain     Pt BIB [facility] with low back pain that radiates from low back towards buttox. Pt reports having this pain for a few days. Pt reports pain worsening today and upon waking up was in 10/10 pain at home. Pt tried ibuprofen at home as well as hydrocodone with no relief. Pt was given 3 mg of morphine PTA. Pt has a hx of sickle cell anemia.        Reason for Admission  EMS     Admission Date  12/14/2022    CODE STATUS  Full Code    HPI & HOSPITAL COURSE  This is a 22 y.o. female, with history of asthma and sickle cell anemia, was admitted on 12/14 for treatment of sickle cell crisis with severe pain, requiring PCA/Dilaudid for pain control she was initially hypotensive and dehydrated appearing therefore received IV fluid resuscitation.  She was a started on prophylactic antibiotics for elevated WBC.  Her initial hemoglobin was 7.2, progressively dropping.  Hospitalist consulted with Dr. Gallego who recommended transfusion only if hemoglobin less than 6.5.  Last night, hemoglobin was 6.4 and PRBC transfusion was ordered.  This require special blood (Hgb S negative, C negative, E negative, K negative now to avoid sensitization), and blood needs to come from [place].  Over the last 2 nights, patient has been threatening to leave AGAINST MEDICAL ADVICE for variety of reasons.  She states she has something very important to do this morning regarding her apartment and decided to leave AGAINST MEDICAL ADVICE.  Patient has capacity and understands risks and benefits of such decision including worsening of her condition, requiring prolonged hospitalization, worsening infection, bleeding/anemia and even death.        Therefore, she is discharged in guarded and stable condition to home with close outpatient follow-up.    The patient met 2-midnight criteria for an inpatient stay at the time of discharge. :  Left AGAINST MEDICAL  ADVICE    Discharge Date  12/16/2022    FOLLOW UP ITEMS POST DISCHARGE  Patient was encouraged to immediately return to the emergency department as soon as possible for continuation of her care and if not coming back to follow-up with her primary care as soon as possible    DISCHARGE DIAGNOSES  Principal Problem:    Sickle cell crisis (HCC) POA: Yes  Active Problems:    Leukocytosis POA: Unknown    Normocytic anemia POA: Unknown    Anxiety associated with depression POA: Yes    Cannabis abuse POA: Yes    Mild persistent asthma without complication POA: Yes  Resolved Problems:    * No resolved hospital problems. *      FOLLOW UP  Future Appointments   Date Time Provider Department Center   12/20/2022  8:40 AM Cullen Castro M.D. [facility]   2/1/2023  1:40 PM Cammie Duval M.D. [facility] None     Cullen Castro M.D.  [address]  [place] [address]  [phone]            MEDICATIONS ON DISCHARGE     Medication List        ASK your doctor about these medications        Instructions   albuterol 108 (90 Base) MCG/ACT Aers inhalation aerosol   Doctor's comments: Give albuterol that is patient or insurance preference please  Inhale 2 Puffs every 6 hours as needed for Shortness of Breath.  Dose: 2 Puff     diphenhydrAMINE 25 MG Tabs  Commonly known as: BENADRYL  Ask about: Which instructions should I use?   Take 25-50 mg by mouth every 6 hours as needed. Indications: Hayfever  Dose: 25-50 mg     Flovent HFA 44 MCG/ACT Aero  Generic drug: fluticasone  Ask about: Which instructions should I use?   Inhale 2 Puffs as needed (Per pt takes as needed for SOB).  Dose: 2 Puff     folic acid 1 MG Tabs  Commonly known as: FOLVITE   Take 1 Tab by mouth every day.  Dose: 1 mg     HYDROcodone-acetaminophen 5-325 MG Tabs per tablet  Commonly known as: NORCO   Take 1 Tablet by mouth every 6 hours as needed. Indications: Pain  Dose: 1 Tablet     hydroxyurea 500 MG Caps  Commonly known as: HYDREA   Take 1,500 mg by mouth every  day.  Dose: 1,500 mg     ibuprofen 200 MG Tabs  Commonly known as: MOTRIN   Take 200 mg by mouth every 6 hours as needed for Mild Pain.  Dose: 200 mg     loratadine 10 MG Tabs  Commonly known as: CLARITIN  Ask about: Which instructions should I use?   Take 10 mg by mouth as needed. Indications: Hayfever  Dose: 10 mg     penicillin v potassium 250 MG Tabs  Commonly known as: VEETID   Take 1 Tab by mouth 2 times a day.  Dose: 250 mg     vitamin D3 125 MCG (5000 UT) Caps   Take 5,000 Units by mouth every day.  Dose: 5,000 Units              Allergies  Allergies   Allergen Reactions    Haloperidol Unspecified     Received Haldol x 2 doses in [facility] and developed an acute dystonic reaction, treated with diphenhydramine       DIET  No orders of the defined types were placed in this encounter.      ACTIVITY  As tolerated.  Weight bearing as tolerated    CONSULTATIONS  None    PROCEDURES  None    LABORATORY  Lab Results   Component Value Date    SODIUM 135 12/15/2022    POTASSIUM 4.2 12/15/2022    CHLORIDE 106 12/15/2022    CO2 20 12/15/2022    GLUCOSE 99 12/15/2022    BUN 5 (L) 12/15/2022    CREATININE 0.40 (L) 12/15/2022        Lab Results   Component Value Date    WBC 19.7 (H) 12/15/2022    HEMOGLOBIN 6.4 (L) 12/15/2022    HEMATOCRIT 17.8 (LL) 12/15/2022    PLATELETCT 247 12/15/2022        Total time of the discharge process exceeds 25 minutes.

## 2022-12-16 NOTE — PROGRESS NOTES
Lab results have come back and thus I have reviewed the lab results, hemoglobin is down to 6.8.  I went back to discuss it with patient patient sleeping very deeply and thus I discussed the situation with the mom who was at bedside.  At this point we are not going to transfuse per recommendations of hematology.

## 2022-12-16 NOTE — PROGRESS NOTES
Henry Sales Laura Anand has chosen to leave the hospital against medical advice. The attending physician has not discharged the patient. The provider is aware that the patient is leaving against medical advice. Patient expresses understanding of the risks of leaving the hospital and benefits of admission including but not limited to, the availability and proximity of nurses, physicians, monitoring, diagnostic testing, treatment, and a safe discharge plan. The patient had the opportunity to ask questions about their medical condition and recommended treatment.  Patient is aware that they may return for care at any time.

## 2022-12-16 NOTE — PROGRESS NOTES
OVERNIGHT HOSPITALIST:    Multiple pagers about this patient this evening and discussions with her and her mother by the bedside.  Earlier on my shift, paged by nursing Staff. Hgb is 6.4 now. I called Blood bank and discussed this. Ordered PRBC Hgb S negative, C negative, E negative, K negative now to avoid sensitization.  Patient was very clear at that time that she will be leaving at 7 AM (AMA) as she has some important things he lives related to her apartment she has to resolve in the morning    Around 2:45 AM, we received a notification from blood bank that special blood will come from [place] therefore likely will take many hours.  When patient was informed about this, she decided to immediately leave AMA.  She was demanding to hold the unit of blood for her as she plan to come back later in the day for the blood transfusion.  She received extensive explanations about processes when leaving AGAINST MEDICAL ADVICE and understands that leaving will require her to start admission from the ER and blood transfusion will have to be once again requested and possibly delayed.  She also understands that there is a significant risk of worsening of her condition causing not only her to need additional units of blood, more pain medication, prolonged hospitalization but also put her life at risk for which she verbalizes understanding and her mother is with her by the bedside.    Patient left the hospital around 4 AM this morning.  She was told to return immediately to the hospital at any time if not feeling well.

## 2022-12-16 NOTE — PROGRESS NOTES
Pt requested removal of IV access. Pt stating she will likely leave AMA in 3 mins. Pt educated on plan of care and importance of her health. Dr. Cisneros updated.

## 2022-12-16 NOTE — PROGRESS NOTES
Patient wanting to leave all day as has something important to do tomorrow. Hbg drawn every six hours and last two have been 6.8 therefore does not warrant transfusion. Patient constantly changing her story and did not even remember that this nurse was her nurse all day. Patient wants more done about preventing hemoglobin from dropping and saying current treatment is not good enough. STANLEY Li and Dr. Cazares made aware. Awaiting plan.

## 2022-12-16 NOTE — PROGRESS NOTES
I have been paged by patient nurse, Evangelina Kong, informing me that the patient is under the impression that she is being discharged tonight however this has not been ordered, documented for dicussed throughout the day and in multidisplinary meetings.  I reviewed hospitalist and nursing progress notes.  There is no intention to discharge, in fact the patient lab work reflects a worsening condition.  The patient is intermittently febrile with temperature up to 101, she is tachycardic, her white count is increasing. WBC today is 19,000.  She is on intravenous antibiotics and intravenous fluids in addition to hydromorphone PCA.  I talked with patient attending, Dr Rose, and he confirms that the patient is not medically cleared for discharge. Also confirmed threshold for blood transfusion documented in Dr Rose's progress note.     I talked with patient nurse, and the patient over the phone, and explained that the patient is not medically cleared to be discharged at this point.

## 2022-12-16 NOTE — PROGRESS NOTES
STANLEY Moeller Paged post conversation with supervisor due to pt wanting to leave AMA. NAM at bedside at 2100. Post discussion NAM reported that he will get pt DISCHARGED home with one time dose medications, a prescription for Flexiril, and a cab voucher. Sajan educated pt and mother that if pt were to leave AGAINST MEDICAL ADVICE pt WOULD NOT receive any prescriptions, cab vouchers, or one time dose to go home with. They would leave with no resources. Both pt and mother verbalized understanding at this time. STANLEY paged Dr. Cazares to come speak to pt with him to educate pt on risks of leaving AGAINST MEDICAL ADVICE. Dr. Cazares at bedside at 2130 discuss pt condition. Awaiting new orders at this time.

## 2022-12-16 NOTE — PROGRESS NOTES
Lab called with critical result of HGB 6.4 at 2200. Critical lab result read back to Lab.   Dr. Cisneros notified of critical lab result at 2215.  Critical lab result read back by Dr. Cisneros. Dr. Cisneros to bedside with pt and new orders received.

## 2022-12-17 ENCOUNTER — HOSPITAL ENCOUNTER (INPATIENT)
Facility: MEDICAL CENTER | Age: 22
LOS: 1 days | DRG: 871 | End: 2022-12-18
Attending: STUDENT IN AN ORGANIZED HEALTH CARE EDUCATION/TRAINING PROGRAM | Admitting: HOSPITALIST
Payer: MEDICAID

## 2022-12-17 ENCOUNTER — APPOINTMENT (OUTPATIENT)
Dept: RADIOLOGY | Facility: MEDICAL CENTER | Age: 22
DRG: 871 | End: 2022-12-17
Attending: STUDENT IN AN ORGANIZED HEALTH CARE EDUCATION/TRAINING PROGRAM
Payer: MEDICAID

## 2022-12-17 DIAGNOSIS — D57.1 SICKLE CELL ANEMIA WITHOUT CRISIS (HCC): Chronic | ICD-10-CM

## 2022-12-17 PROBLEM — J18.9 SEPSIS DUE TO PNEUMONIA (HCC): Status: ACTIVE | Noted: 2022-12-17

## 2022-12-17 PROBLEM — A41.9 SEPSIS DUE TO PNEUMONIA (HCC): Status: ACTIVE | Noted: 2022-12-17

## 2022-12-17 PROBLEM — E87.6 HYPOKALEMIA: Status: ACTIVE | Noted: 2022-12-17

## 2022-12-17 LAB
ABO GROUP BLD: NORMAL
ALBUMIN SERPL BCP-MCNC: 3.6 G/DL (ref 3.2–4.9)
ALBUMIN/GLOB SERPL: 1.1 G/DL
ALP SERPL-CCNC: 94 U/L (ref 30–99)
ALT SERPL-CCNC: 20 U/L (ref 2–50)
ANION GAP SERPL CALC-SCNC: 13 MMOL/L (ref 7–16)
APPEARANCE UR: CLEAR
AST SERPL-CCNC: 30 U/L (ref 12–45)
BARCODED ABORH UBTYP: 9500
BARCODED ABORH UBTYP: 9500
BARCODED PRD CODE UBPRD: NORMAL
BARCODED PRD CODE UBPRD: NORMAL
BARCODED UNIT NUM UBUNT: NORMAL
BARCODED UNIT NUM UBUNT: NORMAL
BASOPHILS # BLD AUTO: 0.2 % (ref 0–1.8)
BASOPHILS # BLD: 0.03 K/UL (ref 0–0.12)
BILIRUB SERPL-MCNC: 2.5 MG/DL (ref 0.1–1.5)
BILIRUB UR QL STRIP.AUTO: NEGATIVE
BLD GP AB SCN SERPL QL: NORMAL
BUN SERPL-MCNC: 7 MG/DL (ref 8–22)
CALCIUM ALBUM COR SERPL-MCNC: 8.8 MG/DL (ref 8.5–10.5)
CALCIUM SERPL-MCNC: 8.5 MG/DL (ref 8.4–10.2)
CHLORIDE SERPL-SCNC: 103 MMOL/L (ref 96–112)
CO2 SERPL-SCNC: 22 MMOL/L (ref 20–33)
COLOR UR: ABNORMAL
COMPONENT R 8504R: NORMAL
COMPONENT R 8504R: NORMAL
CREAT SERPL-MCNC: 0.46 MG/DL (ref 0.5–1.4)
EOSINOPHIL # BLD AUTO: 0.2 K/UL (ref 0–0.51)
EOSINOPHIL NFR BLD: 1.3 % (ref 0–6.9)
ERYTHROCYTE [DISTWIDTH] IN BLOOD BY AUTOMATED COUNT: 59.7 FL (ref 35.9–50)
ERYTHROCYTE [DISTWIDTH] IN BLOOD BY AUTOMATED COUNT: 70.8 FL (ref 35.9–50)
GFR SERPLBLD CREATININE-BSD FMLA CKD-EPI: 139 ML/MIN/1.73 M 2
GLOBULIN SER CALC-MCNC: 3.2 G/DL (ref 1.9–3.5)
GLUCOSE SERPL-MCNC: 84 MG/DL (ref 65–99)
GLUCOSE UR STRIP.AUTO-MCNC: 100 MG/DL
HCG SERPL QL: NEGATIVE
HCT VFR BLD AUTO: 15.5 % (ref 37–47)
HCT VFR BLD AUTO: 22.5 % (ref 37–47)
HGB BLD-MCNC: 5.5 G/DL (ref 12–16)
HGB BLD-MCNC: 8 G/DL (ref 12–16)
HGB RETIC QN AUTO: 31.2 PG/CELL (ref 29–35)
IMM GRANULOCYTES # BLD AUTO: 0.09 K/UL (ref 0–0.11)
IMM GRANULOCYTES NFR BLD AUTO: 0.6 % (ref 0–0.9)
IMM RETICS NFR: 28.6 % (ref 9.3–17.4)
KETONES UR STRIP.AUTO-MCNC: ABNORMAL MG/DL
LACTATE SERPL-SCNC: 1.4 MMOL/L (ref 0.5–2)
LEUKOCYTE ESTERASE UR QL STRIP.AUTO: NEGATIVE
LYMPHOCYTES # BLD AUTO: 4.08 K/UL (ref 1–4.8)
LYMPHOCYTES NFR BLD: 26.1 % (ref 22–41)
MCH RBC QN AUTO: 30.8 PG (ref 27–33)
MCH RBC QN AUTO: 31.8 PG (ref 27–33)
MCHC RBC AUTO-ENTMCNC: 35.5 G/DL (ref 33.6–35)
MCHC RBC AUTO-ENTMCNC: 35.6 G/DL (ref 33.6–35)
MCV RBC AUTO: 86.5 FL (ref 81.4–97.8)
MCV RBC AUTO: 89.6 FL (ref 81.4–97.8)
MICRO URNS: ABNORMAL
MONOCYTES # BLD AUTO: 1.13 K/UL (ref 0–0.85)
MONOCYTES NFR BLD AUTO: 7.2 % (ref 0–13.4)
NEUTROPHILS # BLD AUTO: 10.12 K/UL (ref 2–7.15)
NEUTROPHILS NFR BLD: 64.6 % (ref 44–72)
NITRITE UR QL STRIP.AUTO: NEGATIVE
NRBC # BLD AUTO: 0.62 K/UL
NRBC BLD-RTO: 4 /100 WBC
PH UR STRIP.AUTO: 6.5 [PH] (ref 5–8)
PLATELET # BLD AUTO: 237 K/UL (ref 164–446)
PLATELET # BLD AUTO: 261 K/UL (ref 164–446)
PMV BLD AUTO: 8.9 FL (ref 9–12.9)
PMV BLD AUTO: 9.3 FL (ref 9–12.9)
POTASSIUM SERPL-SCNC: 3.4 MMOL/L (ref 3.6–5.5)
PROCALCITONIN SERPL-MCNC: 0.26 NG/ML
PRODUCT TYPE UPROD: NORMAL
PRODUCT TYPE UPROD: NORMAL
PROT SERPL-MCNC: 6.8 G/DL (ref 6–8.2)
PROT UR QL STRIP: NEGATIVE MG/DL
RBC # BLD AUTO: 1.73 M/UL (ref 4.2–5.4)
RBC # BLD AUTO: 2.6 M/UL (ref 4.2–5.4)
RBC UR QL AUTO: NEGATIVE
RETICS # AUTO: 0.39 M/UL (ref 0.04–0.06)
RETICS/RBC NFR: 22.5 % (ref 0.8–2.1)
RH BLD: NORMAL
SODIUM SERPL-SCNC: 138 MMOL/L (ref 135–145)
SP GR UR STRIP.AUTO: 1.01
UNIT STATUS USTAT: NORMAL
UNIT STATUS USTAT: NORMAL
WBC # BLD AUTO: 14.7 K/UL (ref 4.8–10.8)
WBC # BLD AUTO: 15.7 K/UL (ref 4.8–10.8)

## 2022-12-17 PROCEDURE — 700111 HCHG RX REV CODE 636 W/ 250 OVERRIDE (IP): Performed by: HOSPITALIST

## 2022-12-17 PROCEDURE — P9016 RBC LEUKOCYTES REDUCED: HCPCS

## 2022-12-17 PROCEDURE — 86900 BLOOD TYPING SEROLOGIC ABO: CPT

## 2022-12-17 PROCEDURE — 86850 RBC ANTIBODY SCREEN: CPT

## 2022-12-17 PROCEDURE — 99285 EMERGENCY DEPT VISIT HI MDM: CPT

## 2022-12-17 PROCEDURE — 770006 HCHG ROOM/CARE - MED/SURG/GYN SEMI*

## 2022-12-17 PROCEDURE — 36430 TRANSFUSION BLD/BLD COMPNT: CPT

## 2022-12-17 PROCEDURE — 85046 RETICYTE/HGB CONCENTRATE: CPT

## 2022-12-17 PROCEDURE — 86902 BLOOD TYPE ANTIGEN DONOR EA: CPT | Mod: 91

## 2022-12-17 PROCEDURE — 700102 HCHG RX REV CODE 250 W/ 637 OVERRIDE(OP): Performed by: HOSPITALIST

## 2022-12-17 PROCEDURE — 84145 PROCALCITONIN (PCT): CPT

## 2022-12-17 PROCEDURE — 81003 URINALYSIS AUTO W/O SCOPE: CPT

## 2022-12-17 PROCEDURE — A9270 NON-COVERED ITEM OR SERVICE: HCPCS | Performed by: HOSPITALIST

## 2022-12-17 PROCEDURE — A9270 NON-COVERED ITEM OR SERVICE: HCPCS | Performed by: STUDENT IN AN ORGANIZED HEALTH CARE EDUCATION/TRAINING PROGRAM

## 2022-12-17 PROCEDURE — 80053 COMPREHEN METABOLIC PANEL: CPT

## 2022-12-17 PROCEDURE — 700102 HCHG RX REV CODE 250 W/ 637 OVERRIDE(OP): Performed by: STUDENT IN AN ORGANIZED HEALTH CARE EDUCATION/TRAINING PROGRAM

## 2022-12-17 PROCEDURE — 86923 COMPATIBILITY TEST ELECTRIC: CPT | Mod: 91

## 2022-12-17 PROCEDURE — 84703 CHORIONIC GONADOTROPIN ASSAY: CPT

## 2022-12-17 PROCEDURE — 700105 HCHG RX REV CODE 258: Performed by: HOSPITALIST

## 2022-12-17 PROCEDURE — 85027 COMPLETE CBC AUTOMATED: CPT

## 2022-12-17 PROCEDURE — 36415 COLL VENOUS BLD VENIPUNCTURE: CPT

## 2022-12-17 PROCEDURE — 94760 N-INVAS EAR/PLS OXIMETRY 1: CPT

## 2022-12-17 PROCEDURE — 87040 BLOOD CULTURE FOR BACTERIA: CPT

## 2022-12-17 PROCEDURE — 85025 COMPLETE CBC W/AUTO DIFF WBC: CPT

## 2022-12-17 PROCEDURE — 71045 X-RAY EXAM CHEST 1 VIEW: CPT

## 2022-12-17 PROCEDURE — 86901 BLOOD TYPING SEROLOGIC RH(D): CPT

## 2022-12-17 PROCEDURE — 83605 ASSAY OF LACTIC ACID: CPT

## 2022-12-17 PROCEDURE — 96365 THER/PROPH/DIAG IV INF INIT: CPT

## 2022-12-17 PROCEDURE — 99223 1ST HOSP IP/OBS HIGH 75: CPT | Mod: AI | Performed by: HOSPITALIST

## 2022-12-17 PROCEDURE — 700111 HCHG RX REV CODE 636 W/ 250 OVERRIDE (IP): Performed by: STUDENT IN AN ORGANIZED HEALTH CARE EDUCATION/TRAINING PROGRAM

## 2022-12-17 PROCEDURE — 700105 HCHG RX REV CODE 258: Performed by: STUDENT IN AN ORGANIZED HEALTH CARE EDUCATION/TRAINING PROGRAM

## 2022-12-17 PROCEDURE — 96375 TX/PRO/DX INJ NEW DRUG ADDON: CPT

## 2022-12-17 RX ORDER — SODIUM CHLORIDE, SODIUM LACTATE, POTASSIUM CHLORIDE, CALCIUM CHLORIDE 600; 310; 30; 20 MG/100ML; MG/100ML; MG/100ML; MG/100ML
1000 INJECTION, SOLUTION INTRAVENOUS ONCE
Status: COMPLETED | OUTPATIENT
Start: 2022-12-17 | End: 2022-12-17

## 2022-12-17 RX ORDER — HYDROXYZINE HYDROCHLORIDE 25 MG/1
25 TABLET, FILM COATED ORAL 3 TIMES DAILY PRN
Status: DISCONTINUED | OUTPATIENT
Start: 2022-12-17 | End: 2022-12-18 | Stop reason: HOSPADM

## 2022-12-17 RX ORDER — ONDANSETRON 4 MG/1
4 TABLET, ORALLY DISINTEGRATING ORAL EVERY 4 HOURS PRN
Status: DISCONTINUED | OUTPATIENT
Start: 2022-12-17 | End: 2022-12-18 | Stop reason: HOSPADM

## 2022-12-17 RX ORDER — DOXYCYCLINE 100 MG/1
100 TABLET ORAL EVERY 12 HOURS
Status: DISCONTINUED | OUTPATIENT
Start: 2022-12-17 | End: 2022-12-18 | Stop reason: HOSPADM

## 2022-12-17 RX ORDER — ACETAMINOPHEN 325 MG/1
650 TABLET ORAL EVERY 6 HOURS PRN
Status: DISCONTINUED | OUTPATIENT
Start: 2022-12-17 | End: 2022-12-17

## 2022-12-17 RX ORDER — KETOROLAC TROMETHAMINE 30 MG/ML
15 INJECTION, SOLUTION INTRAMUSCULAR; INTRAVENOUS ONCE
Status: COMPLETED | OUTPATIENT
Start: 2022-12-17 | End: 2022-12-17

## 2022-12-17 RX ORDER — HYDROCODONE BITARTRATE AND ACETAMINOPHEN 5; 325 MG/1; MG/1
1 TABLET ORAL EVERY 6 HOURS PRN
Status: DISCONTINUED | OUTPATIENT
Start: 2022-12-17 | End: 2022-12-17

## 2022-12-17 RX ORDER — AZITHROMYCIN 500 MG/1
500 INJECTION, POWDER, LYOPHILIZED, FOR SOLUTION INTRAVENOUS ONCE
Status: COMPLETED | OUTPATIENT
Start: 2022-12-17 | End: 2022-12-17

## 2022-12-17 RX ORDER — PROMETHAZINE HYDROCHLORIDE 25 MG/1
12.5-25 TABLET ORAL EVERY 4 HOURS PRN
Status: DISCONTINUED | OUTPATIENT
Start: 2022-12-17 | End: 2022-12-18 | Stop reason: HOSPADM

## 2022-12-17 RX ORDER — SODIUM CHLORIDE 9 MG/ML
INJECTION, SOLUTION INTRAVENOUS CONTINUOUS
Status: DISCONTINUED | OUTPATIENT
Start: 2022-12-17 | End: 2022-12-18

## 2022-12-17 RX ORDER — FOLIC ACID 1 MG/1
1 TABLET ORAL DAILY
Status: DISCONTINUED | OUTPATIENT
Start: 2022-12-17 | End: 2022-12-18 | Stop reason: HOSPADM

## 2022-12-17 RX ORDER — PROMETHAZINE HYDROCHLORIDE 25 MG/1
12.5-25 SUPPOSITORY RECTAL EVERY 4 HOURS PRN
Status: DISCONTINUED | OUTPATIENT
Start: 2022-12-17 | End: 2022-12-18 | Stop reason: HOSPADM

## 2022-12-17 RX ORDER — NALOXONE HYDROCHLORIDE 0.4 MG/ML
0.4 INJECTION, SOLUTION INTRAMUSCULAR; INTRAVENOUS; SUBCUTANEOUS PRN
Status: DISCONTINUED | OUTPATIENT
Start: 2022-12-17 | End: 2022-12-18 | Stop reason: HOSPADM

## 2022-12-17 RX ORDER — DIPHENHYDRAMINE HCL 25 MG
25-50 TABLET ORAL EVERY 6 HOURS PRN
Status: DISCONTINUED | OUTPATIENT
Start: 2022-12-17 | End: 2022-12-17

## 2022-12-17 RX ORDER — PROCHLORPERAZINE EDISYLATE 5 MG/ML
5-10 INJECTION INTRAMUSCULAR; INTRAVENOUS EVERY 4 HOURS PRN
Status: DISCONTINUED | OUTPATIENT
Start: 2022-12-17 | End: 2022-12-18 | Stop reason: HOSPADM

## 2022-12-17 RX ORDER — ONDANSETRON 2 MG/ML
4 INJECTION INTRAMUSCULAR; INTRAVENOUS EVERY 4 HOURS PRN
Status: DISCONTINUED | OUTPATIENT
Start: 2022-12-17 | End: 2022-12-18 | Stop reason: HOSPADM

## 2022-12-17 RX ORDER — HYDROMORPHONE HYDROCHLORIDE 1 MG/ML
1 INJECTION, SOLUTION INTRAMUSCULAR; INTRAVENOUS; SUBCUTANEOUS ONCE
Status: COMPLETED | OUTPATIENT
Start: 2022-12-17 | End: 2022-12-17

## 2022-12-17 RX ORDER — BISACODYL 10 MG
10 SUPPOSITORY, RECTAL RECTAL
Status: DISCONTINUED | OUTPATIENT
Start: 2022-12-17 | End: 2022-12-18 | Stop reason: HOSPADM

## 2022-12-17 RX ORDER — KETOROLAC TROMETHAMINE 30 MG/ML
30 INJECTION, SOLUTION INTRAMUSCULAR; INTRAVENOUS ONCE
Status: COMPLETED | OUTPATIENT
Start: 2022-12-17 | End: 2022-12-17

## 2022-12-17 RX ORDER — AMOXICILLIN 250 MG
2 CAPSULE ORAL 2 TIMES DAILY
Status: DISCONTINUED | OUTPATIENT
Start: 2022-12-17 | End: 2022-12-18 | Stop reason: HOSPADM

## 2022-12-17 RX ORDER — CYCLOBENZAPRINE HCL 10 MG
10 TABLET ORAL ONCE
Status: COMPLETED | OUTPATIENT
Start: 2022-12-17 | End: 2022-12-17

## 2022-12-17 RX ORDER — FLUTICASONE PROPIONATE 44 UG/1
2 AEROSOL, METERED RESPIRATORY (INHALATION)
Status: DISCONTINUED | OUTPATIENT
Start: 2022-12-17 | End: 2022-12-18 | Stop reason: HOSPADM

## 2022-12-17 RX ORDER — HYDROXYUREA 500 MG/1
1500 CAPSULE ORAL DAILY
Status: DISCONTINUED | OUTPATIENT
Start: 2022-12-18 | End: 2022-12-18 | Stop reason: HOSPADM

## 2022-12-17 RX ORDER — VITAMIN B COMPLEX
5000 TABLET ORAL
Status: DISCONTINUED | OUTPATIENT
Start: 2022-12-17 | End: 2022-12-18 | Stop reason: HOSPADM

## 2022-12-17 RX ORDER — POLYETHYLENE GLYCOL 3350 17 G/17G
1 POWDER, FOR SOLUTION ORAL
Status: DISCONTINUED | OUTPATIENT
Start: 2022-12-17 | End: 2022-12-18 | Stop reason: HOSPADM

## 2022-12-17 RX ORDER — ACETAMINOPHEN 325 MG/1
650 TABLET ORAL EVERY 6 HOURS PRN
Status: DISCONTINUED | OUTPATIENT
Start: 2022-12-17 | End: 2022-12-18 | Stop reason: HOSPADM

## 2022-12-17 RX ORDER — ACETAMINOPHEN 325 MG/1
650 TABLET ORAL ONCE
Status: DISCONTINUED | OUTPATIENT
Start: 2022-12-17 | End: 2022-12-17

## 2022-12-17 RX ORDER — CEFTRIAXONE 1 G/1
1000 INJECTION, POWDER, FOR SOLUTION INTRAMUSCULAR; INTRAVENOUS ONCE
Status: COMPLETED | OUTPATIENT
Start: 2022-12-17 | End: 2022-12-17

## 2022-12-17 RX ORDER — CYCLOBENZAPRINE HCL 10 MG
10 TABLET ORAL 3 TIMES DAILY PRN
Status: DISCONTINUED | OUTPATIENT
Start: 2022-12-17 | End: 2022-12-18 | Stop reason: HOSPADM

## 2022-12-17 RX ADMIN — DOXYCYCLINE 100 MG: 100 TABLET, FILM COATED ORAL at 17:42

## 2022-12-17 RX ADMIN — FENTANYL CITRATE 25 MCG: 50 INJECTION, SOLUTION INTRAMUSCULAR; INTRAVENOUS at 06:21

## 2022-12-17 RX ADMIN — AMPICILLIN AND SULBACTAM 3 G: 1; 2 INJECTION, POWDER, FOR SOLUTION INTRAMUSCULAR; INTRAVENOUS at 12:27

## 2022-12-17 RX ADMIN — Medication 5000 UNITS: at 11:15

## 2022-12-17 RX ADMIN — DOXYCYCLINE 100 MG: 100 TABLET, FILM COATED ORAL at 11:16

## 2022-12-17 RX ADMIN — SODIUM CHLORIDE, POTASSIUM CHLORIDE, SODIUM LACTATE AND CALCIUM CHLORIDE 1000 ML: 600; 310; 30; 20 INJECTION, SOLUTION INTRAVENOUS at 02:50

## 2022-12-17 RX ADMIN — CYCLOBENZAPRINE 10 MG: 10 TABLET, FILM COATED ORAL at 13:01

## 2022-12-17 RX ADMIN — Medication: at 12:16

## 2022-12-17 RX ADMIN — AMPICILLIN AND SULBACTAM 3 G: 1; 2 INJECTION, POWDER, FOR SOLUTION INTRAMUSCULAR; INTRAVENOUS at 17:42

## 2022-12-17 RX ADMIN — KETOROLAC TROMETHAMINE 15 MG: 30 INJECTION, SOLUTION INTRAMUSCULAR at 03:00

## 2022-12-17 RX ADMIN — CEFTRIAXONE SODIUM 1000 MG: 1 INJECTION, POWDER, FOR SOLUTION INTRAMUSCULAR; INTRAVENOUS at 04:54

## 2022-12-17 RX ADMIN — FOLIC ACID 1 MG: 1 TABLET ORAL at 11:16

## 2022-12-17 RX ADMIN — CYCLOBENZAPRINE 10 MG: 10 TABLET, FILM COATED ORAL at 04:00

## 2022-12-17 RX ADMIN — SODIUM CHLORIDE: 9 INJECTION, SOLUTION INTRAVENOUS at 18:00

## 2022-12-17 RX ADMIN — SENNOSIDES AND DOCUSATE SODIUM 2 TABLET: 50; 8.6 TABLET ORAL at 11:15

## 2022-12-17 RX ADMIN — HYDROMORPHONE HYDROCHLORIDE 1 MG: 1 INJECTION, SOLUTION INTRAMUSCULAR; INTRAVENOUS; SUBCUTANEOUS at 02:51

## 2022-12-17 RX ADMIN — AMPICILLIN AND SULBACTAM 3 G: 1; 2 INJECTION, POWDER, FOR SOLUTION INTRAMUSCULAR; INTRAVENOUS at 23:34

## 2022-12-17 RX ADMIN — CYCLOBENZAPRINE 10 MG: 10 TABLET, FILM COATED ORAL at 22:31

## 2022-12-17 RX ADMIN — KETOROLAC TROMETHAMINE 30 MG: 30 INJECTION, SOLUTION INTRAMUSCULAR; INTRAVENOUS at 22:30

## 2022-12-17 RX ADMIN — AZITHROMYCIN MONOHYDRATE 500 MG: 500 INJECTION, POWDER, LYOPHILIZED, FOR SOLUTION INTRAVENOUS at 04:54

## 2022-12-17 ASSESSMENT — PAIN DESCRIPTION - PAIN TYPE
TYPE: CHRONIC PAIN
TYPE: ACUTE PAIN
TYPE: CHRONIC PAIN
TYPE: ACUTE PAIN

## 2022-12-17 ASSESSMENT — ENCOUNTER SYMPTOMS
SORE THROAT: 0
BACK PAIN: 1
DIZZINESS: 0
ABDOMINAL PAIN: 0
BRUISES/BLEEDS EASILY: 0
COUGH: 0
SHORTNESS OF BREATH: 0
WEAKNESS: 0
NERVOUS/ANXIOUS: 1
VOMITING: 0
PALPITATIONS: 0
ORTHOPNEA: 0
INSOMNIA: 0
HEADACHES: 0
HEARTBURN: 0
BLURRED VISION: 0
FEVER: 0
MYALGIAS: 1
FOCAL WEAKNESS: 0
DEPRESSION: 0
DOUBLE VISION: 0
CHILLS: 1
NAUSEA: 0
HEMOPTYSIS: 0
SPUTUM PRODUCTION: 0
NECK PAIN: 0

## 2022-12-17 ASSESSMENT — LIFESTYLE VARIABLES
AVERAGE NUMBER OF DAYS PER WEEK YOU HAVE A DRINK CONTAINING ALCOHOL: 0
TOTAL SCORE: 0
ALCOHOL_USE: YES
CONSUMPTION TOTAL: NEGATIVE
TOTAL SCORE: 0
ON A TYPICAL DAY WHEN YOU DRINK ALCOHOL HOW MANY DRINKS DO YOU HAVE: 1
HAVE YOU EVER FELT YOU SHOULD CUT DOWN ON YOUR DRINKING: NO
EVER HAD A DRINK FIRST THING IN THE MORNING TO STEADY YOUR NERVES TO GET RID OF A HANGOVER: NO
EVER FELT BAD OR GUILTY ABOUT YOUR DRINKING: NO
HOW MANY TIMES IN THE PAST YEAR HAVE YOU HAD 5 OR MORE DRINKS IN A DAY: 0
HAVE PEOPLE ANNOYED YOU BY CRITICIZING YOUR DRINKING: NO
TOTAL SCORE: 0

## 2022-12-17 ASSESSMENT — COGNITIVE AND FUNCTIONAL STATUS - GENERAL
EATING MEALS: A LITTLE
DRESSING REGULAR LOWER BODY CLOTHING: A LITTLE
DAILY ACTIVITIY SCORE: 18
SUGGESTED CMS G CODE MODIFIER MOBILITY: CK
PERSONAL GROOMING: A LITTLE
WALKING IN HOSPITAL ROOM: A LITTLE
STANDING UP FROM CHAIR USING ARMS: A LITTLE
DRESSING REGULAR UPPER BODY CLOTHING: A LITTLE
MOBILITY SCORE: 18
CLIMB 3 TO 5 STEPS WITH RAILING: A LITTLE
TURNING FROM BACK TO SIDE WHILE IN FLAT BAD: A LITTLE
MOVING FROM LYING ON BACK TO SITTING ON SIDE OF FLAT BED: A LITTLE
HELP NEEDED FOR BATHING: A LITTLE
TOILETING: A LITTLE
MOVING TO AND FROM BED TO CHAIR: A LITTLE
SUGGESTED CMS G CODE MODIFIER DAILY ACTIVITY: CK

## 2022-12-17 ASSESSMENT — FIBROSIS 4 INDEX
FIB4 SCORE: 0.54
FIB4 SCORE: 0.54
FIB4 SCORE: 0.59

## 2022-12-17 NOTE — H&P
Hospital Medicine History & Physical Note    Date of Service  12/17/2022    Primary Care Physician  Cullen Castro M.D.    Consultants  None    Code Status  Prior    Chief Complaint  Chief Complaint   Patient presents with    Sickle Cell Pain Crisis     PT presents d/t sickle cell pain crisis x 2 days. PT states she needs admission and blood transfusion.       History of Presenting Illness  Henry Anand is a 22 y.o. female, known to me from prior admission.  She was admitted on 12/14/2022 for management of sickle cell crisis.  Her initial hemoglobin was 7.2.  She was having back pain and is started on narcotics and later Dilaudid PCA.  Patient was also started on antibiotics and during the hospitalization had fever and increasing WBC.  Her hemoglobin was progressively dropping, up from 7.2 initially to 6.4 yesterday.  Hospitalist had discussed this with hematologist and recommendation was to transfuse only if lower than 6.5.  Blood transfusion was ordered but given his special type of blood she needs, there was a delay in obtaining the PRBC unit that is coming from [place].  Patient had appointments and important things that she could not delay to take care of during the day and left AGAINST MEDICAL ADVICE early morning around 4 AM yesterday.  She was advised to come back to the hospital as soon as possible, because she will likely need still blood transfusion.  She returns to the ED today on 12/17/2022 with ongoing complains of worsening back pain and increasing about transfusion.  She denies fever, rectal bleeding, bloody stool, hematuria.    ER COURSE:  -Afebrile and tachycardic.  -Hemoglobin this morning is 5.5  -WBC 15.7.  Procalcitonin 0.26 today.  Chest x-ray showing hazy left lower lobe infiltrate  -She was started on Rocephin and azithromycin.  She was also given Flexeril.  -For her pain she received Toradol and Dilaudid in addition to 1 L bolus LR.  -PRBC/special blood ordered by ERP  and discussed with blood bank and I was called for readmission    I discussed the plan of care with patient.    Review of Systems  Review of Systems   Constitutional:  Positive for chills and malaise/fatigue. Negative for fever.   HENT:  Negative for congestion and sore throat.    Eyes:  Negative for blurred vision and double vision.   Respiratory:  Negative for cough and shortness of breath.    Cardiovascular:  Negative for chest pain and palpitations.   Gastrointestinal:  Negative for nausea and vomiting.   Genitourinary:  Negative for dysuria and urgency.   Musculoskeletal:  Positive for back pain and myalgias. Negative for neck pain.   Skin:  Negative for itching and rash.   Neurological:  Negative for dizziness, weakness and headaches.   Endo/Heme/Allergies:  Does not bruise/bleed easily.   Psychiatric/Behavioral:  Negative for depression. The patient does not have insomnia.      Past Medical History   has a past medical history of Asthma (2021), Gallstones with biliary obstruction (11/2014), Heart murmur, and Sickle cell anemia without crisis (HCC) (2001).    Surgical History   has a past surgical history that includes cholecystectomy (11/3/2014); ercp (N/A, 10/30/2014); other (2018); septoturbinoplasty (Bilateral, 5/4/2021); antrostomy (5/4/2021); and ethmoidectomy, endoscopic (5/4/2021).     Family History  family history includes Anemia in her sister; Sleep Apnea in her brother, father, and mother; Stroke in her sister.   Family history reviewed with patient. There is no family history that is pertinent to the chief complaint.     Social History   reports that she has never smoked. She has never used smokeless tobacco. She reports that she does not currently use alcohol. She reports current drug use. Drug: Inhaled.    Allergies  Allergies   Allergen Reactions    Haloperidol Unspecified     Received [facility] x 2 [facility] in [facility] and developed an acute dystonic reaction, treated with diphenhydramine        Medications  Prior to Admission Medications   Prescriptions Last Dose Informant Patient Reported? Taking?   Cholecalciferol (VITAMIN D3) 125 MCG (5000 UT) Cap  Patient Yes No   Sig: Take 5,000 Units by mouth every day.   HYDROcodone-acetaminophen (NORCO) 5-325 MG Tab per tablet  Patient Yes No   Sig: Take 1 Tablet by mouth every 6 hours as needed. Indications: Pain   albuterol 108 (90 Base) MCG/ACT Aero Soln inhalation aerosol  Patient No No   Sig: Inhale 2 Puffs every 6 hours as needed for Shortness of Breath.   diphenhydrAMINE (BENADRYL) 25 MG Tab  Patient Yes No   Sig: Take 25-50 mg by mouth every 6 hours as needed. Indications: Hayfever   fluticasone (FLOVENT HFA) 44 MCG/ACT Aerosol  Patient Yes No   Sig: Inhale 2 Puffs as needed (Per pt takes as needed for SOB).   folic acid (FOLVITE) 1 MG Tab  Patient No No   Sig: Take 1 Tab by mouth every day.   hydroxyurea (HYDREA) 500 MG Cap  Patient Yes No   Sig: Take 1,500 mg by mouth every day.   ibuprofen (MOTRIN) 200 MG Tab  Patient Yes No   Sig: Take 200 mg by mouth every 6 hours as needed for Mild Pain.   loratadine (CLARITIN) 10 MG Tab  Patient Yes No   Sig: Take 10 mg by mouth as needed. Indications: Hayfever   penicillin v potassium (VEETID) 250 MG Tab  Patient No No   Sig: Take 1 Tab by mouth 2 times a day.   Patient taking differently: Take 250 mg by mouth 2 times a day. Maintenance  dose      Facility-Administered Medications: None       Physical Exam  Temp:  [37.6 °C (99.7 °F)] 37.6 °C (99.7 °F)  Pulse:  [107] 107  Resp:  [18] 18  BP: (115)/(67) 115/67  SpO2:  [92 %] 92 %  Blood Pressure: 115/67   Temperature: 37.6 °C (99.7 °F)   Pulse: (!) 107   Respiration: 18   Pulse Oximetry: 92 %       Physical Exam  Constitutional:       Appearance: Normal appearance.   HENT:      Head: Normocephalic and atraumatic.      Mouth/Throat:      Mouth: Mucous membranes are moist.      Pharynx: Oropharynx is clear.   Eyes:      Extraocular Movements: Extraocular movements  intact.      Pupils: Pupils are equal, round, and reactive to light.   Cardiovascular:      Rate and Rhythm: Regular rhythm. Tachycardia present.      Heart sounds: Normal heart sounds.   Pulmonary:      Effort: Pulmonary effort is normal. No respiratory distress.      Breath sounds: Normal breath sounds. No wheezing or rales.   Abdominal:      General: Abdomen is flat. Bowel sounds are normal.      Palpations: Abdomen is soft.      Tenderness: There is no abdominal tenderness. There is no guarding.   Musculoskeletal:      Cervical back: Normal range of motion and neck supple.   Skin:     General: Skin is warm and dry.   Neurological:      General: No focal deficit present.      Mental Status: She is alert and oriented to person, place, and time.   Psychiatric:         Mood and Affect: Mood normal.         Behavior: Behavior normal.       Laboratory:  Recent Labs     12/15/22  1613 12/15/22  2123 12/17/22  0252   WBC 19.0* 19.7* 15.7*   RBC 2.12* 2.01* 1.73*   HEMOGLOBIN 6.8* 6.4* 5.5*   HEMATOCRIT 18.6* 17.8* 15.5*   MCV 87.7 88.6 89.6   MCH 32.1 31.8 31.8   MCHC 36.6* 36.0* 35.5*   RDW 72.1* 72.2* 70.8*   PLATELETCT 262 247 261   MPV 9.1 9.0 9.3     Recent Labs     12/14/22  2153 12/15/22  0420 12/17/22  0252   SODIUM 131* 135 138   POTASSIUM 4.0 4.2 3.4*   CHLORIDE 103 106 103   CO2 20 20 22   GLUCOSE 96 99 84   BUN 5* 5* 7*   CREATININE 0.43* 0.40* 0.46*   CALCIUM 8.3* 8.4 8.5     Recent Labs     12/14/22 2153 12/15/22  0420 12/17/22  0252   ALTSGPT 23  --  20   ASTSGOT 33  --  30   ALKPHOSPHAT 84  --  94   TBILIRUBIN 1.6*  --  2.5*   GLUCOSE 96 99 84         No results for input(s): NTPROBNP in the last 72 hours.      No results for input(s): TROPONINT in the last 72 hours.    Imaging:  DX-CHEST-PORTABLE (1 VIEW)   Final Result         1.  Hazy left lower lobe infiltrate.   2.  Cardiomegaly            Assessment/Plan:  Justification for Admission Status  I anticipate this patient will require at least two  midnights for appropriate medical management, necessitating inpatient admission because 22-year-old female, sickle cell crisis requiring blood transfusion for hemoglobin of 5.5 initially.  She left AMA yesterday.    * Sickle cell crisis (HCC)- (present on admission)  Assessment & Plan  -Inpatient status to medical floor with cardiac monitoring.  -Hemoglobin on admission is 5.5.  She will need PRBC blood transfusion (Hgb S negative, C/E/K negative).  This is a kind of special blood that will come from [place].  I discussed in extension with blood bank, confirmed with [facility] that this order was correctly placed and requested to expedited if possible.    -Patient was admitted here earlier in the week and left AMA yesterday.  At that time, her hemoglobin was 6.4 as she was awaiting for transfusion but had other important things to do.  -Patient is in increasing pain.  During this hospitalization she had Dilaudid PCA and according to her that was ineffective pain control.  At home, she takes oxycodone that is also not improving significantly her pain.  I am doing 1 trial of fentanyl, she may benefit from a different regimen for pain.  -Empirically started on antibiotics.    -If hemoglobin continues to progressively drop or quickly drop requiring multiple units of special type of blood transfusion, she may need to be transferred for higher level of care at a place with bigger blood bank that can supply for this.    Sepsis due to pneumonia (HCC)  Assessment & Plan  This is Sepsis Present on admission  SIRS criteria identified on my evaluation include: Tachycardia, with heart rate greater than 90 BPM and Leukocytosis, with WBC greater than 12,000  Source is left lower lobe pneumonia under the context of sickle cell crisis  Sepsis protocol initiated  Fluid resuscitation ordered per protocol  Crystalloid Fluid Administration: Fluid resuscitation ordered per standard protocol - 30 mL/kg per current or ideal body weight  IV  antibiotics as appropriate for source of sepsis  Reassessment: I have reassessed the patient's hemodynamic status          Hypokalemia  Assessment & Plan  -Replace electrolytes as needed    Mild persistent asthma without complication- (present on admission)  Assessment & Plan  -She is not wheezing and not on exacerbation.  Treat as needed.    Anxiety associated with depression- (present on admission)  Assessment & Plan  -Patient has expressed increasing frustration with hospitalization, delay on receiving blood etc.  She is not suicidal.  Mother is a good support and stayed with her throughout last hospitalization.  High risk of leaving AMA.      VTE prophylaxis: SCDs/TEDs

## 2022-12-17 NOTE — HOSPITAL COURSE
This is a 22-year-old female medical history significant for sickle cell disease was admitted on 12/14/2022 for the management of sickle cell crisis  Her initial hemoglobin noted to be 7.2, she was having back pain and was started on narcotics and later Dilaudid PCA.  She was also started on antibiotics during the hospitalization as she had fever and increasing WBC.    Her hemoglobin progressively dropping from 7.2-6.4.  Dr. Pedroza had discussed with hematologist and recommendation was to transfuse 1 if less than 6.5 patient.  She reports he has some appointments the stated left AGAINST MEDICAL ADVICE.    Presented to ER on 12/17/2022 with a complaint of ongoing back pain; upon presentation her hemoglobin 5.5 WBC of 15.7, procalcitonin mildly elevated at 126.  Patient was started on broad-spectrum antibiotics including Unasyn and doxycycline.  Blood culture x2 obtained, no growth to date.    During the stay in the hospital, patient was started on aggressive IV fluid resuscitation along with pain management.  Today on 12/18/2022, patient is crying, yelling and was adamant about going home.  At this time patient will be discharged home on Augmentin and doxycycline.  She was recommended to follow-up with sickle cell patient as an outpatient.     Patient mother is noted to be at bedside, plan of care has been discussed with patient, nursing staff, case management      Interval events:  -- No acute events overnight, medicine has been stable, heart rate 92-1 07, saturating 97% on room air  -- Upon presentation WBC is 15.7, hemoglobin 5.5 procalcitonin 1.26 lactic acid 1.4  -- Chest x-ray showing hazy left lower lobe infiltrate, patient was started on antibiotics in ER including Rocephin and azithromycin.  -- We will continue IV antibiotics during the stay in the hospital.  -- Considering patient being severely anemic, blood transfusion has been ordered.

## 2022-12-17 NOTE — ED TRIAGE NOTES
"Chief Complaint   Patient presents with    Sickle Cell Pain Crisis     PT presents d/t sickle cell pain crisis x 2 days. PT states she needs admission and blood transfusion.     /67   Pulse (!) 107   Temp 37.6 °C (99.7 °F) (Temporal)   Resp 18   Ht 1.575 m (5' 2\")   Wt 57.1 kg (125 lb 14.1 oz)   SpO2 92%   BMI 23.02 kg/m²     "

## 2022-12-17 NOTE — ASSESSMENT & PLAN NOTE
-Inpatient status to medical floor with cardiac monitoring.  -Hemoglobin on admission is 5.5.  She will need PRBC blood transfusion (Hgb S negative, C/E/K negative).  This is a kind of special blood that will come from [place]   -Upon my evaluation, patient sleeping, will start the patient on Dilaudid PCA pump, monitor her pain   -- blood transfusion has been ordered

## 2022-12-17 NOTE — ED PROVIDER NOTES
ED Provider Note    CHIEF COMPLAINT  Chief Complaint   Patient presents with    Sickle Cell Pain Crisis     PT presents d/t sickle cell pain crisis x 2 days. PT states she needs admission and blood transfusion.         LIMITATION TO HISTORY   Select: : None    HPI  Henry Anand is a 22 y.o. female who presents evaluation of right buttock pain as well as diffuse bilateral leg pain.  She was admitted to the hospital yesterday pending a blood transfusion which unfortunately due to the patient's sickle magdalena and requiring specialized blood product was coming from [place] given that the transfusion would take some time she did leave AGAINST MEDICAL ADVICE.  Came back this evening due to persistent pain and stated that she took care of the apartment issue which caused her to leave AGAINST MEDICAL ADVICE previously.      OUTSIDE HISTORIAN(S):  Select: Family      EXTERNAL RECORDS REVIEWED  Select: Inpatient Notes recent admission    REVIEW OF SYSTEMS  See HPI for further details. All other systems are negative.     PAST MEDICAL HISTORY   has a past medical history of Asthma (2021), Gallstones with biliary obstruction (11/2014), Heart murmur, and Sickle cell anemia without crisis (HCC) (2001).    SOCIAL HISTORY  Social History     Tobacco Use    Smoking status: Never    Smokeless tobacco: Never   Vaping Use    Vaping Use: Never used   Substance and Sexual Activity    Alcohol use: Not Currently    Drug use: Yes     Types: Inhaled     Comment: job , 2 joints    Sexual activity: Not on file         SURGICAL HISTORY   has a past surgical history that includes cholecystectomy (11/3/2014); ercp (N/A, 10/30/2014); other (2018); septoturbinoplasty (Bilateral, 5/4/2021); antrostomy (5/4/2021); and ethmoidectomy, endoscopic (5/4/2021).    CURRENT MEDICATIONS  Home Medications       Reviewed by Anton Baker R.N. (Registered Nurse) on 12/17/22 at 0232  Med List Status: Not Addressed     Medication  "Last Dose Status   albuterol 108 (90 Base) MCG/ACT Aero Soln inhalation aerosol  Active   Cholecalciferol (VITAMIN D3) 125 MCG (5000 UT) Cap  Active   diphenhydrAMINE (BENADRYL) 25 MG Tab  Active   fluticasone (FLOVENT HFA) 44 MCG/ACT Aerosol  Active   folic acid (FOLVITE) 1 MG Tab  Active   HYDROcodone-acetaminophen (NORCO) 5-325 MG Tab per tablet  Active   hydroxyurea (HYDREA) 500 MG Cap  Active   ibuprofen (MOTRIN) 200 MG Tab  Active   loratadine (CLARITIN) 10 MG Tab  Active   penicillin v potassium (VEETID) 250 MG Tab  Active                      ALLERGIES  Allergies   Allergen Reactions    Haloperidol Unspecified     Received Haldol x 2 doses in [facility] PICU and developed an acute dystonic reaction, treated with diphenhydramine         PHYSICAL EXAM  VITAL SIGNS: /67   Pulse (!) 107   Temp 37.6 °C (99.7 °F) (Temporal)   Resp 18   Ht 1.575 m (5' 2\")   Wt 57.1 kg (125 lb 14.1 oz)   SpO2 92%   BMI 23.02 kg/m²  @PASTOR[[id number]::@   Pulse ox interpretation: I interpret this pulse ox as normal.  VITALS - vital signs documented prior to this note have been reviewed and noted,  GENERAL - awake, alert, oriented, GCS 15, no apparent distress, non-toxic  appearing  HEENT - normocephalic, atraumatic, pupils equal, sclera anicteric, mucus  membranes moist  NECK - supple, no meningismus, full active range of motion, trachea midline  CARDIOVASCULAR - regular rate/rhythm, no murmurs/gallops/rubs  PULMONARY - no respiratory distress, speaking in full sentences, clear to  auscultation bilaterally, no wheezing/ronchi/rales, no accessory muscle use  GASTROINTESTINAL - soft, non-tender, non-distended, no rebound, guarding,  or peritonitis  GENITOURINARY - Deferred  NEUROLOGIC - Awake alert, normal mental status, speech fluid, cognition  normal, moves all extremities  MUSCULOSKELETAL - no obvious asymmetry or deformities present  EXTREMITIES - warm, well-perfused, no cyanosis or significant edema  DERMATOLOGIC - warm, dry, " no rashes, no jaundice  PSYCHIATRIC - normal affect, normal insight, normal concentration      DIAGNOSTIC STUDIES / PROCEDURES    LABS  Remarkable for a hypochromic microcytic anemia elevated reticulocyte count as well as leukocytosis procalcitonin is elevated mild hypokalemia increased bilirubin  Labs Reviewed   CBC WITH DIFFERENTIAL - Abnormal; Notable for the following components:       Result Value    WBC 15.7 (*)     RBC 1.73 (*)     Hemoglobin 5.5 (*)     Hematocrit 15.5 (*)     MCHC 35.5 (*)     RDW 70.8 (*)     Neutrophils (Absolute) 10.12 (*)     Monos (Absolute) 1.13 (*)     All other components within normal limits   COMP METABOLIC PANEL - Abnormal; Notable for the following components:    Potassium 3.4 (*)     Bun 7 (*)     Creatinine 0.46 (*)     Total Bilirubin 2.5 (*)     All other components within normal limits   PROCALCITONIN - Abnormal; Notable for the following components:    Procalcitonin 0.26 (*)     All other components within normal limits   RETICULOCYTES COUNT - Abnormal; Notable for the following components:    Reticulocyte Count 22.5 (*)     Retic, Absolute 0.39 (*)     Imm. Reticulocyte Fraction 28.6 (*)     All other components within normal limits   LACTIC ACID   HCG QUAL SERUM   CORRECTED CALCIUM   ESTIMATED GFR   BLOOD CULTURE   BLOOD CULTURE   COD (ADULT)   URINALYSIS   TRANSFUSE RED BLOOD CELLS-NURSING COMMUNICATION (UNITS)         RADIOLOGY  Chest x-ray does show left lower lobe infiltrate upon my review may be consistent with a community-acquired pneumonia    DX-CHEST-PORTABLE (1 VIEW)   Final Result         1.  Hazy left lower lobe infiltrate.   2.  Cardiomegaly            COURSE & MEDICAL DECISION MAKING  Pertinent Labs & Imaging studies reviewed. (See chart for details)  Patient presented for evaluation of diffuse bilateral knee and right hip pain in the setting of sickle cell disease.  Differential included was not limited to sickle cell pain crisis, dehydration electrolyte  abnormality anemia pneumonia sepsis among many other considerations.  Patient had previously been admitted and then left AMA less than 24 hours ago was being treated for a possible pneumonia as such a sepsis protocol was initiated procalcitonin is ordered a repeat chest x-ray was obtained.  X-ray does show left hazy infiltrate may be consistent with community-acquired pneumonia.  No hypoxia no diffuse infiltrates no significant shortness of breath low concern for acute chest at this time. Was on ceftriaxone azithromycin thus this will be continued.  Her hemoglobin resulted at 5.5, she has an elevated reticulocyte count as well as elevated bilirubin which is consistent with ongoing sickle cell crisis.  Given her hemoglobin of 5.5 she does require a transfusion.  She had specialized blood ordered from [place] which is available, and is being brought over from the blood bank.  Transfusion is ordered.  Patient will be admitted to the hospital for further care and evaluation of above    Critical care critical care    Critical Care Procedure Note    Total critical care time: Approximately 36 minutes    Upon my assess due to a high probability of clinically significant, life threatening deterioration, secondary to  Anemia requiring transfusion the patient required my direct attention and intervention. This critical care time included obtaining a history; examining the patient; pulse oximetry; ordering and review of studies; arranging urgent treatment with development of a management plan; evaluation of patient's response to treatment; frequent reassessment; and, discussions with other providers.    was exclusive of separately billable procedures and treating other patients and teaching time.   DISCUSSION OF MANAGEMENT WITH OTHER PHYSICIANS, QHP OR APPROPRIATE SOURCE  Select: Admitting team dr ford    INDEPENDENT INTERPRETATION OF STUDIES  Select: X-ray(s) pneumonia in the left lower lobe    ESCALATION OF  CARE  intravenous fluids were considered, blood analysis was considered, and imaging was considered    SOCIAL DETERMINANTS THAT SIGNIFICANTLY AFFECT CARE  Select: none    PRESCRIPTION DRUG CONSIDERED  Select: Antibiotics rocephin azithro and Pain Medications mutli modal pain control    DIAGNOSTICS TESTS CONSIDERED  none         @HYDRATION: Based on the patient's presentation of Sepsis and Tachycardia the patient was given IV fluids. IV Hydration was used because oral hydration was not adequate alone. Upon recheck following hydration, the patient was improved.@    The patient will not drink alcohol nor drive with prescribed medications. The patient will return for worsening symptoms and is stable at the time of discharge. The patient verbalizes understanding and will comply.    FINAL IMPRESSION  1.  Sickle cell pain crisis  2.  Anemia requiring transfusion  3.  Pneumonia  4.  Sepsis         Electronically signed by: Cr Duque D.O., 12/17/2022 3:40 AM

## 2022-12-17 NOTE — ASSESSMENT & PLAN NOTE
This is Sepsis Present on admission  SIRS criteria identified on my evaluation include: Tachycardia, with heart rate greater than 90 BPM and Leukocytosis, with WBC greater than 12,000  Source is left lower lobe pneumonia under the context of sickle cell crisis  Sepsis protocol initiated  Fluid resuscitation ordered per protocol  Crystalloid Fluid Administration: Fluid resuscitation ordered per standard protocol - 30 mL/kg per current or ideal body weight  IV antibiotics as appropriate for source of sepsis  Reassessment: I have reassessed the patient's hemodynamic status      Blood cultures x2 ordered, patient was started Unasyn and doxycycline, MRSA nasal swab ordered , pending

## 2022-12-17 NOTE — PROGRESS NOTES
[facility] Medicine Daily Progress Note    Date of Service  12/17/2022    Chief Complaint  Henry Anand is a 22 y.o. female admitted 12/17/2022 with   Chief Complaint   Patient presents with    Sickle Cell Pain Crisis     PT presents d/t sickle cell pain crisis x 2 days. PT states she needs admission and blood transfusion.         Hospital Course  This is a 22-year-old female medical history significant for sickle cell disease was admitted on 12/14/2022 for the management of sickle cell crisis  Her initial hemoglobin noted to be 7.2, she was having back pain and was started on narcotics and later Dilaudid PCA.  She was also started on antibiotics during the hospitalization as she had fever and increasing WBC.    Her hemoglobin progressively dropping from 7.2-6.4.  Dr. Pedroza had discussed with hematologist and recommendation was to transfuse 1 if less than 6.5 patient.  She reports he has some appointments the stated left AGAINST MEDICAL ADVICE.    Presented to ER on 12/17/2022 with a complaint of ongoing back pain; upon presentation her hemoglobin 5.5 WBC 3.7 procalcitonin 2.26 chest x-ray showing hazy left lobe infiltrate.  She was started on Rocephin and azithromycin.  She did receive Toradol, Dilaudid in addition to 1 L bolus.  ERP has ordered special blood .    Interval events:  -- No acute events overnight, medicine has been stable, heart rate 92-1 07, saturating 97% on room air  -- Upon presentation WBC is 15.7, hemoglobin 5.5 procalcitonin 1.26 lactic acid 1.4  -- Chest x-ray showing hazy left lower lobe infiltrate, patient was started on antibiotics in ER including Rocephin and azithromycin.  -- We will continue IV antibiotics during the stay in the hospital.  -- Considering patient being severely anemic, blood transfusion has been ordered.      I have discussed this patient's plan of care and discharge plan at IDT rounds today with Case Management, Nursing, Nursing leadership, and other  members of the IDT team.    Consultants/Specialty  none    Code Status  Full Code    Disposition  Patient is not medically cleared for discharge.   Anticipate discharge to to home with close outpatient follow-up.  I have placed the appropriate orders for post-discharge needs.    Review of Systems  Review of Systems   Constitutional:  Positive for malaise/fatigue. Negative for fever.   HENT:  Negative for congestion.    Eyes:  Negative for blurred vision and double vision.   Respiratory:  Negative for cough, hemoptysis and sputum production.    Cardiovascular:  Negative for chest pain, palpitations and orthopnea.   Gastrointestinal:  Negative for abdominal pain, heartburn and nausea.   Musculoskeletal:  Positive for joint pain and myalgias.   Neurological:  Negative for focal weakness and headaches.   Psychiatric/Behavioral:  The patient is nervous/anxious.    All other systems reviewed and are negative.     Physical Exam  Temp:  [36.4 °C (97.6 °F)-37.6 °C (99.7 °F)] 36.6 °C (97.9 °F)  Pulse:  [] 90  Resp:  [14-20] 15  BP: ()/(53-67) 108/60  SpO2:  [92 %-97 %] 92 %    Physical Exam  Vitals and nursing note reviewed.   Constitutional:       Appearance: Normal appearance.   HENT:      Head: Normocephalic and atraumatic.   Eyes:      Extraocular Movements: Extraocular movements intact.      Pupils: Pupils are equal, round, and reactive to light.   Cardiovascular:      Rate and Rhythm: Normal rate.   Pulmonary:      Effort: No respiratory distress.      Breath sounds: Normal breath sounds.   Abdominal:      General: Bowel sounds are normal. There is no distension.      Palpations: Abdomen is soft.   Musculoskeletal:      Cervical back: Neck supple.      Right lower leg: No edema.      Left lower leg: No edema.   Neurological:      Mental Status: She is alert and oriented to person, place, and time.   Psychiatric:         Mood and Affect: Mood normal.       Fluids    Intake/Output Summary (Last 24 hours) at  12/17/2022 1148  Last data filed at 12/17/2022 0502  Gross per 24 hour   Intake 1000 ml   Output --   Net 1000 ml       Laboratory  Recent Labs     12/15/22  1613 12/15/22  2123 12/17/22  0252   WBC 19.0* 19.7* 15.7*   RBC 2.12* 2.01* 1.73*   HEMOGLOBIN 6.8* 6.4* 5.5*   HEMATOCRIT 18.6* 17.8* 15.5*   MCV 87.7 88.6 89.6   MCH 32.1 31.8 31.8   MCHC 36.6* 36.0* 35.5*   RDW 72.1* 72.2* 70.8*   PLATELETCT 262 247 261   MPV 9.1 9.0 9.3     Recent Labs     12/14/22  2153 12/15/22  0420 12/17/22  0252   SODIUM 131* 135 138   POTASSIUM 4.0 4.2 3.4*   CHLORIDE 103 106 103   CO2 20 20 22   GLUCOSE 96 99 84   BUN 5* 5* 7*   CREATININE 0.43* 0.40* 0.46*   CALCIUM 8.3* 8.4 8.5                   Imaging  DX-CHEST-PORTABLE (1 VIEW)   Final Result         1.  Hazy left lower lobe infiltrate.   2.  Cardiomegaly           Assessment/Plan  * Sickle cell crisis (HCC)- (present on admission)  Assessment & Plan  -Inpatient status to medical floor with cardiac monitoring.  -Hemoglobin on admission is 5.5.  She will need PRBC blood transfusion (Hgb S negative, C/E/K negative).  This is a kind of special blood that will come from [place]   -Upon my evaluation, patient sleeping, will start the patient on Dilaudid PCA pump, monitor her pain   -- blood transfusion has been ordered     Sepsis due to pneumonia (HCC)  Assessment & Plan  This is Sepsis Present on admission  SIRS criteria identified on my evaluation include: Tachycardia, with heart rate greater than 90 BPM and Leukocytosis, with WBC greater than 12,000  Source is left lower lobe pneumonia under the context of sickle cell crisis  Sepsis protocol initiated  Fluid resuscitation ordered per protocol  Crystalloid Fluid Administration: Fluid resuscitation ordered per standard protocol - 30 mL/kg per current or ideal body weight  IV antibiotics as appropriate for source of sepsis  Reassessment: I have reassessed the patient's hemodynamic status      Blood cultures x2 ordered, patient  was started Unasyn and doxycycline, MRSA nasal swab ordered , pending     Hypokalemia  Assessment & Plan  -Replace electrolytes as needed    Mild persistent asthma without complication- (present on admission)  Assessment & Plan  -She is not wheezing and not on exacerbation.  Treat as needed.    Anxiety associated with depression- (present on admission)  Assessment & Plan  -- provide hydroxyzine prn          VTE prophylaxis: SCDs/TEDs    I

## 2022-12-18 VITALS
HEART RATE: 87 BPM | OXYGEN SATURATION: 97 % | DIASTOLIC BLOOD PRESSURE: 56 MMHG | HEIGHT: 62 IN | RESPIRATION RATE: 16 BRPM | SYSTOLIC BLOOD PRESSURE: 105 MMHG | TEMPERATURE: 98.1 F | BODY MASS INDEX: 23.08 KG/M2 | WEIGHT: 125.44 LBS

## 2022-12-18 LAB
ANION GAP SERPL CALC-SCNC: 8 MMOL/L (ref 7–16)
BUN SERPL-MCNC: 5 MG/DL (ref 8–22)
CALCIUM SERPL-MCNC: 8.3 MG/DL (ref 8.4–10.2)
CHLORIDE SERPL-SCNC: 103 MMOL/L (ref 96–112)
CO2 SERPL-SCNC: 24 MMOL/L (ref 20–33)
CREAT SERPL-MCNC: 0.34 MG/DL (ref 0.5–1.4)
ERYTHROCYTE [DISTWIDTH] IN BLOOD BY AUTOMATED COUNT: 57.8 FL (ref 35.9–50)
GFR SERPLBLD CREATININE-BSD FMLA CKD-EPI: 150 ML/MIN/1.73 M 2
GLUCOSE SERPL-MCNC: 89 MG/DL (ref 65–99)
HCT VFR BLD AUTO: 21.7 % (ref 37–47)
HGB BLD-MCNC: 7.9 G/DL (ref 12–16)
MCH RBC QN AUTO: 31.6 PG (ref 27–33)
MCHC RBC AUTO-ENTMCNC: 36.4 G/DL (ref 33.6–35)
MCV RBC AUTO: 86.8 FL (ref 81.4–97.8)
PLATELET # BLD AUTO: 247 K/UL (ref 164–446)
PMV BLD AUTO: 9.6 FL (ref 9–12.9)
POTASSIUM SERPL-SCNC: 3.7 MMOL/L (ref 3.6–5.5)
PROCALCITONIN SERPL-MCNC: 0.17 NG/ML
RBC # BLD AUTO: 2.5 M/UL (ref 4.2–5.4)
SODIUM SERPL-SCNC: 135 MMOL/L (ref 135–145)
WBC # BLD AUTO: 14.1 K/UL (ref 4.8–10.8)

## 2022-12-18 PROCEDURE — A9270 NON-COVERED ITEM OR SERVICE: HCPCS | Performed by: HOSPITALIST

## 2022-12-18 PROCEDURE — 80048 BASIC METABOLIC PNL TOTAL CA: CPT

## 2022-12-18 PROCEDURE — 700102 HCHG RX REV CODE 250 W/ 637 OVERRIDE(OP): Performed by: HOSPITALIST

## 2022-12-18 PROCEDURE — 700105 HCHG RX REV CODE 258: Performed by: HOSPITALIST

## 2022-12-18 PROCEDURE — 84145 PROCALCITONIN (PCT): CPT

## 2022-12-18 PROCEDURE — 36415 COLL VENOUS BLD VENIPUNCTURE: CPT

## 2022-12-18 PROCEDURE — 99239 HOSP IP/OBS DSCHRG MGMT >30: CPT | Performed by: HOSPITALIST

## 2022-12-18 PROCEDURE — 85027 COMPLETE CBC AUTOMATED: CPT

## 2022-12-18 RX ORDER — SODIUM CHLORIDE 9 MG/ML
INJECTION, SOLUTION INTRAVENOUS CONTINUOUS
Status: DISCONTINUED | OUTPATIENT
Start: 2022-12-18 | End: 2022-12-18 | Stop reason: HOSPADM

## 2022-12-18 RX ORDER — POLYETHYLENE GLYCOL 3350 17 G/17G
17 POWDER, FOR SOLUTION ORAL
Qty: 15 EACH | Refills: 3 | Status: SHIPPED | OUTPATIENT
Start: 2022-12-18 | End: 2023-11-30

## 2022-12-18 RX ORDER — AMOXICILLIN 250 MG
2 CAPSULE ORAL 2 TIMES DAILY
Qty: 30 TABLET | Refills: 0 | Status: SHIPPED | OUTPATIENT
Start: 2022-12-18 | End: 2023-11-30

## 2022-12-18 RX ORDER — AMOXICILLIN AND CLAVULANATE POTASSIUM 875; 125 MG/1; MG/1
1 TABLET, FILM COATED ORAL EVERY 12 HOURS
Status: DISCONTINUED | OUTPATIENT
Start: 2022-12-18 | End: 2022-12-18 | Stop reason: HOSPADM

## 2022-12-18 RX ORDER — AMOXICILLIN AND CLAVULANATE POTASSIUM 875; 125 MG/1; MG/1
1 TABLET, FILM COATED ORAL EVERY 12 HOURS
Qty: 10 TABLET | Refills: 0 | Status: SHIPPED | OUTPATIENT
Start: 2022-12-18 | End: 2022-12-23

## 2022-12-18 RX ORDER — DOXYCYCLINE 100 MG/1
100 TABLET ORAL EVERY 12 HOURS
Qty: 10 TABLET | Refills: 0 | Status: SHIPPED | OUTPATIENT
Start: 2022-12-18 | End: 2022-12-23

## 2022-12-18 RX ADMIN — AMOXICILLIN AND CLAVULANATE POTASSIUM 1 TABLET: 875; 125 TABLET, FILM COATED ORAL at 09:47

## 2022-12-18 RX ADMIN — Medication 5000 UNITS: at 06:18

## 2022-12-18 RX ADMIN — HYDROXYUREA 1500 MG: 500 CAPSULE ORAL at 06:19

## 2022-12-18 RX ADMIN — CYCLOBENZAPRINE 10 MG: 10 TABLET, FILM COATED ORAL at 13:50

## 2022-12-18 RX ADMIN — FOLIC ACID 1 MG: 1 TABLET ORAL at 06:17

## 2022-12-18 RX ADMIN — SODIUM CHLORIDE: 9 INJECTION, SOLUTION INTRAVENOUS at 00:21

## 2022-12-18 RX ADMIN — SENNOSIDES AND DOCUSATE SODIUM 2 TABLET: 50; 8.6 TABLET ORAL at 06:17

## 2022-12-18 RX ADMIN — DOXYCYCLINE 100 MG: 100 TABLET, FILM COATED ORAL at 06:19

## 2022-12-18 RX ADMIN — SODIUM CHLORIDE: 9 INJECTION, SOLUTION INTRAVENOUS at 09:51

## 2022-12-18 ASSESSMENT — FIBROSIS 4 INDEX: FIB4 SCORE: 0.57

## 2022-12-18 ASSESSMENT — PAIN DESCRIPTION - PAIN TYPE: TYPE: ACUTE PAIN

## 2022-12-18 NOTE — PROGRESS NOTES
Received bedside report from BRITTANY Jimenez, pt care assumed. VSS, pt assessment complete. Pt A&Ox4, Patient states her pain is stable at this time. POC discussed with pt and pt requested to leave facility, stating that she can manage her pain and continuation of care better at home. Mother at bedside agrees. Request for IV to be removed. Importance of maintaining IV access explained, pt and mother believe it is not neccessary. This RN stated that she will consult with provider about leaving facility.  Bed locked and in lowest position. call light within reach, hourly rounding initiated.

## 2022-12-18 NOTE — DISCHARGE SUMMARY
Discharge Summary    CHIEF COMPLAINT ON ADMISSION  Chief Complaint   Patient presents with    Sickle Cell Pain Crisis     PT presents d/t sickle cell pain crisis x 2 days. PT states she needs admission and blood transfusion.       Reason for Admission  other     Admission Date  12/17/2022    CODE STATUS  Full Code    HPI & HOSPITAL COURSE  This is a 22-year-old female medical history significant for sickle cell disease was admitted on 12/14/2022 for the management of sickle cell crisis  Her initial hemoglobin noted to be 7.2, she was having back pain and was started on narcotics and later Dilaudid PCA.  She was also started on antibiotics during the hospitalization as she had fever and increasing WBC.    Her hemoglobin progressively dropping from 7.2-6.4.  Dr. Pedroza had discussed with hematologist and recommendation was to transfuse 1 if less than 6.5 patient.  She reports he has some appointments the stated left AGAINST MEDICAL ADVICE.    Presented to ER on 12/17/2022 with a complaint of ongoing back pain; upon presentation her hemoglobin 5.5 WBC of 15.7, procalcitonin mildly elevated at 126.  Patient was started on broad-spectrum antibiotics including Unasyn and doxycycline.  Blood culture x2 obtained, no growth to date.    During the stay in the hospital, patient was started on aggressive IV fluid resuscitation along with pain management.  Today on 12/18/2022, patient is crying, yelling and was adamant about going home.  At this time patient will be discharged home on Augmentin and doxycycline.  She was recommended to follow-up with sickle cell patient as an outpatient.     Patient mother is noted to be at bedside, plan of care has been discussed with patient, nursing staff, case management    Therefore, she is discharged in fair and stable condition to home with close outpatient follow-up.      Discharge Date  12/18/2022    FOLLOW UP ITEMS POST DISCHARGE  Cullen Castro M.D.  Please follow-up with hematologist  Dr. Harley as an outpatient, referral has been sent.  Patient has last seen with Dr. Harley on 2021    DISCHARGE DIAGNOSES  Principal Problem:    Sickle cell crisis (HCC) POA: Yes  Active Problems:    Anxiety associated with depression POA: Yes    Mild persistent asthma without complication POA: Yes    Hypokalemia POA: Unknown    Sepsis due to pneumonia (HCC) POA: Unknown  Resolved Problems:    * No resolved hospital problems. *      FOLLOW UP  Future Appointments   Date Time Provider Department Center   12/20/2022  8:40 AM Cullen Castro M.D. [facility]   2/1/2023  1:40 PM Cammie Duval M.D. [facility] None     No follow-up provider specified.    MEDICATIONS ON DISCHARGE     Medication List        START taking these medications        Instructions   amoxicillin-clavulanate 875-125 MG Tabs  Commonly known as: AUGMENTIN   Take 1 Tablet by mouth every 12 hours for 5 days.  Dose: 1 Tablet     doxycycline monohydrate 100 MG tablet  Commonly known as: ADOXA   Take 1 Tablet by mouth every 12 hours for 5 days.  Dose: 100 mg     polyethylene glycol/lytes 17 g Pack  Commonly known as: MIRALAX   Take 1 Packet by mouth 1 time a day as needed (if sennosides and docusate ineffective after 24 hours).  Dose: 17 g     senna-docusate 8.6-50 MG Tabs  Commonly known as: PERICOLACE or SENOKOT S   Take 2 Tablets by mouth 2 times a day.  Dose: 2 Tablet            CONTINUE taking these medications        Instructions   albuterol 108 (90 Base) MCG/ACT Aers inhalation aerosol   Doctor's comments: Give albuterol that is patient or insurance preference please  Inhale 2 Puffs every 6 hours as needed for Shortness of Breath.  Dose: 2 Puff     fluticasone 44 MCG/ACT Aero  Commonly known as: FLOVENT HFA   Inhale 2 Puffs as needed (Per pt takes as needed for SOB).  Dose: 2 Puff     folic acid 1 MG Tabs  Commonly known as: FOLVITE   Take 1 Tab by mouth every day.  Dose: 1 mg     HYDROcodone-acetaminophen 5-325 MG Tabs per tablet  Commonly known  as: NORCO   Take 1 Tablet by mouth every 6 hours as needed. Indications: Pain  Dose: 1 Tablet     hydroxyurea 500 MG Caps  Commonly known as: HYDREA   Take 1,500 mg by mouth every day.  Dose: 1,500 mg     loratadine 10 MG Tabs  Commonly known as: CLARITIN   Take 10 mg by mouth as needed. Indications: Hayfever  Dose: 10 mg     penicillin v potassium 250 MG Tabs  Commonly known as: VEETID   Take 1 Tab by mouth 2 times a day.  Dose: 250 mg     vitamin D3 125 MCG (5000 UT) Caps   Take 5,000 Units by mouth every day.  Dose: 5,000 Units            STOP taking these medications      ibuprofen 200 MG Tabs  Commonly known as: MOTRIN              Allergies  Allergies   Allergen Reactions    Haloperidol Unspecified     Received Haldol x 2 doses in [facility] and developed an acute dystonic reaction, treated with diphenhydramine       DIET  Orders Placed This Encounter   Procedures    Diet Order Diet: Regular     Standing Status:   Standing     Number of Occurrences:   1     Order Specific Question:   Diet:     Answer:   Regular [1]       ACTIVITY  As tolerated.  Weight bearing as tolerated    CONSULTATIONS  None    PROCEDURES  none3    LABORATORY  Lab Results   Component Value Date    SODIUM 135 12/18/2022    POTASSIUM 3.7 12/18/2022    CHLORIDE 103 12/18/2022    CO2 24 12/18/2022    GLUCOSE 89 12/18/2022    BUN 5 (L) 12/18/2022    CREATININE 0.34 (L) 12/18/2022        Lab Results   Component Value Date    WBC 14.1 (H) 12/18/2022    HEMOGLOBIN 7.9 (L) 12/18/2022    HEMATOCRIT 21.7 (L) 12/18/2022    PLATELETCT 247 12/18/2022        Total time of the discharge process exceeds 35 minutes.

## 2022-12-18 NOTE — CARE PLAN
The patient is Stable - Low risk of patient condition declining or worsening    Shift Goals  Clinical Goals: Maintain Hgb, pain control, trend labs  Patient Goals: D/C    Progress made toward(s) clinical / shift goals:    Problem: Knowledge Deficit - Standard  Goal: Patient and family/care givers will demonstrate understanding of plan of care, disease process/condition, diagnostic tests and medications  Outcome: Progressing     Problem: Pain - Standard  Goal: Alleviation of pain or a reduction in pain to the patient’s comfort goal  Outcome: Progressing

## 2022-12-18 NOTE — PROGRESS NOTES
2000 Pt upset about not being able to leave. Pt and mother educated about plan of care. Pt does not agree and verbalized ability to control pain and continue care more effectively at home. Provider paged.     1585 Provider visited pt who confirmed with nurse removal of one IV. D/C PCA per pt request, with anticipatory d/c in the AM.     2230 Pt agreeable to take PRN medication.    2330 Discussed plan of care with pt and mother again.     12-18 0100 Pt requesting PCA pump to be restarted.

## 2022-12-19 LAB
BACTERIA BLD CULT: NORMAL
BACTERIA BLD CULT: NORMAL
SIGNIFICANT IND 70042: NORMAL
SIGNIFICANT IND 70042: NORMAL
SITE SITE: NORMAL
SITE SITE: NORMAL
SOURCE SOURCE: NORMAL
SOURCE SOURCE: NORMAL

## 2022-12-19 NOTE — PROGRESS NOTES
Tele Shift Summary     Rhythm: NSR  Rate: 70-80s  Ectopy: None reported   Measurements: /0.16/0.08/0.40/

## 2023-02-02 ENCOUNTER — TELEPHONE (OUTPATIENT)
Dept: SLEEP MEDICINE | Facility: MEDICAL CENTER | Age: 23
End: 2023-02-02
Payer: MEDICAID

## 2023-02-02 NOTE — TELEPHONE ENCOUNTER
02-02-23  1st NO SHOW  Date of No Show: 02-01-23  Provider: Mukund  Reason For Visit: FV/ 2 mos  Outcome of call:    Appt rescheduled with patient   Reason Missed: Forgot   ACM

## 2023-03-01 ENCOUNTER — APPOINTMENT (OUTPATIENT)
Dept: SLEEP MEDICINE | Facility: MEDICAL CENTER | Age: 23
End: 2023-03-01
Attending: PREVENTIVE MEDICINE
Payer: MEDICAID

## 2023-04-11 ENCOUNTER — APPOINTMENT (OUTPATIENT)
Dept: RADIOLOGY | Facility: MEDICAL CENTER | Age: 23
End: 2023-04-11
Attending: STUDENT IN AN ORGANIZED HEALTH CARE EDUCATION/TRAINING PROGRAM
Payer: MEDICAID

## 2023-04-11 ENCOUNTER — HOSPITAL ENCOUNTER (EMERGENCY)
Facility: MEDICAL CENTER | Age: 23
End: 2023-04-11
Attending: STUDENT IN AN ORGANIZED HEALTH CARE EDUCATION/TRAINING PROGRAM
Payer: MEDICAID

## 2023-04-11 VITALS
BODY MASS INDEX: 22.52 KG/M2 | TEMPERATURE: 98.6 F | OXYGEN SATURATION: 97 % | SYSTOLIC BLOOD PRESSURE: 108 MMHG | WEIGHT: 122.36 LBS | HEART RATE: 70 BPM | HEIGHT: 62 IN | RESPIRATION RATE: 17 BRPM | DIASTOLIC BLOOD PRESSURE: 66 MMHG

## 2023-04-11 DIAGNOSIS — D64.9 ANEMIA, UNSPECIFIED TYPE: ICD-10-CM

## 2023-04-11 DIAGNOSIS — D57.00 SICKLE CELL CRISIS (HCC): ICD-10-CM

## 2023-04-11 DIAGNOSIS — D57.00 SICKLE CELL PAIN CRISIS (HCC): ICD-10-CM

## 2023-04-11 LAB
ALBUMIN SERPL BCP-MCNC: 4.2 G/DL (ref 3.2–4.9)
ALBUMIN/GLOB SERPL: 1.4 G/DL
ALP SERPL-CCNC: 74 U/L (ref 30–99)
ALT SERPL-CCNC: 19 U/L (ref 2–50)
ANION GAP SERPL CALC-SCNC: 9 MMOL/L (ref 7–16)
ANISOCYTOSIS BLD QL SMEAR: ABNORMAL
AST SERPL-CCNC: 37 U/L (ref 12–45)
BASOPHILS # BLD AUTO: 0 % (ref 0–1.8)
BASOPHILS # BLD: 0 K/UL (ref 0–0.12)
BILIRUB SERPL-MCNC: 1.7 MG/DL (ref 0.1–1.5)
BUN SERPL-MCNC: 6 MG/DL (ref 8–22)
CALCIUM ALBUM COR SERPL-MCNC: 8.8 MG/DL (ref 8.5–10.5)
CALCIUM SERPL-MCNC: 9 MG/DL (ref 8.4–10.2)
CHLORIDE SERPL-SCNC: 104 MMOL/L (ref 96–112)
CO2 SERPL-SCNC: 24 MMOL/L (ref 20–33)
CREAT SERPL-MCNC: 0.39 MG/DL (ref 0.5–1.4)
EKG IMPRESSION: NORMAL
EOSINOPHIL # BLD AUTO: 0.15 K/UL (ref 0–0.51)
EOSINOPHIL NFR BLD: 1 % (ref 0–6.9)
ERYTHROCYTE [DISTWIDTH] IN BLOOD BY AUTOMATED COUNT: 61 FL (ref 35.9–50)
GFR SERPLBLD CREATININE-BSD FMLA CKD-EPI: 145 ML/MIN/1.73 M 2
GLOBULIN SER CALC-MCNC: 3.1 G/DL (ref 1.9–3.5)
GLUCOSE SERPL-MCNC: 94 MG/DL (ref 65–99)
HCG SERPL QL: NEGATIVE
HCT VFR BLD AUTO: 23.3 % (ref 37–47)
HGB BLD-MCNC: 8.2 G/DL (ref 12–16)
HGB RETIC QN AUTO: 34.7 PG/CELL (ref 29–35)
IMM RETICS NFR: 27.3 % (ref 9.3–17.4)
LYMPHOCYTES # BLD AUTO: 1.79 K/UL (ref 1–4.8)
LYMPHOCYTES NFR BLD: 12 % (ref 22–41)
MACROCYTES BLD QL SMEAR: ABNORMAL
MANUAL DIFF BLD: NORMAL
MCH RBC QN AUTO: 34.6 PG (ref 27–33)
MCHC RBC AUTO-ENTMCNC: 35.2 G/DL (ref 33.6–35)
MCV RBC AUTO: 98.3 FL (ref 81.4–97.8)
MONOCYTES # BLD AUTO: 1.64 K/UL (ref 0–0.85)
MONOCYTES NFR BLD AUTO: 11 % (ref 0–13.4)
NEUTROPHILS # BLD AUTO: 11.32 K/UL (ref 2–7.15)
NEUTROPHILS NFR BLD: 73 % (ref 44–72)
NEUTS BAND NFR BLD MANUAL: 3 % (ref 0–10)
NRBC # BLD AUTO: 0.22 K/UL
NRBC BLD-RTO: 1.5 /100 WBC
OVALOCYTES BLD QL SMEAR: NORMAL
PLATELET # BLD AUTO: 275 K/UL (ref 164–446)
PLATELET BLD QL SMEAR: NORMAL
PMV BLD AUTO: 9.1 FL (ref 9–12.9)
POIKILOCYTOSIS BLD QL SMEAR: NORMAL
POLYCHROMASIA BLD QL SMEAR: NORMAL
POTASSIUM SERPL-SCNC: 5 MMOL/L (ref 3.6–5.5)
PROT SERPL-MCNC: 7.3 G/DL (ref 6–8.2)
RBC # BLD AUTO: 2.37 M/UL (ref 4.2–5.4)
RBC BLD AUTO: PRESENT
RETICS # AUTO: 0.41 M/UL (ref 0.04–0.06)
RETICS/RBC NFR: 16.2 % (ref 0.8–2.1)
SICKLE CELLS BLD QL SMEAR: NORMAL
SODIUM SERPL-SCNC: 137 MMOL/L (ref 135–145)
TARGETS BLD QL SMEAR: NORMAL
WBC # BLD AUTO: 14.9 K/UL (ref 4.8–10.8)

## 2023-04-11 PROCEDURE — 96374 THER/PROPH/DIAG INJ IV PUSH: CPT

## 2023-04-11 PROCEDURE — 84703 CHORIONIC GONADOTROPIN ASSAY: CPT

## 2023-04-11 PROCEDURE — 700111 HCHG RX REV CODE 636 W/ 250 OVERRIDE (IP): Performed by: STUDENT IN AN ORGANIZED HEALTH CARE EDUCATION/TRAINING PROGRAM

## 2023-04-11 PROCEDURE — 71045 X-RAY EXAM CHEST 1 VIEW: CPT

## 2023-04-11 PROCEDURE — 85046 RETICYTE/HGB CONCENTRATE: CPT

## 2023-04-11 PROCEDURE — 80053 COMPREHEN METABOLIC PANEL: CPT

## 2023-04-11 PROCEDURE — 85025 COMPLETE CBC W/AUTO DIFF WBC: CPT

## 2023-04-11 PROCEDURE — 99284 EMERGENCY DEPT VISIT MOD MDM: CPT

## 2023-04-11 PROCEDURE — 700105 HCHG RX REV CODE 258: Performed by: STUDENT IN AN ORGANIZED HEALTH CARE EDUCATION/TRAINING PROGRAM

## 2023-04-11 PROCEDURE — 36415 COLL VENOUS BLD VENIPUNCTURE: CPT

## 2023-04-11 PROCEDURE — 85007 BL SMEAR W/DIFF WBC COUNT: CPT

## 2023-04-11 PROCEDURE — 93005 ELECTROCARDIOGRAM TRACING: CPT | Performed by: STUDENT IN AN ORGANIZED HEALTH CARE EDUCATION/TRAINING PROGRAM

## 2023-04-11 PROCEDURE — 96375 TX/PRO/DX INJ NEW DRUG ADDON: CPT

## 2023-04-11 RX ORDER — KETOROLAC TROMETHAMINE 30 MG/ML
15 INJECTION, SOLUTION INTRAMUSCULAR; INTRAVENOUS ONCE
Status: COMPLETED | OUTPATIENT
Start: 2023-04-11 | End: 2023-04-11

## 2023-04-11 RX ORDER — HYDROMORPHONE HYDROCHLORIDE 1 MG/ML
1 INJECTION, SOLUTION INTRAMUSCULAR; INTRAVENOUS; SUBCUTANEOUS ONCE
Status: COMPLETED | OUTPATIENT
Start: 2023-04-11 | End: 2023-04-11

## 2023-04-11 RX ORDER — HYDROCODONE BITARTRATE AND ACETAMINOPHEN 5; 325 MG/1; MG/1
1 TABLET ORAL EVERY 6 HOURS PRN
Qty: 12 TABLET | Refills: 0 | Status: SHIPPED | OUTPATIENT
Start: 2023-04-11 | End: 2023-04-14

## 2023-04-11 RX ORDER — SODIUM CHLORIDE 9 MG/ML
1000 INJECTION, SOLUTION INTRAVENOUS ONCE
Status: COMPLETED | OUTPATIENT
Start: 2023-04-11 | End: 2023-04-11

## 2023-04-11 RX ORDER — LIDOCAINE 4 G/G
1 PATCH TOPICAL EVERY 12 HOURS
Qty: 1 PATCH | Refills: 0 | Status: SHIPPED | OUTPATIENT
Start: 2023-04-11 | End: 2023-11-30

## 2023-04-11 RX ORDER — IBUPROFEN 600 MG/1
600 TABLET ORAL EVERY 6 HOURS PRN
Qty: 30 TABLET | Refills: 0 | Status: SHIPPED | OUTPATIENT
Start: 2023-04-11

## 2023-04-11 RX ADMIN — HYDROMORPHONE HYDROCHLORIDE 1 MG: 1 INJECTION, SOLUTION INTRAMUSCULAR; INTRAVENOUS; SUBCUTANEOUS at 04:15

## 2023-04-11 RX ADMIN — SODIUM CHLORIDE 1000 ML: 9 INJECTION, SOLUTION INTRAVENOUS at 04:09

## 2023-04-11 RX ADMIN — FENTANYL CITRATE 50 MCG: 50 INJECTION, SOLUTION INTRAMUSCULAR; INTRAVENOUS at 05:56

## 2023-04-11 RX ADMIN — KETOROLAC TROMETHAMINE 15 MG: 30 INJECTION, SOLUTION INTRAMUSCULAR at 04:48

## 2023-04-11 ASSESSMENT — FIBROSIS 4 INDEX: FIB4 SCORE: 0.57

## 2023-04-11 ASSESSMENT — PAIN DESCRIPTION - PAIN TYPE: TYPE: CHRONIC PAIN

## 2023-04-11 NOTE — ED NOTES
Pt. Verbalizes understanding of discharge instructions. Accompanied to lobby with parent. Pt. Alert/awake in NAD.   All questions answered and understood. Advised to  ff-up with PCP.

## 2023-04-11 NOTE — DISCHARGE INSTRUCTIONS
If you develop any fevers difficulty breathing uncontrolled pain please return to the ER.  Stay well-hydrated, continue Tylenol ibuprofen as needed at home and use the Norco as needed for breakthrough pain return with other concerns follow-up with your hematologist as soon as rudy

## 2023-04-11 NOTE — ED PROVIDER NOTES
ED Provider Note    CHIEF COMPLAINT  Chief Complaint   Patient presents with    Rib Pain     Patient states that she has rib pain on both sides started last Easter. No hx trauma or fall.    Shortness of Breath    Nausea       EXTERNAL RECORDS REVIEWED  Inpatient Notes    Discharge summary from December 17, 2022 patient has a history of sickle cell disease was admitted for sickle cell pain crisis as well as anemia requiring transfusion.      HPI/ROS  LIMITATION TO HISTORY   Select: : None  OUTSIDE HISTORIAN(S):  Parent reports that the patient has had pain for the past day.    Henry Anand is a 22 y.o. female who presents evaluation of bilateral rib and back pain.  Pain started on Sunday, patient states that she was sleeping and woke up had an ache in her back felt like she slept funny, was worse with movement better with rest began to radiate into her ribs.  She tried taking Tylenol, Aleve as well as her at home Norco without relief of her symptoms.  Prompted her to come to the ER this evening.  No associated shortness of breath fevers chest pain abdominal pain nausea vomiting dysuria increased urinary frequency urgency denies any trauma falls saddle anesthesias loss of bowel or bladder control's.  She has been compliant with her hydroxyurea.  States she has not had a sickle cell pain crisis since her previous admission  In December.    PAST MEDICAL HISTORY   has a past medical history of Asthma (2021), Gallstones with biliary obstruction (11/2014), Heart murmur, and Sickle cell anemia without crisis (HCC) (2001).    SURGICAL HISTORY   has a past surgical history that includes cholecystectomy (11/3/2014); ercp (N/A, 10/30/2014); other (2018); septoturbinoplasty (Bilateral, 5/4/2021); antrostomy (5/4/2021); and ethmoidectomy, endoscopic (5/4/2021).    FAMILY HISTORY  Family History   Problem Relation Age of Onset    Stroke Sister     Anemia Sister         Sickle cell    Sleep Apnea Mother      "Sleep Apnea Father     Sleep Apnea Brother        SOCIAL HISTORY  Social History     Tobacco Use    Smoking status: Never    Smokeless tobacco: Never   Vaping Use    Vaping Use: Never used   Substance and Sexual Activity    Alcohol use: Not Currently    Drug use: Yes     Types: Inhaled     Comment: job , 2 joints    Sexual activity: Not on file       CURRENT MEDICATIONS  Home Medications       Reviewed by Olvin Stephens R.N. (Registered Nurse) on 04/11/23 at 0323  Med List Status: Complete     Medication Last Dose Status   albuterol 108 (90 Base) MCG/ACT Aero Soln inhalation aerosol  Active   Cholecalciferol (VITAMIN D3) 125 MCG (5000 UT) Cap 4/10/2023 Active   fluticasone (FLOVENT HFA) 44 MCG/ACT Aerosol  Active   folic acid (FOLVITE) 1 MG Tab 4/10/2023 Active   HYDROcodone-acetaminophen (NORCO) 5-325 MG Tab per tablet 4/11/2023 Active   hydroxyurea (HYDREA) 500 MG Cap 4/10/2023 Active   loratadine (CLARITIN) 10 MG Tab  Active   penicillin v potassium (VEETID) 250 MG Tab 4/10/2023 Active   polyethylene glycol/lytes (MIRALAX) 17 g Pack  Active   senna-docusate (PERICOLACE OR SENOKOT S) 8.6-50 MG Tab  Active                    ALLERGIES  Allergies   Allergen Reactions    Haloperidol Unspecified     Received Haldol x 2 doses in [facility] P[facility] and developed an acute dystonic reaction, treated with diphenhydramine       PHYSICAL EXAM  VITAL SIGNS: /66   Pulse 70   Temp 37 °C (98.6 °F) (Temporal)   Resp 17   Ht 1.575 m (5' 2\")   Wt 55.5 kg (122 lb 5.7 oz)   SpO2 97%   BMI 22.38 kg/m²    Pulse ox interpretation: I interpret this pulse ox as normal.  VITALS - vital signs documented prior to this note have been reviewed and noted,  GENERAL - awake, alert, oriented, GCS 15, no apparent distress, non-toxic  appearing  HEENT - normocephalic, atraumatic, pupils equal, sclera anicteric, mucus  membranes moist  NECK - supple, no meningismus, full active range of motion, trachea midline  CARDIOVASCULAR - regular " rate/rhythm, no murmurs/gallops/rubs  PULMONARY - no respiratory distress, speaking in full sentences, clear to  auscultation bilaterally, no wheezing/ronchi/rales, no accessory muscle use  GASTROINTESTINAL - soft, non-tender, non-distended, no rebound, guarding,  or peritonitis   back: Spasm of her thoracic paraspinal musculature no midline bony tenderness no overlying rashes or contusions  GENITOURINARY - Deferred  NEUROLOGIC - Awake alert, normal mental status, speech fluid, cognition  normal, moves all extremities  MUSCULOSKELETAL - no obvious asymmetry or deformities present  EXTREMITIES - warm, well-perfused, no cyanosis or significant edema  DERMATOLOGIC - warm, dry, no rashes, no jaundice  PSYCHIATRIC - normal affect, normal insight, normal concentration    DIAGNOSTIC STUDIES / PROCEDURES  EKG  I have independently interpreted this EKG  EKG shows a sinus rhythm at a rate of 65, no nonspecific T wave inversions no STEMI pattern.  Otherwise normal VA QRS QTc intervals    LABS  Labs Reviewed   CBC WITH DIFFERENTIAL - Abnormal; Notable for the following components:       Result Value    WBC 14.9 (*)     RBC 2.37 (*)     Hemoglobin 8.2 (*)     Hematocrit 23.3 (*)     MCV 98.3 (*)     MCH 34.6 (*)     MCHC 35.2 (*)     RDW 61.0 (*)     Neutrophils-Polys 73.00 (*)     Lymphocytes 12.00 (*)     Neutrophils (Absolute) 11.32 (*)     Monos (Absolute) 1.64 (*)     All other components within normal limits   COMP METABOLIC PANEL - Abnormal; Notable for the following components:    Bun 6 (*)     Creatinine 0.39 (*)     Total Bilirubin 1.7 (*)     All other components within normal limits   RETICULOCYTES COUNT - Abnormal; Notable for the following components:    Reticulocyte Count 16.2 (*)     Retic, Absolute 0.41 (*)     Imm. Reticulocyte Fraction 27.3 (*)     All other components within normal limits   HCG QUAL SERUM   CORRECTED CALCIUM   ESTIMATED GFR   DIFFERENTIAL MANUAL   PLATELET ESTIMATE   MORPHOLOGY         RADIOLOGY  I have independently interpreted the diagnostic imaging associated with this visit and am waiting the final reading from the radiologist.   My preliminary interpretation is as follows: No evidence of acute chest  Radiologist interpretation:   DX-CHEST-PORTABLE (1 VIEW)   Final Result         1.  No acute cardiopulmonary disease.   2.  Left lower lobe nodular density, location and appearance favors nipple shadow. Could be further evaluated with repeat chest x-ray with nipple markers to definitively exclude pulmonary nodule.           COURSE & MEDICAL DECISION MAKING    ED Observation Status? Yes; I am placing the patient in to an observation status due to a diagnostic uncertainty as well as therapeutic intensity. Patient placed in observation status at 3:45 AM, 4/11/2023.     Observation plan is as follows: Evaluation for pain control labs to evaluate for aplastic crisis chest x-ray to evaluate for acute chest.    Upon Reevaluation, the patient's condition has: Improved; and will be discharged.    Patient discharged from ED Observation status at 0634 (Time) 4/11 (Date).     INITIAL ASSESSMENT, COURSE AND PLAN  Care Narrative: Patient presented for evaluation of back and rib pain.  Differential included was not limited to sickle cell pain crisis, musculoskeletal strain, musculoskeletal spasm, pneumonia acute chest anemia among many other considerations.  An IV was established, patient was bolused with IV fluids given her history of sickle cell disease, and she was treated with Dilaudid and Toradol.  Labs did show a hypochromic macrocytic anemia, and a leukocytosis.  She has no infectious complaints has had no measurable fevers at home and has no other sirs criteria do not believe her to be septic at this time thus will withhold antibiotics. Review of previous labs do reveal this is near the patient's baseline.  Chest x-ray was obtained which demonstrates no evidence of pneumonia or acute chest.  Her  reticulocyte count is up, and this does not seem consistent with an acute pain crisis.  She was treated with Toradol Dilaudid as well as a dose of fentanyl.  I discussed and offered admission for further care and evaluation of suspected acute pain sickle cell pain crisis however the patient declined.  Shared decision-making conversation was had with the patient as well as her mother and she preferred to go home and return with any other worsening symptoms.  Recommended she follow-up with her hematologist as soon as possible return with any fevers, chest pain shortness of breath or uncontrolled pain.  She states she is almost out of her norco at home review of the  reveals her last Norco prescription was in December thus she will be provided a 3-day course of Norco and instructed to follow-up with her hematology team for further pain medications.  All pertinent return precautions were discussed with the patient, and they expressed understanding.  Patient was discharged in a stable condition          HYDRATION: Based on the patient's presentation of Dehydration the patient was given IV fluids. IV Hydration was used because oral hydration was not adequate alone. Upon recheck following hydration, the patient was improved.      ADDITIONAL PROBLEM LIST  #1 leukocytosis  #2 sickle cell pain crisis  3.  Anemia    DISPOSITION AND DISCUSSIONS  I have discussed management of the patient with the following physicians and GABE's:  none    Discussion of management with other [facility] or appropriate source(s): None     Escalation of care considered, and ultimately not performed:after discussion with the patient / family, they have elected to decline an escalation in care    Barriers to care at this time, including but not limited to:  none .     Decision tools and prescription drugs considered including, but not limited to: Pain Medications norco .    FINAL DIAGNOSIS  1. Sickle cell pain crisis (HCC)    2. Anemia, unspecified type     3 leukocytosis         Electronically signed by: Cr Duque D.O., 4/11/2023 4:45 AM

## 2023-04-11 NOTE — ED TRIAGE NOTES
".  Chief Complaint   Patient presents with    Rib Pain     Patient states that she has rib pain on both sides started last Easter. No hx trauma or fall.    Shortness of Breath    Nausea     ./59   Pulse 80   Temp 37.2 °C (99 °F) (Temporal)   Resp 18   Ht 1.575 m (5' 2\")   Wt 55.5 kg (122 lb 5.7 oz)   SpO2 97%   BMI 22.38 kg/m²     "

## 2023-05-25 ENCOUNTER — HOSPITAL ENCOUNTER (EMERGENCY)
Facility: MEDICAL CENTER | Age: 23
End: 2023-05-25
Attending: STUDENT IN AN ORGANIZED HEALTH CARE EDUCATION/TRAINING PROGRAM
Payer: MEDICAID

## 2023-05-25 ENCOUNTER — APPOINTMENT (OUTPATIENT)
Dept: RADIOLOGY | Facility: MEDICAL CENTER | Age: 23
End: 2023-05-25
Attending: STUDENT IN AN ORGANIZED HEALTH CARE EDUCATION/TRAINING PROGRAM
Payer: MEDICAID

## 2023-05-25 VITALS
TEMPERATURE: 98.2 F | HEIGHT: 62 IN | BODY MASS INDEX: 22.43 KG/M2 | RESPIRATION RATE: 14 BRPM | HEART RATE: 66 BPM | DIASTOLIC BLOOD PRESSURE: 42 MMHG | WEIGHT: 121.91 LBS | OXYGEN SATURATION: 94 % | SYSTOLIC BLOOD PRESSURE: 91 MMHG

## 2023-05-25 DIAGNOSIS — M79.644 PAIN OF FINGER OF RIGHT HAND: ICD-10-CM

## 2023-05-25 PROCEDURE — 99284 EMERGENCY DEPT VISIT MOD MDM: CPT

## 2023-05-25 PROCEDURE — 90715 TDAP VACCINE 7 YRS/> IM: CPT | Performed by: STUDENT IN AN ORGANIZED HEALTH CARE EDUCATION/TRAINING PROGRAM

## 2023-05-25 PROCEDURE — 90471 IMMUNIZATION ADMIN: CPT

## 2023-05-25 PROCEDURE — 700102 HCHG RX REV CODE 250 W/ 637 OVERRIDE(OP): Performed by: STUDENT IN AN ORGANIZED HEALTH CARE EDUCATION/TRAINING PROGRAM

## 2023-05-25 PROCEDURE — 73140 X-RAY EXAM OF FINGER(S): CPT | Mod: RT

## 2023-05-25 PROCEDURE — A9270 NON-COVERED ITEM OR SERVICE: HCPCS | Performed by: STUDENT IN AN ORGANIZED HEALTH CARE EDUCATION/TRAINING PROGRAM

## 2023-05-25 PROCEDURE — 700111 HCHG RX REV CODE 636 W/ 250 OVERRIDE (IP): Performed by: STUDENT IN AN ORGANIZED HEALTH CARE EDUCATION/TRAINING PROGRAM

## 2023-05-25 RX ORDER — ACETAMINOPHEN 325 MG/1
650 TABLET ORAL ONCE
Status: COMPLETED | OUTPATIENT
Start: 2023-05-25 | End: 2023-05-25

## 2023-05-25 RX ORDER — IBUPROFEN 600 MG/1
600 TABLET ORAL ONCE
Status: COMPLETED | OUTPATIENT
Start: 2023-05-25 | End: 2023-05-25

## 2023-05-25 RX ADMIN — CLOSTRIDIUM TETANI TOXOID ANTIGEN (FORMALDEHYDE INACTIVATED), CORYNEBACTERIUM DIPHTHERIAE TOXOID ANTIGEN (FORMALDEHYDE INACTIVATED), BORDETELLA PERTUSSIS TOXOID ANTIGEN (GLUTARALDEHYDE INACTIVATED), BORDETELLA PERTUSSIS FILAMENTOUS HEMAGGLUTININ ANTIGEN (FORMALDEHYDE INACTIVATED), BORDETELLA PERTUSSIS PERTACTIN ANTIGEN, AND BORDETELLA PERTUSSIS FIMBRIAE 2/3 ANTIGEN 0.5 ML: 5; 2; 2.5; 5; 3; 5 INJECTION, SUSPENSION INTRAMUSCULAR at 02:50

## 2023-05-25 RX ADMIN — ACETAMINOPHEN 650 MG: 325 TABLET, FILM COATED ORAL at 02:39

## 2023-05-25 RX ADMIN — IBUPROFEN 600 MG: 600 TABLET, FILM COATED ORAL at 02:39

## 2023-05-25 ASSESSMENT — FIBROSIS 4 INDEX: FIB4 SCORE: 0.65

## 2023-05-25 NOTE — ED NOTES
Vital signs taken and recorded. Discharge in stable condition ambulatory accompanied by mother. Health teachings given to patient and family with full understanding of the information given. No personal belongings left.

## 2023-05-25 NOTE — DISCHARGE INSTRUCTIONS
Please alternate as needed between ibuprofen and tylenol for pain.  You should continue to ice as needed

## 2023-05-25 NOTE — ED PROVIDER NOTES
ED Provider Note    CHIEF COMPLAINT  Chief Complaint   Patient presents with    Digit Pain     Pt reports pain on her right second digit due to trauma. She states her finger got caught in between the car door as she closed it last Tuesday morning.        EXTERNAL RECORDS REVIEWED  Other patient seen in the ER on 11 April for sickle cell pain crisis    HPI/ROS  LIMITATION TO HISTORY   Select: : None  OUTSIDE HISTORIAN(S):  Mom, included below    Henry Anand is a 22 y.o. female who presents with right second digit pain.  Patient states that on Tuesday she accidentally slammed her finger in the car door.  She says she is having throbbing pain in her finger with associated tingling to the most distal aspect of the finger.  She has pain when she bends the finger as well.  She denies any damage to the nail.  She has not taken anything for the pain. She is right handed      PAST MEDICAL HISTORY   has a past medical history of Asthma (2021), Gallstones with biliary obstruction (11/2014), Heart murmur, and Sickle cell anemia without crisis (HCC) (2001).    SURGICAL HISTORY   has a past surgical history that includes cholecystectomy (11/3/2014); ercp (N/A, 10/30/2014); other (2018); septoturbinoplasty (Bilateral, 5/4/2021); antrostomy (5/4/2021); and ethmoidectomy, endoscopic (5/4/2021).    FAMILY HISTORY  Family History   Problem Relation Age of Onset    Stroke Sister     Anemia Sister         Sickle cell    Sleep Apnea Mother     Sleep Apnea Father     Sleep Apnea Brother        SOCIAL HISTORY  Social History     Tobacco Use    Smoking status: Never    Smokeless tobacco: Never   Vaping Use    Vaping Use: Never used   Substance and Sexual Activity    Alcohol use: Not Currently    Drug use: Yes     Types: Inhaled     Comment: job , 2 joints    Sexual activity: Not on file       CURRENT MEDICATIONS  Home Medications       Reviewed by Paige Wade R.N. (Registered Nurse) on 05/25/23 at  "0228  Med List Status: Partial     Medication Last Dose Status   albuterol 108 (90 Base) MCG/ACT Aero Soln inhalation aerosol  Active   Cholecalciferol (VITAMIN D3) 125 MCG (5000 UT) Cap  Active   fluticasone (FLOVENT HFA) 44 MCG/ACT Aerosol  Active   folic acid (FOLVITE) 1 MG Tab  Active   HYDROcodone-acetaminophen (NORCO) 5-325 MG Tab per tablet  Active   hydroxyurea (HYDREA) 500 MG Cap  Active   ibuprofen (MOTRIN) 600 MG Tab  Active   Lidocaine 4 % Patch  Active   loratadine (CLARITIN) 10 MG Tab  Active   penicillin v potassium (VEETID) 250 MG Tab  Active   polyethylene glycol/lytes (MIRALAX) 17 g Pack  Active   senna-docusate (PERICOLACE OR SENOKOT S) 8.6-50 MG Tab  Active                    ALLERGIES  Allergies   Allergen Reactions    Haloperidol Unspecified     Received Haldol x 2 doses in [facility] P[facility] and developed an acute dystonic reaction, treated with diphenhydramine       PHYSICAL EXAM  VITAL SIGNS: /51   Pulse 65   Temp 36.8 °C (98.2 °F) (Temporal)   Resp 14   Ht 1.575 m (5' 2\")   Wt 55.3 kg (121 lb 14.6 oz)   LMP 05/22/2023 (Exact Date)   SpO2 98%   BMI 22.30 kg/m²    Constitutional: Awake and alert. Nontoxic  HENT:  Grossly normal  Eyes: Grossly normal  Neck: Normal range of motion  Cardiovascular: Normal heart rate   Thorax & Lungs: No respiratory distress  Abdomen: Nontender  Skin:  No pathologic rash.   Extremities:   There is no deformity to right second digit. She has tenderness to palpation of the distal aspect of the right second digit over the DIP.  There is approximately half a centimeter area of broken skin without surrounding erythema, warmth or drainage. No exposure of underlying tendon or soft tissue.  She has full range of motion of the MCP, PIP and DIP of the affected digit.  She has normal sensation to light touch of the affected digit.  No evidence of damage to the nail  Psychiatric: Affect normal      DIAGNOSTIC STUDIES / PROCEDURES      RADIOLOGY  I have independently " interpreted the diagnostic imaging associated with this visit and am waiting the final reading from the radiologist.   My preliminary interpretation is as follows: No obvious fracture  Radiologist interpretation:   DX-FINGER(S) 2+ RIGHT   Final Result         1.  No acute traumatic bony injury identified            COURSE & MEDICAL DECISION MAKING    ED Observation Status? No; Patient does not meet criteria for ED Observation.     INITIAL ASSESSMENT, COURSE AND PLAN  Care Narrative: 22-year-old who presents with finger pain after slamming it in a car door two days ago. She has no evidence of deformity on exam.  XR normal without any evidence of fracture.  There is no evidence of damage to the underlying nailbed.  There is a very small laceration which is almost healed entirely at this point.  Given that the injury was over 24 hours ago , I will let it continue to heal by secondary intention and think the risks of primary closure outweigh the benefits.  Her tetanus shot was updated.  She is neurovascularly intact.  No evidence of underlying tendon injury.  She was given ibuprofen and Tylenol for pain.  I advised ibuprofen and Tylenol as needed for pain. A finger splint was provided for comfort.            DISPOSITION AND DISCUSSIONS  I have discussed management of the patient with the following physicians and GABE's:  None    Discussion of management with other [facility] or appropriate source(s): None       Barriers to care at this time, including but not limited to:  None .     Decision tools and prescription drugs considered including, but not limited to: Pain Medications Ibuprofen and Tylenol .    FINAL DIAGNOSIS  1. Pain of finger of right hand Acute          Electronically signed by: Dulce Maria Duenas M.D., 5/25/2023 2:26 AM

## 2023-05-25 NOTE — ED TRIAGE NOTES
"Chief Complaint   Patient presents with    Digit Pain     Pt reports pain on her right second digit due to trauma. She states her finger got caught in between the car door as she closed it last Tuesday morning.      /51   Pulse 65   Temp 36.8 °C (98.2 °F) (Temporal)   Resp 14   Ht 1.575 m (5' 2\")   Wt 55.3 kg (121 lb 14.6 oz)   LMP 05/22/2023 (Exact Date)   SpO2 98%   BMI 22.30 kg/m²     "

## 2023-10-07 ENCOUNTER — HOSPITAL ENCOUNTER (EMERGENCY)
Facility: MEDICAL CENTER | Age: 23
End: 2023-10-07
Attending: EMERGENCY MEDICINE
Payer: MEDICAID

## 2023-10-07 VITALS
OXYGEN SATURATION: 93 % | WEIGHT: 117.28 LBS | SYSTOLIC BLOOD PRESSURE: 101 MMHG | HEART RATE: 82 BPM | DIASTOLIC BLOOD PRESSURE: 54 MMHG | HEIGHT: 70 IN | RESPIRATION RATE: 20 BRPM | BODY MASS INDEX: 16.79 KG/M2 | TEMPERATURE: 99.3 F
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
## 2023-10-07 DIAGNOSIS — Z29.81 ENCOUNTER FOR HIV PRE-EXPOSURE PROPHYLAXIS: ICD-10-CM

## 2023-10-07 DIAGNOSIS — D57.1 SICKLE CELL DISEASE WITHOUT CRISIS (HCC): ICD-10-CM

## 2023-10-07 DIAGNOSIS — S61.259A HUMAN BITE OF FINGER, INITIAL ENCOUNTER: Primary | ICD-10-CM

## 2023-10-07 LAB
GFR SERPLBLD CREATININE-BSD FMLA CKD-EPI: 137 ML/MIN/1.73 M 2
HCG SERPL QL: NEGATIVE

## 2023-10-07 PROCEDURE — 80053 COMPREHEN METABOLIC PANEL: CPT

## 2023-10-07 PROCEDURE — 36415 COLL VENOUS BLD VENIPUNCTURE: CPT

## 2023-10-07 PROCEDURE — 99283 EMERGENCY DEPT VISIT LOW MDM: CPT

## 2023-10-07 PROCEDURE — 700102 HCHG RX REV CODE 250 W/ 637 OVERRIDE(OP): Performed by: EMERGENCY MEDICINE

## 2023-10-07 PROCEDURE — A9270 NON-COVERED ITEM OR SERVICE: HCPCS | Performed by: EMERGENCY MEDICINE

## 2023-10-07 PROCEDURE — 87340 HEPATITIS B SURFACE AG IA: CPT

## 2023-10-07 PROCEDURE — 85027 COMPLETE CBC AUTOMATED: CPT

## 2023-10-07 PROCEDURE — 86704 HEP B CORE ANTIBODY TOTAL: CPT

## 2023-10-07 PROCEDURE — 86803 HEPATITIS C AB TEST: CPT

## 2023-10-07 PROCEDURE — 87389 HIV-1 AG W/HIV-1&-2 AB AG IA: CPT

## 2023-10-07 PROCEDURE — 86706 HEP B SURFACE ANTIBODY: CPT

## 2023-10-07 PROCEDURE — 84703 CHORIONIC GONADOTROPIN ASSAY: CPT

## 2023-10-07 RX ORDER — ONDANSETRON 4 MG/1
4 TABLET, ORALLY DISINTEGRATING ORAL EVERY 6 HOURS PRN
Qty: 16 TABLET | Refills: 0 | Status: SHIPPED | OUTPATIENT
Start: 2023-10-07 | End: 2023-10-11

## 2023-10-07 RX ORDER — EMTRICITABINE AND TENOFOVIR DISOPROXIL FUMARATE 200; 300 MG/1; MG/1
1 TABLET, FILM COATED ORAL ONCE
Status: COMPLETED | OUTPATIENT
Start: 2023-10-07 | End: 2023-10-07

## 2023-10-07 RX ORDER — EMTRICITABINE AND TENOFOVIR DISOPROXIL FUMARATE 200; 300 MG/1; MG/1
1 TABLET, FILM COATED ORAL DAILY
Qty: 28 TABLET | Refills: 0 | Status: ACTIVE | OUTPATIENT
Start: 2023-10-07 | End: 2023-11-04

## 2023-10-07 RX ORDER — AMOXICILLIN AND CLAVULANATE POTASSIUM 875; 125 MG/1; MG/1
1 TABLET, FILM COATED ORAL ONCE
Status: COMPLETED | OUTPATIENT
Start: 2023-10-07 | End: 2023-10-07

## 2023-10-07 RX ORDER — RALTEGRAVIR 400 MG/1
400 TABLET, FILM COATED ORAL 2 TIMES DAILY
Qty: 56 TABLET | Refills: 0 | Status: ACTIVE | OUTPATIENT
Start: 2023-10-07 | End: 2023-11-04

## 2023-10-07 RX ORDER — AMOXICILLIN AND CLAVULANATE POTASSIUM 875; 125 MG/1; MG/1
1 TABLET, FILM COATED ORAL 2 TIMES DAILY
Qty: 14 TABLET | Refills: 0 | Status: ACTIVE | OUTPATIENT
Start: 2023-10-07 | End: 2023-10-14

## 2023-10-07 RX ADMIN — AMOXICILLIN AND CLAVULANATE POTASSIUM 1 TABLET: 875; 125 TABLET, FILM COATED ORAL at 18:21

## 2023-10-07 RX ADMIN — RALTEGRAVIR 400 MG: 400 TABLET, FILM COATED ORAL at 18:19

## 2023-10-07 RX ADMIN — EMTRICITABINE AND TENOFOVIR DISOPROXIL FUMARATE 1 TABLET: 200; 300 TABLET, FILM COATED ORAL at 18:19

## 2023-10-07 ASSESSMENT — FIBROSIS 4 INDEX: FIB4 SCORE: 0.68
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
## 2023-10-08 LAB
ALBUMIN SERPL BCP-MCNC: 4.4 G/DL (ref 3.2–4.9)
ALBUMIN/GLOB SERPL: 1.3 G/DL
ALP SERPL-CCNC: 82 U/L (ref 30–99)
ALT SERPL-CCNC: 16 U/L (ref 2–50)
ANION GAP SERPL CALC-SCNC: 12 MMOL/L (ref 7–16)
AST SERPL-CCNC: 28 U/L (ref 12–45)
BILIRUB SERPL-MCNC: 2.6 MG/DL (ref 0.1–1.5)
BUN SERPL-MCNC: 6 MG/DL (ref 8–22)
CALCIUM ALBUM COR SERPL-MCNC: 8.6 MG/DL (ref 8.5–10.5)
CALCIUM SERPL-MCNC: 8.9 MG/DL (ref 8.4–10.2)
CHLORIDE SERPL-SCNC: 104 MMOL/L (ref 96–112)
CO2 SERPL-SCNC: 21 MMOL/L (ref 20–33)
CREAT SERPL-MCNC: 0.49 MG/DL (ref 0.5–1.4)
ERYTHROCYTE [DISTWIDTH] IN BLOOD BY AUTOMATED COUNT: 67.7 FL (ref 35.9–50)
GLOBULIN SER CALC-MCNC: 3.3 G/DL (ref 1.9–3.5)
GLUCOSE SERPL-MCNC: 77 MG/DL (ref 65–99)
HBV CORE AB SERPL QL IA: NONREACTIVE
HBV SURFACE AB SERPL IA-ACNC: <3.5 MIU/ML (ref 0–10)
HBV SURFACE AG SER QL: ABNORMAL
HCT VFR BLD AUTO: 24.3 % (ref 37–47)
HCV AB SER QL: ABNORMAL
HGB BLD-MCNC: 8.7 G/DL (ref 12–16)
HIV 1+2 AB+HIV1 P24 AG SERPL QL IA: ABNORMAL
MCH RBC QN AUTO: 33.2 PG (ref 27–33)
MCHC RBC AUTO-ENTMCNC: 35.8 G/DL (ref 32.2–35.5)
MCV RBC AUTO: 92.7 FL (ref 81.4–97.8)
PLATELET # BLD AUTO: 264 K/UL (ref 164–446)
PMV BLD AUTO: 9.8 FL (ref 9–12.9)
POTASSIUM SERPL-SCNC: 4.5 MMOL/L (ref 3.6–5.5)
PROT SERPL-MCNC: 7.7 G/DL (ref 6–8.2)
RBC # BLD AUTO: 2.62 M/UL (ref 4.2–5.4)
SODIUM SERPL-SCNC: 137 MMOL/L (ref 135–145)
WBC # BLD AUTO: 16.7 K/UL (ref 4.8–10.8)

## 2023-11-30 ENCOUNTER — APPOINTMENT (OUTPATIENT)
Dept: RADIOLOGY | Facility: MEDICAL CENTER | Age: 23
DRG: 812 | End: 2023-11-30
Attending: EMERGENCY MEDICINE
Payer: MEDICAID

## 2023-11-30 ENCOUNTER — HOSPITAL ENCOUNTER (INPATIENT)
Facility: MEDICAL CENTER | Age: 23
LOS: 4 days | DRG: 812 | End: 2023-12-04
Attending: EMERGENCY MEDICINE | Admitting: HOSPITALIST
Payer: MEDICAID

## 2023-11-30 DIAGNOSIS — D57.00 SICKLE CELL CRISIS (HCC): ICD-10-CM

## 2023-11-30 DIAGNOSIS — M54.50 LUMBAR BACK PAIN: ICD-10-CM

## 2023-11-30 DIAGNOSIS — E87.70 HYPERVOLEMIA, UNSPECIFIED HYPERVOLEMIA TYPE: ICD-10-CM

## 2023-11-30 DIAGNOSIS — J81.0 ACUTE PULMONARY EDEMA (HCC): ICD-10-CM

## 2023-11-30 PROBLEM — Z78.9 FULL CODE STATUS: Status: ACTIVE | Noted: 2023-11-30

## 2023-11-30 LAB
ALBUMIN SERPL BCP-MCNC: 4.3 G/DL (ref 3.2–4.9)
ALBUMIN/GLOB SERPL: 1.3 G/DL
ALP SERPL-CCNC: 82 U/L (ref 30–99)
ALT SERPL-CCNC: 20 U/L (ref 2–50)
ANION GAP SERPL CALC-SCNC: 10 MMOL/L (ref 7–16)
ANISOCYTOSIS BLD QL SMEAR: ABNORMAL
AST SERPL-CCNC: 28 U/L (ref 12–45)
BASOPHILS # BLD AUTO: 0 % (ref 0–1.8)
BASOPHILS # BLD: 0 K/UL (ref 0–0.12)
BILIRUB SERPL-MCNC: 1.5 MG/DL (ref 0.1–1.5)
BUN SERPL-MCNC: 9 MG/DL (ref 8–22)
CALCIUM ALBUM COR SERPL-MCNC: 8.4 MG/DL (ref 8.5–10.5)
CALCIUM SERPL-MCNC: 8.6 MG/DL (ref 8.4–10.2)
CHLORIDE SERPL-SCNC: 105 MMOL/L (ref 96–112)
CO2 SERPL-SCNC: 23 MMOL/L (ref 20–33)
CREAT SERPL-MCNC: 0.51 MG/DL (ref 0.5–1.4)
EOSINOPHIL # BLD AUTO: 0.46 K/UL (ref 0–0.51)
EOSINOPHIL NFR BLD: 3 % (ref 0–6.9)
ERYTHROCYTE [DISTWIDTH] IN BLOOD BY AUTOMATED COUNT: 73.6 FL (ref 35.9–50)
GFR SERPLBLD CREATININE-BSD FMLA CKD-EPI: 135 ML/MIN/1.73 M 2
GLOBULIN SER CALC-MCNC: 3.4 G/DL (ref 1.9–3.5)
GLUCOSE SERPL-MCNC: 84 MG/DL (ref 65–99)
HCG SERPL QL: NEGATIVE
HCT VFR BLD AUTO: 23.7 % (ref 37–47)
HGB BLD-MCNC: 8.1 G/DL (ref 12–16)
HGB RETIC QN AUTO: 32.5 PG/CELL (ref 29–35)
IMM RETICS NFR: 35.5 % (ref 2.6–16.1)
LG PLATELETS BLD QL SMEAR: NORMAL
LYMPHOCYTES # BLD AUTO: 3.7 K/UL (ref 1–4.8)
LYMPHOCYTES NFR BLD: 24 % (ref 22–41)
MACROCYTES BLD QL SMEAR: ABNORMAL
MANUAL DIFF BLD: NORMAL
MCH RBC QN AUTO: 32.3 PG (ref 27–33)
MCHC RBC AUTO-ENTMCNC: 34.2 G/DL (ref 32.2–35.5)
MCV RBC AUTO: 94.4 FL (ref 81.4–97.8)
METAMYELOCYTES NFR BLD MANUAL: 2 %
MONOCYTES # BLD AUTO: 1.23 K/UL (ref 0–0.85)
MONOCYTES NFR BLD AUTO: 8 % (ref 0–13.4)
MYELOCYTES NFR BLD MANUAL: 3 %
NEUTROPHILS # BLD AUTO: 9.24 K/UL (ref 1.82–7.42)
NEUTROPHILS NFR BLD: 58 % (ref 44–72)
NEUTS BAND NFR BLD MANUAL: 2 % (ref 0–10)
NRBC # BLD AUTO: 0.12 K/UL
NRBC BLD-RTO: 0.8 /100 WBC (ref 0–0.2)
PLATELET # BLD AUTO: 298 K/UL (ref 164–446)
PLATELET BLD QL SMEAR: NORMAL
PMV BLD AUTO: 9.5 FL (ref 9–12.9)
POIKILOCYTOSIS BLD QL SMEAR: NORMAL
POLYCHROMASIA BLD QL SMEAR: NORMAL
POTASSIUM SERPL-SCNC: 4.6 MMOL/L (ref 3.6–5.5)
PROT SERPL-MCNC: 7.7 G/DL (ref 6–8.2)
RBC # BLD AUTO: 2.51 M/UL (ref 4.2–5.4)
RBC BLD AUTO: PRESENT
RETICS # AUTO: 0.31 M/UL (ref 0.04–0.12)
RETICS/RBC NFR: 18.1 % (ref 0.8–2.6)
SCHISTOCYTES BLD QL SMEAR: NORMAL
SICKLE CELLS BLD QL SMEAR: NORMAL
SODIUM SERPL-SCNC: 138 MMOL/L (ref 135–145)
TARGETS BLD QL SMEAR: NORMAL
WBC # BLD AUTO: 15.4 K/UL (ref 4.8–10.8)

## 2023-11-30 PROCEDURE — 85007 BL SMEAR W/DIFF WBC COUNT: CPT

## 2023-11-30 PROCEDURE — 85027 COMPLETE CBC AUTOMATED: CPT

## 2023-11-30 PROCEDURE — 84703 CHORIONIC GONADOTROPIN ASSAY: CPT

## 2023-11-30 PROCEDURE — 85046 RETICYTE/HGB CONCENTRATE: CPT

## 2023-11-30 PROCEDURE — 700111 HCHG RX REV CODE 636 W/ 250 OVERRIDE (IP): Performed by: HOSPITALIST

## 2023-11-30 PROCEDURE — 36415 COLL VENOUS BLD VENIPUNCTURE: CPT

## 2023-11-30 PROCEDURE — 700102 HCHG RX REV CODE 250 W/ 637 OVERRIDE(OP): Performed by: EMERGENCY MEDICINE

## 2023-11-30 PROCEDURE — 96376 TX/PRO/DX INJ SAME DRUG ADON: CPT

## 2023-11-30 PROCEDURE — 99285 EMERGENCY DEPT VISIT HI MDM: CPT

## 2023-11-30 PROCEDURE — A9270 NON-COVERED ITEM OR SERVICE: HCPCS | Performed by: HOSPITALIST

## 2023-11-30 PROCEDURE — 96365 THER/PROPH/DIAG IV INF INIT: CPT

## 2023-11-30 PROCEDURE — 80053 COMPREHEN METABOLIC PANEL: CPT

## 2023-11-30 PROCEDURE — 700105 HCHG RX REV CODE 258: Performed by: HOSPITALIST

## 2023-11-30 PROCEDURE — 700105 HCHG RX REV CODE 258: Performed by: EMERGENCY MEDICINE

## 2023-11-30 PROCEDURE — 770020 HCHG ROOM/CARE - TELE (206)

## 2023-11-30 PROCEDURE — A9270 NON-COVERED ITEM OR SERVICE: HCPCS | Performed by: EMERGENCY MEDICINE

## 2023-11-30 PROCEDURE — 700111 HCHG RX REV CODE 636 W/ 250 OVERRIDE (IP): Mod: JZ | Performed by: EMERGENCY MEDICINE

## 2023-11-30 PROCEDURE — 96375 TX/PRO/DX INJ NEW DRUG ADDON: CPT

## 2023-11-30 PROCEDURE — 700102 HCHG RX REV CODE 250 W/ 637 OVERRIDE(OP): Performed by: HOSPITALIST

## 2023-11-30 PROCEDURE — 700101 HCHG RX REV CODE 250: Performed by: EMERGENCY MEDICINE

## 2023-11-30 PROCEDURE — 72100 X-RAY EXAM L-S SPINE 2/3 VWS: CPT

## 2023-11-30 PROCEDURE — 94760 N-INVAS EAR/PLS OXIMETRY 1: CPT

## 2023-11-30 PROCEDURE — 99223 1ST HOSP IP/OBS HIGH 75: CPT | Performed by: HOSPITALIST

## 2023-11-30 RX ORDER — ALBUTEROL SULFATE 90 UG/1
2 AEROSOL, METERED RESPIRATORY (INHALATION) EVERY 6 HOURS PRN
Status: DISCONTINUED | OUTPATIENT
Start: 2023-11-30 | End: 2023-12-04 | Stop reason: HOSPADM

## 2023-11-30 RX ORDER — HYDROMORPHONE HYDROCHLORIDE 1 MG/ML
1 INJECTION, SOLUTION INTRAMUSCULAR; INTRAVENOUS; SUBCUTANEOUS ONCE
Status: COMPLETED | OUTPATIENT
Start: 2023-11-30 | End: 2023-11-30

## 2023-11-30 RX ORDER — DIAZEPAM 5 MG/1
5 TABLET ORAL EVERY 6 HOURS PRN
Status: DISCONTINUED | OUTPATIENT
Start: 2023-11-30 | End: 2023-12-01

## 2023-11-30 RX ORDER — POLYETHYLENE GLYCOL 3350 17 G/17G
1 POWDER, FOR SOLUTION ORAL
Status: DISCONTINUED | OUTPATIENT
Start: 2023-11-30 | End: 2023-12-04 | Stop reason: HOSPADM

## 2023-11-30 RX ORDER — HYDROXYUREA 500 MG/1
1500 CAPSULE ORAL DAILY
Status: DISCONTINUED | OUTPATIENT
Start: 2023-12-01 | End: 2023-12-02

## 2023-11-30 RX ORDER — VITAMIN B COMPLEX
5000 TABLET ORAL DAILY
Status: DISCONTINUED | OUTPATIENT
Start: 2023-11-30 | End: 2023-12-04 | Stop reason: HOSPADM

## 2023-11-30 RX ORDER — DIAZEPAM 5 MG/ML
5 INJECTION, SOLUTION INTRAMUSCULAR; INTRAVENOUS ONCE
Status: DISCONTINUED | OUTPATIENT
Start: 2023-11-30 | End: 2023-11-30

## 2023-11-30 RX ORDER — PROMETHAZINE HYDROCHLORIDE 25 MG/1
12.5-25 TABLET ORAL EVERY 4 HOURS PRN
Status: DISCONTINUED | OUTPATIENT
Start: 2023-11-30 | End: 2023-12-04 | Stop reason: HOSPADM

## 2023-11-30 RX ORDER — FOLIC ACID 1 MG/1
1 TABLET ORAL DAILY
Status: DISCONTINUED | OUTPATIENT
Start: 2023-12-01 | End: 2023-12-04 | Stop reason: HOSPADM

## 2023-11-30 RX ORDER — PROCHLORPERAZINE EDISYLATE 5 MG/ML
5-10 INJECTION INTRAMUSCULAR; INTRAVENOUS EVERY 4 HOURS PRN
Status: DISCONTINUED | OUTPATIENT
Start: 2023-11-30 | End: 2023-12-04 | Stop reason: HOSPADM

## 2023-11-30 RX ORDER — IBUPROFEN 600 MG/1
600 TABLET ORAL EVERY 6 HOURS PRN
Status: DISCONTINUED | OUTPATIENT
Start: 2023-11-30 | End: 2023-12-01

## 2023-11-30 RX ORDER — HYDRALAZINE HYDROCHLORIDE 20 MG/ML
10 INJECTION INTRAMUSCULAR; INTRAVENOUS EVERY 4 HOURS PRN
Status: DISCONTINUED | OUTPATIENT
Start: 2023-11-30 | End: 2023-12-04 | Stop reason: HOSPADM

## 2023-11-30 RX ORDER — HEPARIN SODIUM 5000 [USP'U]/ML
5000 INJECTION, SOLUTION INTRAVENOUS; SUBCUTANEOUS EVERY 8 HOURS
Status: DISCONTINUED | OUTPATIENT
Start: 2023-11-30 | End: 2023-12-01

## 2023-11-30 RX ORDER — SODIUM CHLORIDE 9 MG/ML
1000 INJECTION, SOLUTION INTRAVENOUS ONCE
Status: COMPLETED | OUTPATIENT
Start: 2023-11-30 | End: 2023-11-30

## 2023-11-30 RX ORDER — ONDANSETRON 2 MG/ML
4 INJECTION INTRAMUSCULAR; INTRAVENOUS EVERY 4 HOURS PRN
Status: DISCONTINUED | OUTPATIENT
Start: 2023-11-30 | End: 2023-12-04 | Stop reason: HOSPADM

## 2023-11-30 RX ORDER — ACETAMINOPHEN 325 MG/1
650 TABLET ORAL EVERY 6 HOURS PRN
Status: DISCONTINUED | OUTPATIENT
Start: 2023-11-30 | End: 2023-12-01

## 2023-11-30 RX ORDER — ONDANSETRON 4 MG/1
4 TABLET, ORALLY DISINTEGRATING ORAL EVERY 4 HOURS PRN
Status: DISCONTINUED | OUTPATIENT
Start: 2023-11-30 | End: 2023-12-04 | Stop reason: HOSPADM

## 2023-11-30 RX ORDER — CYCLOBENZAPRINE HCL 10 MG
10 TABLET ORAL ONCE
Status: COMPLETED | OUTPATIENT
Start: 2023-11-30 | End: 2023-11-30

## 2023-11-30 RX ORDER — BISACODYL 10 MG
10 SUPPOSITORY, RECTAL RECTAL
Status: DISCONTINUED | OUTPATIENT
Start: 2023-11-30 | End: 2023-12-04 | Stop reason: HOSPADM

## 2023-11-30 RX ORDER — PROMETHAZINE HYDROCHLORIDE 25 MG/1
12.5-25 SUPPOSITORY RECTAL EVERY 4 HOURS PRN
Status: DISCONTINUED | OUTPATIENT
Start: 2023-11-30 | End: 2023-12-04 | Stop reason: HOSPADM

## 2023-11-30 RX ORDER — ONDANSETRON 2 MG/ML
4 INJECTION INTRAMUSCULAR; INTRAVENOUS ONCE
Status: COMPLETED | OUTPATIENT
Start: 2023-11-30 | End: 2023-11-30

## 2023-11-30 RX ORDER — AMOXICILLIN 250 MG
2 CAPSULE ORAL 2 TIMES DAILY
Status: DISCONTINUED | OUTPATIENT
Start: 2023-11-30 | End: 2023-12-04 | Stop reason: HOSPADM

## 2023-11-30 RX ORDER — SODIUM CHLORIDE 9 MG/ML
INJECTION, SOLUTION INTRAVENOUS CONTINUOUS
Status: DISCONTINUED | OUTPATIENT
Start: 2023-11-30 | End: 2023-12-03

## 2023-11-30 RX ORDER — PENICILLIN V POTASSIUM 500 MG/1
250 TABLET ORAL 2 TIMES DAILY
Status: DISCONTINUED | OUTPATIENT
Start: 2023-11-30 | End: 2023-12-04 | Stop reason: HOSPADM

## 2023-11-30 RX ADMIN — HYDROMORPHONE HYDROCHLORIDE 1 MG: 1 INJECTION, SOLUTION INTRAMUSCULAR; INTRAVENOUS; SUBCUTANEOUS at 14:05

## 2023-11-30 RX ADMIN — Medication: at 21:19

## 2023-11-30 RX ADMIN — HYDROMORPHONE HYDROCHLORIDE 1 MG: 1 INJECTION, SOLUTION INTRAMUSCULAR; INTRAVENOUS; SUBCUTANEOUS at 16:46

## 2023-11-30 RX ADMIN — Medication: at 20:27

## 2023-11-30 RX ADMIN — DIAZEPAM 5 MG: 5 TABLET ORAL at 20:24

## 2023-11-30 RX ADMIN — KETAMINE HYDROCHLORIDE 25 MG: 10 INJECTION INTRAMUSCULAR; INTRAVENOUS at 18:18

## 2023-11-30 RX ADMIN — Medication 5000 UNITS: at 19:50

## 2023-11-30 RX ADMIN — Medication: at 20:59

## 2023-11-30 RX ADMIN — HYDROMORPHONE HYDROCHLORIDE 1 MG: 1 INJECTION, SOLUTION INTRAMUSCULAR; INTRAVENOUS; SUBCUTANEOUS at 15:54

## 2023-11-30 RX ADMIN — ONDANSETRON 4 MG: 2 INJECTION INTRAMUSCULAR; INTRAVENOUS at 14:05

## 2023-11-30 RX ADMIN — SODIUM CHLORIDE: 9 INJECTION, SOLUTION INTRAVENOUS at 19:30

## 2023-11-30 RX ADMIN — CYCLOBENZAPRINE 10 MG: 10 TABLET, FILM COATED ORAL at 16:46

## 2023-11-30 RX ADMIN — Medication: at 19:30

## 2023-11-30 RX ADMIN — SODIUM CHLORIDE 1000 ML: 9 INJECTION, SOLUTION INTRAVENOUS at 14:05

## 2023-11-30 RX ADMIN — PENICILLIN V POTASSIUM 250 MG: 500 TABLET, FILM COATED ORAL at 21:23

## 2023-11-30 RX ADMIN — SODIUM CHLORIDE 1000 ML: 9 INJECTION, SOLUTION INTRAVENOUS at 16:46

## 2023-11-30 RX ADMIN — HEPARIN SODIUM 5000 UNITS: 5000 INJECTION, SOLUTION INTRAVENOUS; SUBCUTANEOUS at 21:23

## 2023-11-30 ASSESSMENT — ENCOUNTER SYMPTOMS
DOUBLE VISION: 0
CARDIOVASCULAR NEGATIVE: 1
FOCAL WEAKNESS: 0
HEADACHES: 1
DEPRESSION: 1
CONSTITUTIONAL NEGATIVE: 1
GASTROINTESTINAL NEGATIVE: 1
NAUSEA: 0
MYALGIAS: 1
DIAPHORESIS: 0
HEMOPTYSIS: 0
DIZZINESS: 0
HEARTBURN: 0
SEIZURES: 0
CONSTIPATION: 0
BLOOD IN STOOL: 0
RESPIRATORY NEGATIVE: 1
COUGH: 0
INSOMNIA: 1
NERVOUS/ANXIOUS: 1
PALPITATIONS: 0
DIARRHEA: 0
SORE THROAT: 0
WHEEZING: 0
LOSS OF CONSCIOUSNESS: 0
BLURRED VISION: 0
ABDOMINAL PAIN: 0
VOMITING: 0
BRUISES/BLEEDS EASILY: 0
FEVER: 0
EYES NEGATIVE: 1
CHILLS: 0

## 2023-11-30 ASSESSMENT — COGNITIVE AND FUNCTIONAL STATUS - GENERAL
MOBILITY SCORE: 24
DAILY ACTIVITIY SCORE: 24
SUGGESTED CMS G CODE MODIFIER DAILY ACTIVITY: CH
SUGGESTED CMS G CODE MODIFIER MOBILITY: CH

## 2023-11-30 ASSESSMENT — FIBROSIS 4 INDEX
FIB4 SCORE: .5833333333333333333
FIB4 SCORE: 0.46

## 2023-11-30 ASSESSMENT — COPD QUESTIONNAIRES
DURING THE PAST 4 WEEKS HOW MUCH DID YOU FEEL SHORT OF BREATH: NONE/LITTLE OF THE TIME
HAVE YOU SMOKED AT LEAST 100 CIGARETTES IN YOUR ENTIRE LIFE: NO/DON'T KNOW
DO YOU EVER COUGH UP ANY MUCUS OR PHLEGM?: NO/ONLY WITH OCCASIONAL COLDS OR INFECTIONS
COPD SCREENING SCORE: 0

## 2023-11-30 ASSESSMENT — PATIENT HEALTH QUESTIONNAIRE - PHQ9
1. LITTLE INTEREST OR PLEASURE IN DOING THINGS: NOT AT ALL
SUM OF ALL RESPONSES TO PHQ9 QUESTIONS 1 AND 2: 0
2. FEELING DOWN, DEPRESSED, IRRITABLE, OR HOPELESS: NOT AT ALL

## 2023-11-30 ASSESSMENT — PAIN DESCRIPTION - PAIN TYPE
TYPE: CHRONIC PAIN;ACUTE PAIN
TYPE: ACUTE PAIN

## 2023-11-30 ASSESSMENT — LIFESTYLE VARIABLES: SUBSTANCE_ABUSE: 1
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
## 2023-11-30 NOTE — ED TRIAGE NOTES
[facility] ems for above complaints.     Chief Complaint   Patient presents with    Low Back Pain     Pt has hx of sickle cell. Pt states she did have a fall about a month ago and fell onto her buttock. Pt also states she fell approx 2 days ago again onto her buttocks. Pt does endorses some pain down both legs and some tingling in her feet. No neuro deficits noted. Pt recd 2.5mg morphine and 250 ml IVF PTA. Pt states pain still 9/10 lower back and in joints. Pt placed on 2L NC for comfort.      /60   Pulse 84   Temp 36.5 °C (97.7 °F) (Temporal)   Resp 18   LMP 11/23/2023 (Approximate)   SpO2 98%   Breastfeeding No

## 2023-12-01 ENCOUNTER — APPOINTMENT (OUTPATIENT)
Dept: RADIOLOGY | Facility: MEDICAL CENTER | Age: 23
DRG: 812 | End: 2023-12-01
Attending: INTERNAL MEDICINE
Payer: MEDICAID

## 2023-12-01 LAB
ABO GROUP BLD: NORMAL
ANION GAP SERPL CALC-SCNC: 8 MMOL/L (ref 7–16)
BARCODED ABORH UBTYP: 5100
BARCODED ABORH UBTYP: 9500
BARCODED ABORH UBTYP: 9500
BARCODED PRD CODE UBPRD: NORMAL
BARCODED UNIT NUM UBUNT: NORMAL
BLD GP AB SCN SERPL QL: NORMAL
BUN SERPL-MCNC: 5 MG/DL (ref 8–22)
CALCIUM SERPL-MCNC: 8.2 MG/DL (ref 8.4–10.2)
CHLORIDE SERPL-SCNC: 106 MMOL/L (ref 96–112)
CO2 SERPL-SCNC: 22 MMOL/L (ref 20–33)
COMPONENT R 8504R: NORMAL
CREAT SERPL-MCNC: 0.35 MG/DL (ref 0.5–1.4)
ERYTHROCYTE [DISTWIDTH] IN BLOOD BY AUTOMATED COUNT: 76.8 FL (ref 35.9–50)
GFR SERPLBLD CREATININE-BSD FMLA CKD-EPI: 148 ML/MIN/1.73 M 2
GLUCOSE SERPL-MCNC: 117 MG/DL (ref 65–99)
HCT VFR BLD AUTO: 17.8 % (ref 37–47)
HCT VFR BLD AUTO: 19.9 % (ref 37–47)
HCT VFR BLD AUTO: 23 % (ref 37–47)
HGB BLD-MCNC: 6.4 G/DL (ref 12–16)
HGB BLD-MCNC: 7.1 G/DL (ref 12–16)
HGB BLD-MCNC: 8.2 G/DL (ref 12–16)
MCH RBC QN AUTO: 33 PG (ref 27–33)
MCHC RBC AUTO-ENTMCNC: 35.7 G/DL (ref 32.2–35.5)
MCV RBC AUTO: 92.6 FL (ref 81.4–97.8)
PLATELET # BLD AUTO: 201 K/UL (ref 164–446)
PMV BLD AUTO: 9.6 FL (ref 9–12.9)
POTASSIUM SERPL-SCNC: 4.5 MMOL/L (ref 3.6–5.5)
PROCALCITONIN SERPL-MCNC: 0.6 NG/ML
PRODUCT TYPE UPROD: NORMAL
RBC # BLD AUTO: 2.15 M/UL (ref 4.2–5.4)
RH BLD: NORMAL
SODIUM SERPL-SCNC: 136 MMOL/L (ref 135–145)
UNIT STATUS USTAT: NORMAL
WBC # BLD AUTO: 22.9 K/UL (ref 4.8–10.8)

## 2023-12-01 PROCEDURE — 85018 HEMOGLOBIN: CPT

## 2023-12-01 PROCEDURE — 700111 HCHG RX REV CODE 636 W/ 250 OVERRIDE (IP): Performed by: HOSPITALIST

## 2023-12-01 PROCEDURE — 770020 HCHG ROOM/CARE - TELE (206)

## 2023-12-01 PROCEDURE — 700105 HCHG RX REV CODE 258: Performed by: INTERNAL MEDICINE

## 2023-12-01 PROCEDURE — 87040 BLOOD CULTURE FOR BACTERIA: CPT

## 2023-12-01 PROCEDURE — 94760 N-INVAS EAR/PLS OXIMETRY 1: CPT

## 2023-12-01 PROCEDURE — 80048 BASIC METABOLIC PNL TOTAL CA: CPT

## 2023-12-01 PROCEDURE — A9270 NON-COVERED ITEM OR SERVICE: HCPCS | Performed by: HOSPITALIST

## 2023-12-01 PROCEDURE — 86900 BLOOD TYPING SEROLOGIC ABO: CPT

## 2023-12-01 PROCEDURE — 700111 HCHG RX REV CODE 636 W/ 250 OVERRIDE (IP): Mod: JZ | Performed by: INTERNAL MEDICINE

## 2023-12-01 PROCEDURE — 84145 PROCALCITONIN (PCT): CPT

## 2023-12-01 PROCEDURE — P9016 RBC LEUKOCYTES REDUCED: HCPCS

## 2023-12-01 PROCEDURE — 36430 TRANSFUSION BLD/BLD COMPNT: CPT

## 2023-12-01 PROCEDURE — 86923 COMPATIBILITY TEST ELECTRIC: CPT | Mod: 91

## 2023-12-01 PROCEDURE — 71045 X-RAY EXAM CHEST 1 VIEW: CPT

## 2023-12-01 PROCEDURE — 36415 COLL VENOUS BLD VENIPUNCTURE: CPT

## 2023-12-01 PROCEDURE — 700105 HCHG RX REV CODE 258: Performed by: HOSPITALIST

## 2023-12-01 PROCEDURE — 99233 SBSQ HOSP IP/OBS HIGH 50: CPT | Performed by: INTERNAL MEDICINE

## 2023-12-01 PROCEDURE — C9803 HOPD COVID-19 SPEC COLLECT: HCPCS | Performed by: INTERNAL MEDICINE

## 2023-12-01 PROCEDURE — 700102 HCHG RX REV CODE 250 W/ 637 OVERRIDE(OP): Performed by: INTERNAL MEDICINE

## 2023-12-01 PROCEDURE — 86902 BLOOD TYPE ANTIGEN DONOR EA: CPT | Mod: 91

## 2023-12-01 PROCEDURE — 85014 HEMATOCRIT: CPT

## 2023-12-01 PROCEDURE — 700102 HCHG RX REV CODE 250 W/ 637 OVERRIDE(OP): Performed by: HOSPITALIST

## 2023-12-01 PROCEDURE — A9270 NON-COVERED ITEM OR SERVICE: HCPCS | Performed by: INTERNAL MEDICINE

## 2023-12-01 PROCEDURE — 86901 BLOOD TYPING SEROLOGIC RH(D): CPT

## 2023-12-01 PROCEDURE — 85027 COMPLETE CBC AUTOMATED: CPT

## 2023-12-01 PROCEDURE — 86850 RBC ANTIBODY SCREEN: CPT

## 2023-12-01 RX ORDER — KETOROLAC TROMETHAMINE 30 MG/ML
15 INJECTION, SOLUTION INTRAMUSCULAR; INTRAVENOUS ONCE
Status: COMPLETED | OUTPATIENT
Start: 2023-12-02 | End: 2023-12-02

## 2023-12-01 RX ORDER — IBUPROFEN 600 MG/1
600 TABLET ORAL EVERY 6 HOURS PRN
Status: DISCONTINUED | OUTPATIENT
Start: 2023-12-02 | End: 2023-12-04 | Stop reason: HOSPADM

## 2023-12-01 RX ORDER — CYCLOBENZAPRINE HCL 10 MG
5 TABLET ORAL 3 TIMES DAILY PRN
Status: DISCONTINUED | OUTPATIENT
Start: 2023-12-01 | End: 2023-12-02

## 2023-12-01 RX ORDER — ACETAMINOPHEN 500 MG
1000 TABLET ORAL EVERY 6 HOURS
Status: DISCONTINUED | OUTPATIENT
Start: 2023-12-02 | End: 2023-12-04 | Stop reason: HOSPADM

## 2023-12-01 RX ORDER — MENTHOL AND METHYL SALICYLATE 7.6; 29 G/100G; G/100G
OINTMENT TOPICAL PRN
Status: DISCONTINUED | OUTPATIENT
Start: 2023-12-01 | End: 2023-12-04 | Stop reason: HOSPADM

## 2023-12-01 RX ORDER — CYCLOBENZAPRINE HCL 10 MG
10 TABLET ORAL ONCE
Status: COMPLETED | OUTPATIENT
Start: 2023-12-01 | End: 2023-12-01

## 2023-12-01 RX ORDER — DIPHENHYDRAMINE HCL 25 MG
25 TABLET ORAL ONCE
Status: COMPLETED | OUTPATIENT
Start: 2023-12-01 | End: 2023-12-01

## 2023-12-01 RX ORDER — LIDOCAINE 50 MG/G
1 PATCH TOPICAL EVERY 24 HOURS
Status: DISCONTINUED | OUTPATIENT
Start: 2023-12-02 | End: 2023-12-04

## 2023-12-01 RX ORDER — DIAZEPAM 2 MG/1
2 TABLET ORAL EVERY 12 HOURS PRN
Status: DISCONTINUED | OUTPATIENT
Start: 2023-12-01 | End: 2023-12-04 | Stop reason: HOSPADM

## 2023-12-01 RX ORDER — AZITHROMYCIN 250 MG/1
500 TABLET, FILM COATED ORAL DAILY
Status: DISPENSED | OUTPATIENT
Start: 2023-12-01 | End: 2023-12-04

## 2023-12-01 RX ADMIN — HYDROXYUREA 1500 MG: 500 CAPSULE ORAL at 04:14

## 2023-12-01 RX ADMIN — DIPHENHYDRAMINE HYDROCHLORIDE 25 MG: 25 TABLET ORAL at 00:13

## 2023-12-01 RX ADMIN — Medication: at 15:02

## 2023-12-01 RX ADMIN — DOCUSATE SODIUM 50MG AND SENNOSIDES 8.6MG 2 TABLET: 8.6; 5 TABLET, FILM COATED ORAL at 05:10

## 2023-12-01 RX ADMIN — ONDANSETRON 4 MG: 2 INJECTION INTRAMUSCULAR; INTRAVENOUS at 01:39

## 2023-12-01 RX ADMIN — SODIUM CHLORIDE: 9 INJECTION, SOLUTION INTRAVENOUS at 18:40

## 2023-12-01 RX ADMIN — PENICILLIN V POTASSIUM 250 MG: 500 TABLET, FILM COATED ORAL at 09:06

## 2023-12-01 RX ADMIN — CYCLOBENZAPRINE 10 MG: 10 TABLET, FILM COATED ORAL at 04:12

## 2023-12-01 RX ADMIN — Medication: at 09:17

## 2023-12-01 RX ADMIN — CYCLOBENZAPRINE 5 MG: 10 TABLET, FILM COATED ORAL at 10:53

## 2023-12-01 RX ADMIN — Medication 1 LOZENGE: at 14:05

## 2023-12-01 RX ADMIN — CEFTRIAXONE SODIUM 1000 MG: 1 INJECTION, POWDER, FOR SOLUTION INTRAMUSCULAR; INTRAVENOUS at 09:06

## 2023-12-01 RX ADMIN — HEPARIN SODIUM 5000 UNITS: 5000 INJECTION, SOLUTION INTRAVENOUS; SUBCUTANEOUS at 05:10

## 2023-12-01 RX ADMIN — SODIUM CHLORIDE: 9 INJECTION, SOLUTION INTRAVENOUS at 08:34

## 2023-12-01 RX ADMIN — FOLIC ACID 1 MG: 1 TABLET ORAL at 05:10

## 2023-12-01 RX ADMIN — Medication: at 20:47

## 2023-12-01 RX ADMIN — PENICILLIN V POTASSIUM 250 MG: 500 TABLET, FILM COATED ORAL at 18:37

## 2023-12-01 RX ADMIN — Medication: at 03:38

## 2023-12-01 ASSESSMENT — ENCOUNTER SYMPTOMS
SHORTNESS OF BREATH: 0
CHILLS: 1
DIZZINESS: 0
DOUBLE VISION: 0
COUGH: 0
MYALGIAS: 1
BLURRED VISION: 0
BACK PAIN: 1
PALPITATIONS: 0
NERVOUS/ANXIOUS: 0
VOMITING: 0
HEARTBURN: 0
HEADACHES: 0
FEVER: 1
ABDOMINAL PAIN: 0

## 2023-12-01 ASSESSMENT — PAIN DESCRIPTION - PAIN TYPE
TYPE: ACUTE PAIN

## 2023-12-01 ASSESSMENT — LIFESTYLE VARIABLES: SUBSTANCE_ABUSE: 0

## 2023-12-01 NOTE — PROGRESS NOTES
"Hospital Medicine Daily Progress Note    Date of Service  12/1/2023    Chief Complaint  Henry Anand is a 23 y.o. female admitted 11/30/2023 with low back pain.    Hospital Course  As per chart review:  \"23 y.o. female who presented 11/30/2023 with severe pain in her lower extremity as severe anxiety.  Patient has sickle cell disease and at this point feels like she is going into sickle cell crisis.  Hemoglobin stable at 8.1.  White blood cell count is elevated.  Patient's pain is out of proportion to the acute findings.  Patient's pain needs to be managed with a PCA pump.  Patient will be given fluid resuscitation.  Will monitor hemoglobin to ensure that the patient does not drop her counts and if she does drop we will need to transfuse her.\"    Interval Problem Update  12/1: Patient seen at bedside this morning.  Patient is very visibly anxious.  On examination the patient is desaturating, however she is refusing to place oxygen even after multiple attempts at explaining the importance of oxygen.  She just kept on refusing using oxygen.  Mother and charge nurse as well as bedside nurse present at the time of my interaction with the patient.  Hemoglobin this morning dropped to 6.4.  We have ordered blood transfusion, we are pending the patient to get the blood transfusion.  Will repeat hemoglobin after the blood transfusion.  For now we will continue to monitor vital signs closely.  I did discuss with hematology as a curbside further treatment options.  For now we will continue with hydroxyurea, IV fluids, pain management and started on antibiotics empirically.  Continue to monitor closely.    I have discussed this patient's plan of care and discharge plan at IDT rounds today with Case Management, Nursing, Nursing leadership, and other members of the IDT team.    Consultants/Specialty      Code Status  Full Code    Disposition  The patient is not medically cleared for discharge to home or a " post-acute facility.      I have placed the appropriate orders for post-discharge needs.    Review of Systems  Review of Systems   Constitutional:  Positive for chills, fever and malaise/fatigue.   HENT:  Negative for hearing loss and nosebleeds.    Eyes:  Negative for blurred vision and double vision.   Respiratory:  Negative for cough and shortness of breath.    Cardiovascular:  Negative for chest pain and palpitations.   Gastrointestinal:  Negative for abdominal pain, heartburn and vomiting.   Genitourinary:  Negative for dysuria and urgency.   Musculoskeletal:  Positive for back pain and myalgias.   Skin:  Negative for itching and rash.   Neurological:  Negative for dizziness and headaches.   Psychiatric/Behavioral:  Negative for substance abuse. The patient is not nervous/anxious.    All other systems reviewed and are negative.       Physical Exam  Temp:  [36.6 °C (97.9 °F)-38.2 °C (100.7 °F)] 37.7 °C (99.9 °F)  Pulse:  [] 106  Resp:  [16-22] 16  BP: (100-115)/(53-90) 113/68  SpO2:  [86 %-93 %] 89 %    Physical Exam  Vitals and nursing note reviewed.   Constitutional:       General: She is not in acute distress.     Appearance: She is ill-appearing.   HENT:      Head: Normocephalic and atraumatic.      Right Ear: External ear normal.      Left Ear: External ear normal.      Mouth/Throat:      Pharynx: No oropharyngeal exudate or posterior oropharyngeal erythema.   Eyes:      General:         Right eye: No discharge.         Left eye: No discharge.   Cardiovascular:      Rate and Rhythm: Regular rhythm. Tachycardia present.      Pulses: Normal pulses.      Heart sounds: No murmur heard.     No gallop.   Pulmonary:      Effort: Pulmonary effort is normal. No respiratory distress.      Breath sounds: Normal breath sounds. No wheezing or rhonchi.   Abdominal:      General: Bowel sounds are normal. There is no distension.      Palpations: Abdomen is soft.      Tenderness: There is no abdominal tenderness.  There is no guarding.   Musculoskeletal:         General: Tenderness present.      Cervical back: Normal range of motion and neck supple. No tenderness.   Skin:     General: Skin is warm and dry.   Neurological:      General: No focal deficit present.      Mental Status: She is alert and oriented to person, place, and time. Mental status is at baseline.      Motor: No weakness.   Psychiatric:      Comments: Patient seems to be anxious, and has been very rude with staff and using foul language.         Fluids    Intake/Output Summary (Last 24 hours) at 12/1/2023 1330  Last data filed at 12/1/2023 0900  Gross per 24 hour   Intake 176.65 ml   Output --   Net 176.65 ml       Laboratory  Recent Labs     11/30/23  1406 12/01/23  0150 12/01/23  0646   WBC 15.4* 22.9*  --    RBC 2.51* 2.15*  --    HEMOGLOBIN 8.1* 7.1* 6.4*   HEMATOCRIT 23.7* 19.9* 17.8*   MCV 94.4 92.6  --    MCH 32.3 33.0  --    MCHC 34.2 35.7*  --    RDW 73.6* 76.8*  --    PLATELETCT 298 201  --    MPV 9.5 9.6  --      Recent Labs     11/30/23  1406 12/01/23  0150   SODIUM 138 136   POTASSIUM 4.6 4.5   CHLORIDE 105 106   CO2 23 22   GLUCOSE 84 117*   BUN 9 5*   CREATININE 0.51 0.35*   CALCIUM 8.6 8.2*                   Imaging  DX-CHEST-LIMITED (1 VIEW)   Final Result         1.  No acute cardiopulmonary disease.      DX-LUMBAR SPINE-2 OR 3 VIEWS   Final Result      1.  No evidence of lumbar fracture or significant degenerative disk disease.      2.  Convex right scoliotic curvature.           Assessment/Plan  * Sickle cell crisis (HCC)- (present on admission)  Assessment & Plan  Patient with known sickle cell disease has developed sickle cell crisis with severe pain and has come to the emergency room for evaluation.  Hemoglobin at this point stable at 8 currently no transfusion indicated.  Patient will need pain management and thus I am initiating her on a morphine PCA.  Fluid resuscitation  Monitor H&H  Monitor [facility] event  Monitor electrolytes and  "correct as needed    12/1: I discussed case with hematology, for now we will continue with IV fluid resuscitation, blood transfusion as needed, pain management as well as initiating empiric antibiotic treatment with ceftriaxone and azithromycin.    Full code status  Assessment & Plan  As per previous hospitalist:  \"As discussed with patient and mother full CODE STATUS at this point\"    Normocytic anemia- (present on admission)  Assessment & Plan  Currently hemoglobin is 8.1 with hematocrit 23.7  No indication for transfusion.    12/1: Hb is 6.4 this morning. We have ordered transfusion. I have ordered 2 U as it seems it is difficult to obtain patient's blood type. Monitor closely for any signs of decompensation. We will repeat hemoglobin later this afternoon, we would try to get hemoglobin after patient's transfusion.    Leukocytosis- (present on admission)  Assessment & Plan  Leukocytosis is chronic and the patient does not appear to have an acute infection.  Continue to monitor white blood cell count    12/1: Worsening leukocytosis, We have started patient on ceftriaxone and azythromycin.    PTSD (post-traumatic stress disorder)- (present on admission)  Assessment & Plan  History of PTSD currently not on any medications for it and the patient does not seem to have an acute exacerbation noted.    Mild persistent asthma without complication- (present on admission)  Assessment & Plan  RT protocol  Nebulizer treatments  Oxygen as needed    Cannabis abuse- (present on admission)  Assessment & Plan  Cannabis use as an outpatient she will not be allowed to use cannabis here.    Anxiety associated with depression- (present on admission)  Assessment & Plan  Chronic anxiety and without any anxiolytic use at home.  Utilize Valium for anxiolytic management         VTE prophylaxis: SCDs    I have performed a physical exam and reviewed and updated ROS and Plan today (12/1/2023). In review of yesterday's note (11/30/2023), there " are no changes except as documented above.      I spend at least 51 minutes providing care for this patient.  This included face-to-face interview, physical examination.  Review of lab work including CBC, hemoglobin, BMP.  Discussion with multidisciplinary team including case management, nursing staff and pharmacy.  Creating plan of care, reviewing orders.

## 2023-12-01 NOTE — ASSESSMENT & PLAN NOTE
Patient with known sickle cell disease has developed sickle cell crisis with severe pain and has come to the emergency room for evaluation.  Hemoglobin at this point stable at 8 currently no transfusion indicated.  Patient will need pain management and thus I am initiating her on a morphine PCA.  Fluid resuscitation  Monitor H&H  Monitor [facility] event  Monitor electrolytes and correct as needed    12/2: I discussed case with hematology, for now we will continue with IV fluid resuscitation, blood transfusion as needed, pain management as well as initiating empiric antibiotic treatment with ceftriaxone and azithromycin.  Patient has had 2 episodes of fever, we are following cultures.  --Yesterday patient was complaining of worsening pain so we added Flexeril as well as increase the dose of her PCA pump of Dilaudid.  Today the patient was not really wanting to engage in a conversation with me.  She did mention she was having pain.  12/3 PCA pump decreased to 1.0 basal and bolus kept same, patient having increased confusion and repeatedly refusing to keep oxygen in place, was receiving too much pain medication.

## 2023-12-01 NOTE — ASSESSMENT & PLAN NOTE
Chronic anxiety and without any anxiolytic use at home.      12/2: Currently on valium and atarax PRN.

## 2023-12-01 NOTE — PROGRESS NOTES
"Primary RN received call from lab regarding critical result (see previous note), Dr. Vargas present on the floor and notified in person. Primary RN and Dr. Vargas entered pt room to discuss the need to transfuse PRBC. Patient quickly became agitated by raising her voice and using profanity. Dr. Vargas asked patient to place oxygen on due to her oxygen saturation in being in the mid 80s. Patient [place] her voice again stating that \"I am the one in the bed, if you would just give me time.\" Dr. Vargas reiterated the severity of the situation and once again asked the patient to place oxygen on. Patient refusing oxygen at this time. Discussed plan of care. Primary nurse and Dr. Vargas left the room.     Patient pressed call light, primary RN entered the room, patient said \"I need to speak to the doctor.\" Primary RN, Dr. Vargas, and charge nurse Pepito entered patient room 2 minutes later. Upon entering room patient shouting \"Why do I need to wear oxygen?\" Dr. Vargas tried to explain that part of the treatment for sickle cell crisis is to use oxygen first. Dr. Vargas described plan of care after repeating himself several different times related to interruptions. Patient became very agitated ans started yelling at her mom. Patient agreeable to put the oxygen back on and to let lab draw for blood cultures. Patient agreeable to complete her own COVID swab.     When re entering the room patient had oxygen off saturation in the mid 60s. Primary RN requested patient place her oxygen back on. Switched to sticky finger pulse ox prob. Patient did eventually place oxygen back on. Patient saturation in the low 90s upon exiting the room.   "

## 2023-12-01 NOTE — ASSESSMENT & PLAN NOTE
Currently hemoglobin is 8.1 with hematocrit 23.7  No indication for transfusion.    12/1: Hb is 6.4 this morning. We have ordered transfusion. I have ordered 2 U as it seems it is difficult to obtain patient's blood type. Monitor closely for any signs of decompensation. We will repeat hemoglobin later this afternoon, we would try to get hemoglobin after patient's transfusion.    12/2: The patient received 2 units of blood yesterday.  Hemoglobin this morning is 7.7.  We will repeat hemoglobin at noon to see if she would warrant further blood transfusion.  If it is more than 7 we will repeat hemoglobin in the afternoon as well.    12/3 Patient received 2 more unites overnight, hgb 9.4 and repeat 9.5, patient stable for discharge with pain medication

## 2023-12-01 NOTE — DIETARY
"Nutrition services: Day 1 of admit.  Henry Anand is a 23 y.o. female with admitting DX of Sickle cell crisis (HCC) [D57.00]     Consult received for MST 3: unplanned wt loss 24-33 lb x 3 months, no poor PO intake PTA.    Assessment:  Height: 157.5 cm (5' 2\")  Weight: 58.3 kg (128 lb 8.5 oz)  Body mass index is 23.51 kg/m²., BMI classification: Normal  Diet/Intake: Regular; <25% x 1 meal per ADLs    Evaluation:   PMHx includes asthma, gallstones w/ biliary obstruction, heart murmer, sickle cell anemia. Pt admitted w/ dx of sickle cell crisis, normocytic anemia, leukocytosis, cannabis abuse, anxiety w/ depression  Labs: glu 117, BUN 5, crea 0.35, Ca 8.2  MAR: Valium PRN, folvite, dilaudid PCA, hydroxyurea, NS @125 ml/hr, PRN zofran, pericolace (refused), cholecalciferol  Last BM: 11/29  No documented edema  Wt hx per chart review indicates insignificant wt loss of 6.5% over the past year, recently up ~2 lb:  11/30/23 54.0 kg (119 lb 0 oz) - via stand up scale  10/07/23 53.2 kg (117 lb 4.6 oz)  05/25/23 55.3 kg (121 lb 14.6 oz)  04/11/23 55.5 kg (122 lb 5.7 oz)  12/18/22 56.9 kg (125 lb 7.1 oz)    Malnutrition Risk: Insignificant wt loss per chart hx. BMI is WNL. Pt indicates in admit screen that she had no poor PO intake r/t decreased appetite prior to admit.    Recommendations/Plan:  Encourage intake of meals >75%.  Document intake of all PO as % taken in ADL's to provide interdisciplinary communication across all shifts.   Monitor weight.  Nutrition rep will continue to see patient for ongoing meal and snack preferences.   RD following informally to monitor pt's PO intake to ensure adequate intake vs need for interventions this admit. Will rescreen weekly per policy.        "

## 2023-12-01 NOTE — PROGRESS NOTES
Monitor Summary:  Rhythm: SR/ST  Rate: patient refused tele at start of shift/telemonitors unable to verify rate of SR   ST recorded in 110's  Ectopy: none  0.18/ 0.08/ 0.34    No monitor strip available

## 2023-12-01 NOTE — PROGRESS NOTES
Chip from Lab called with critical result of HCT at 17.8 at 0714. Critical lab result read back to Chip.   Dr. Vargas notified of critical lab result at 0716.  Critical lab result read back by Dr. Vargas.

## 2023-12-01 NOTE — PROGRESS NOTES
Frequent rounding on patient by Primary RN and CNA, patient requiring frequent reminders to leave oxygen on. Bubbler placed for patient comfort as well as Mepilex on the upper lip in an attempt to make the oxygen more comfortable.

## 2023-12-01 NOTE — ASSESSMENT & PLAN NOTE
Leukocytosis is chronic and the patient does not appear to have an acute infection.  Continue to monitor white blood cell count  Dc antibiotics

## 2023-12-01 NOTE — ASSESSMENT & PLAN NOTE
History of PTSD currently not on any medications for it and the patient does not seem to have an acute exacerbation noted.

## 2023-12-01 NOTE — H&P
Hospital Medicine History & Physical Note    Date of Service  11/30/2023    Primary Care Physician  Cullen Castro M.D.    Consultants  None    Specialist Names: None    Code Status  Full Code    Chief Complaint  Chief Complaint   Patient presents with    Low Back Pain     Pt has hx of sickle cell. Pt states she did have a fall about a month ago and fell onto her buttock. Pt also states she fell approx 2 days ago again onto her buttocks. Pt does endorses some pain down both legs and some tingling in her feet. No neuro deficits noted. Pt recd 2.5mg morphine and 250 ml IVF PTA. Pt states pain still 9/10 lower back and in joints. Pt placed on 2L NC for comfort.        History of Presenting Illness  Henry Anand is a 23 y.o. female who presented 11/30/2023 with severe pain in her lower extremity as severe anxiety.  Patient has sickle cell disease and at this point feels like she is going into sickle cell crisis.  Hemoglobin stable at 8.1.  White blood cell count is elevated.  Patient's pain is out of proportion to the acute findings.  Patient's pain needs to be managed with a PCA pump.  Patient will be given fluid resuscitation.  Will monitor hemoglobin to ensure that the patient does not drop her counts and if she does drop we will need to transfuse her..    I discussed the plan of care with patient, family, bedside RN, and emergency room physician Dr. Jacinto .    Review of Systems  Review of Systems   Constitutional: Negative.  Negative for chills, diaphoresis and fever.   HENT: Negative.  Negative for hearing loss, nosebleeds and sore throat.    Eyes: Negative.  Negative for blurred vision and double vision.   Respiratory: Negative.  Negative for cough, hemoptysis and wheezing.    Cardiovascular: Negative.  Negative for chest pain, palpitations and leg swelling.   Gastrointestinal: Negative.  Negative for abdominal pain, blood in stool, constipation, diarrhea, heartburn, nausea and vomiting.    Genitourinary: Negative.  Negative for frequency, hematuria and urgency.   Musculoskeletal:  Positive for myalgias (Severe and all over mostly affecting her pelvis and lower extremities). Negative for joint pain.   Skin: Negative.  Negative for itching and rash.   Neurological:  Positive for headaches. Negative for dizziness, focal weakness, seizures and loss of consciousness.   Endo/Heme/Allergies: Negative.  Does not bruise/bleed easily.   Psychiatric/Behavioral:  Positive for depression and substance abuse. Negative for suicidal ideas. The patient is nervous/anxious and has insomnia.    All other systems reviewed and are negative.      Past Medical History   has a past medical history of Asthma (2021), Gallstones with biliary obstruction (11/2014), Heart murmur, and Sickle cell anemia without crisis (HCC) (2001).    Surgical History   has a past surgical history that includes cholecystectomy (11/3/2014); ercp (N/A, 10/30/2014); other (2018); septoturbinoplasty (Bilateral, 5/4/2021); antrostomy (5/4/2021); and ethmoidectomy, endoscopic (5/4/2021).     Family History  family history includes Anemia in her sister; Sleep Apnea in her brother, father, and mother; Stroke in her sister.   Family history reviewed with patient. There is family history that is pertinent to the chief complaint.     Social History   reports that she has never smoked. She has never used smokeless tobacco. She reports that she does not currently use alcohol. She reports current drug use. Drug: Inhaled.    Allergies  Allergies   Allergen Reactions    Haloperidol Unspecified     Received Haldol x 2 doses in [facility] and developed an acute dystonic reaction, treated with diphenhydramine       Medications  Prior to Admission Medications   Prescriptions Last Dose Informant Patient Reported? Taking?   Cholecalciferol (VITAMIN D3) 125 MCG (5000 UT) Cap FEW DAYS AGO at [facility] Patient Yes No   Sig: Take 5,000 Units by mouth every day.   albuterol  108 (90 Base) MCG/ACT Aero Soln inhalation aerosol PRN at [facility] Patient No No   Sig: Inhale 2 Puffs every 6 hours as needed for Shortness of Breath.   folic acid (FOLVITE) 1 MG Tab FEW  DAYS AGO at [facility] Patient No No   Sig: Take 1 Tab by mouth every day.   hydroxyurea (HYDREA) 500 MG Cap FEW WEEKS AGO at [facility] Patient Yes No   Sig: Take 1,500 mg by mouth every day.   ibuprofen (MOTRIN) 600 MG Tab FEW DAYS AGO at [facility] Patient No No   Sig: Take 1 Tablet by mouth every 6 hours as needed for Mild Pain or Moderate Pain.   penicillin v potassium (VEETID) 250 MG Tab FEW DAYS AGO at [facility] Patient No No   Sig: Take 1 Tab by mouth 2 times a day.      Facility-Administered Medications: None       Physical Exam  Temp:  [36.5 °C (97.7 °F)] 36.5 °C (97.7 °F)  Pulse:  [84] 84  Resp:  [18] 18  BP: (112)/(60-65) 112/65  SpO2:  [98 %] 98 %  Blood Pressure: 112/65   Temperature: 36.5 °C (97.7 °F)   Pulse: 84   Respiration: 18   Pulse Oximetry: 98 %       Physical Exam  Constitutional:       General: She is in acute distress.      Appearance: Normal appearance. She is well-developed and underweight. She is ill-appearing.   HENT:      Head: Normocephalic and atraumatic.      Right Ear: Tympanic membrane, ear canal and external ear normal.      Left Ear: Tympanic membrane, ear canal and external ear normal.      Nose: Nose normal. No congestion or rhinorrhea.      Mouth/Throat:      Mouth: Mucous membranes are moist.      Pharynx: Oropharynx is clear.   Eyes:      Extraocular Movements: Extraocular movements intact.      Conjunctiva/sclera: Conjunctivae normal.      Pupils: Pupils are equal, round, and reactive to light.   Neck:      Thyroid: No thyroid mass.      Vascular: No carotid bruit or JVD.   Cardiovascular:      Rate and Rhythm: Regular rhythm. Tachycardia present.      Pulses: Normal pulses.      Heart sounds: Normal heart sounds, S1 normal and S2 normal.   Pulmonary:      Effort: Pulmonary effort is normal.      Breath sounds: Normal  breath sounds and air entry.   Abdominal:      General: Abdomen is flat. Bowel sounds are normal.      Palpations: Abdomen is soft.   Musculoskeletal:         General: Tenderness (In the thighs, and calves and buttocks.) present. Normal range of motion.      Cervical back: Normal range of motion and neck supple. No edema or rigidity.      Right lower leg: No edema.      Left lower leg: No edema.      Right foot: Normal range of motion. No deformity or foot drop.      Left foot: Normal range of motion. No deformity or foot drop.   Feet:      Right foot:      Skin integrity: Skin integrity normal. No ulcer.      Left foot:      Skin integrity: Skin integrity normal. No ulcer.   Lymphadenopathy:      Head:      Right side of head: No submental adenopathy.      Left side of head: No submental adenopathy.      Cervical: No cervical adenopathy.      Right cervical: No superficial cervical adenopathy.     Left cervical: No superficial cervical adenopathy.      Upper Body:      Right upper body: No supraclavicular adenopathy.      Left upper body: No supraclavicular adenopathy.      Lower Body: No right inguinal adenopathy. No left inguinal adenopathy.   Skin:     General: Skin is warm and dry.      Capillary Refill: Capillary refill takes less than 2 seconds.      Findings: No abrasion or wound.   Neurological:      General: No focal deficit present.      Mental Status: She is alert and oriented to person, place, and time. Mental status is at baseline.      GCS: GCS eye subscore is 4. GCS verbal subscore is 5. GCS motor subscore is 6.      Sensory: Sensation is intact.      Motor: Motor function is intact. No weakness.      Coordination: Coordination is intact.   Psychiatric:         Attention and Perception: Attention normal.         Mood and Affect: Mood is anxious. Affect is angry.         Speech: Speech is rapid and pressured.         Behavior: Behavior is aggressive and combative. Behavior is cooperative.          "Thought Content: Thought content normal.         Cognition and Memory: Cognition and memory normal.         Judgment: Judgment is impulsive.         Laboratory:  Recent Labs     11/30/23  1406   WBC 15.4*   RBC 2.51*   HEMOGLOBIN 8.1*   HEMATOCRIT 23.7*   MCV 94.4   MCH 32.3   MCHC 34.2   RDW 73.6*   PLATELETCT 298   MPV 9.5     Recent Labs     11/30/23  1406   SODIUM 138   POTASSIUM 4.6   CHLORIDE 105   CO2 23   GLUCOSE 84   BUN 9   CREATININE 0.51   CALCIUM 8.6     Recent Labs     11/30/23  1406   ALTSGPT 20   ASTSGOT 28   ALKPHOSPHAT 82   TBILIRUBIN 1.5   GLUCOSE 84         No results for input(s): \"NTPROBNP\" in the last 72 hours.      No results for input(s): \"TROPONINT\" in the last 72 hours.    Imaging:  DX-LUMBAR SPINE-2 OR 3 VIEWS   Final Result      1.  No evidence of lumbar fracture or significant degenerative disk disease.      2.  Convex right scoliotic curvature.          X-Ray:  I have personally reviewed the images and compared with prior images.  EKG:  I have personally reviewed the images and compared with prior images.    Assessment/Plan:  Justification for Admission Status  I anticipate this patient will require at least two midnights for appropriate medical management, necessitating inpatient admission because patient has sickle cell crisis and at this point will need at least 48 hours of pain management and fluid resuscitation.  Patient will be started on a PCA pump    Patient will need a Telemetry bed on MEDICAL service .  The need is secondary to sickle cell crisis.    * Sickle cell crisis (HCC)- (present on admission)  Assessment & Plan  Patient with known sickle cell disease has developed sickle cell crisis with severe pain and has come to the emergency room for evaluation.  Hemoglobin at this point stable at 8 currently no transfusion indicated.  Patient will need pain management and thus I am initiating her on a morphine PCA.  Fluid resuscitation  Monitor H&H  Monitor Alec event  Monitor " electrolytes and correct as needed    Normocytic anemia- (present on admission)  Assessment & Plan  Currently hemoglobin is 8.1 with hematocrit 23.7  No indication for transfusion.    Leukocytosis- (present on admission)  Assessment & Plan  Leukocytosis is chronic and the patient does not appear to have an acute infection.  Continue to monitor white blood cell count    Full code status  Assessment & Plan  As discussed with patient and mother full CODE STATUS at this point    PTSD (post-traumatic stress disorder)- (present on admission)  Assessment & Plan  History of PTSD currently not on any medications for it and the patient does not seem to have an acute exacerbation noted.    Mild persistent asthma without complication- (present on admission)  Assessment & Plan  RT protocol  Nebulizer treatments  Oxygen as needed    Cannabis abuse- (present on admission)  Assessment & Plan  Cannabis use as an outpatient she will not be allowed to use cannabis here.    Anxiety associated with depression- (present on admission)  Assessment & Plan  Chronic anxiety and without any anxiolytic use at home.  Utilize Valium for anxiolytic management        VTE prophylaxis: heparin ppx

## 2023-12-01 NOTE — PROGRESS NOTES
4 Eyes Skin Assessment Completed by BRITTANY Townsend and BRITTANY Gr.    Head WDL  Ears WDL  Nose WDL  Mouth WDL  Neck WDL  Breast/Chest WDL  Shoulder Blades WDL  Spine WDL  (R) Arm/Elbow/Hand WDL  (L) Arm/Elbow/Hand WDL  Abdomen Scar  Groin WDL  Scrotum/Coccyx/Buttocks WDL  (R) Leg WDL  (L) Leg WDL  (R) Heel/Foot/Toe WDL  (L) Heel/Foot/Toe WDL          Devices In Places Tele Box and Pulse Ox, PIV      Interventions In Place Gray Ear Foams and Pillows    Possible Skin Injury No    Pictures Uploaded Into Epic N/A  Wound Consult Placed N/A  RN Wound Prevention Protocol Ordered No

## 2023-12-01 NOTE — ASSESSMENT & PLAN NOTE
RT protocol  Nebulizer treatments  Oxygen as needed - patient frequently refuses oxygen despite frequent education regarding hypoxia

## 2023-12-01 NOTE — PROGRESS NOTES
Patient arrived to floor at 1930 tearful and reporting 10/10 pain in bilateral flank areas. Vitals taken. Patient Morphine PCA started.       2020: Patient PCA not providing adequate pain relief to patient; MD Rose contacted and received orders to increase morphine PCA.    2051: Patient continues to cry out and demanding doctor at bedside and requesting Dilaudid PCA. Received orders to switch PCA to dilaudid drip. PCA started at 2119.    2200: Patient reporting 6/7 out of 10 pain and appears calm and pleasant to staff at this time.

## 2023-12-01 NOTE — CARE PLAN
The patient is Stable - Low risk of patient condition declining or worsening    Shift Goals  Clinical Goals: pain mgmt  Patient Goals: pain mgmt    Progress made toward(s) clinical / shift goals:  Patient started on diluadid PCA with adequate pain relief. Patient reporting pain 4-5 out of 10. Patient ambulated around floor without difficulty.      Problem: Pain - Standard  Goal: Alleviation of pain or a reduction in pain to the patient’s comfort goal  Outcome: Progressing     Problem: Knowledge Deficit - Standard  Goal: Patient and family/care givers will demonstrate understanding of plan of care, disease process/condition, diagnostic tests and medications  Outcome: Progressing       Patient is not progressing towards the following goals:

## 2023-12-01 NOTE — ASSESSMENT & PLAN NOTE
"As per previous hospitalist:  \"As discussed with patient and mother full CODE STATUS at this point\"  "

## 2023-12-02 ENCOUNTER — APPOINTMENT (OUTPATIENT)
Dept: RADIOLOGY | Facility: MEDICAL CENTER | Age: 23
DRG: 812 | End: 2023-12-02
Attending: HOSPITALIST
Payer: MEDICAID

## 2023-12-02 PROBLEM — E83.42 HYPOMAGNESEMIA: Status: ACTIVE | Noted: 2023-12-02

## 2023-12-02 LAB
ALBUMIN SERPL BCP-MCNC: 3.5 G/DL (ref 3.2–4.9)
ALBUMIN/GLOB SERPL: 1.3 G/DL
ALP SERPL-CCNC: 88 U/L (ref 30–99)
ALT SERPL-CCNC: 21 U/L (ref 2–50)
ANION GAP SERPL CALC-SCNC: 9 MMOL/L (ref 7–16)
AST SERPL-CCNC: 37 U/L (ref 12–45)
BILIRUB SERPL-MCNC: 1.9 MG/DL (ref 0.1–1.5)
BUN SERPL-MCNC: 3 MG/DL (ref 8–22)
CALCIUM ALBUM COR SERPL-MCNC: 8.3 MG/DL (ref 8.5–10.5)
CALCIUM SERPL-MCNC: 7.9 MG/DL (ref 8.4–10.2)
CHLORIDE SERPL-SCNC: 103 MMOL/L (ref 96–112)
CO2 SERPL-SCNC: 24 MMOL/L (ref 20–33)
CREAT SERPL-MCNC: 0.43 MG/DL (ref 0.5–1.4)
ERYTHROCYTE [DISTWIDTH] IN BLOOD BY AUTOMATED COUNT: 71.4 FL (ref 35.9–50)
GFR SERPLBLD CREATININE-BSD FMLA CKD-EPI: 141 ML/MIN/1.73 M 2
GLOBULIN SER CALC-MCNC: 2.7 G/DL (ref 1.9–3.5)
GLUCOSE SERPL-MCNC: 92 MG/DL (ref 65–99)
HCT VFR BLD AUTO: 19.4 % (ref 37–47)
HCT VFR BLD AUTO: 20.3 % (ref 37–47)
HCT VFR BLD AUTO: 21.3 % (ref 37–47)
HGB BLD-MCNC: 7.1 G/DL (ref 12–16)
HGB BLD-MCNC: 7.4 G/DL (ref 12–16)
HGB BLD-MCNC: 7.7 G/DL (ref 12–16)
MAGNESIUM SERPL-MCNC: 1.7 MG/DL (ref 1.5–2.5)
MCH RBC QN AUTO: 32.2 PG (ref 27–33)
MCHC RBC AUTO-ENTMCNC: 36.2 G/DL (ref 32.2–35.5)
MCV RBC AUTO: 89.1 FL (ref 81.4–97.8)
PHOSPHATE SERPL-MCNC: 2.6 MG/DL (ref 2.5–4.5)
PLATELET # BLD AUTO: 236 K/UL (ref 164–446)
PMV BLD AUTO: 9.4 FL (ref 9–12.9)
POTASSIUM SERPL-SCNC: 3.5 MMOL/L (ref 3.6–5.5)
PROT SERPL-MCNC: 6.2 G/DL (ref 6–8.2)
RBC # BLD AUTO: 2.39 M/UL (ref 4.2–5.4)
SODIUM SERPL-SCNC: 136 MMOL/L (ref 135–145)
WBC # BLD AUTO: 18.3 K/UL (ref 4.8–10.8)

## 2023-12-02 PROCEDURE — 80053 COMPREHEN METABOLIC PANEL: CPT

## 2023-12-02 PROCEDURE — 94760 N-INVAS EAR/PLS OXIMETRY 1: CPT

## 2023-12-02 PROCEDURE — 85018 HEMOGLOBIN: CPT

## 2023-12-02 PROCEDURE — 700102 HCHG RX REV CODE 250 W/ 637 OVERRIDE(OP)

## 2023-12-02 PROCEDURE — 86923 COMPATIBILITY TEST ELECTRIC: CPT

## 2023-12-02 PROCEDURE — 99233 SBSQ HOSP IP/OBS HIGH 50: CPT | Performed by: INTERNAL MEDICINE

## 2023-12-02 PROCEDURE — 93971 EXTREMITY STUDY: CPT | Mod: RT

## 2023-12-02 PROCEDURE — 700105 HCHG RX REV CODE 258: Performed by: INTERNAL MEDICINE

## 2023-12-02 PROCEDURE — 86902 BLOOD TYPE ANTIGEN DONOR EA: CPT

## 2023-12-02 PROCEDURE — 85014 HEMATOCRIT: CPT | Mod: 91

## 2023-12-02 PROCEDURE — 700102 HCHG RX REV CODE 250 W/ 637 OVERRIDE(OP): Performed by: HOSPITALIST

## 2023-12-02 PROCEDURE — 83735 ASSAY OF MAGNESIUM: CPT

## 2023-12-02 PROCEDURE — 36415 COLL VENOUS BLD VENIPUNCTURE: CPT

## 2023-12-02 PROCEDURE — 770020 HCHG ROOM/CARE - TELE (206)

## 2023-12-02 PROCEDURE — 36430 TRANSFUSION BLD/BLD COMPNT: CPT

## 2023-12-02 PROCEDURE — A9270 NON-COVERED ITEM OR SERVICE: HCPCS | Performed by: INTERNAL MEDICINE

## 2023-12-02 PROCEDURE — 84100 ASSAY OF PHOSPHORUS: CPT

## 2023-12-02 PROCEDURE — A9270 NON-COVERED ITEM OR SERVICE: HCPCS

## 2023-12-02 PROCEDURE — 700101 HCHG RX REV CODE 250: Performed by: HOSPITALIST

## 2023-12-02 PROCEDURE — 700111 HCHG RX REV CODE 636 W/ 250 OVERRIDE (IP): Performed by: HOSPITALIST

## 2023-12-02 PROCEDURE — 700102 HCHG RX REV CODE 250 W/ 637 OVERRIDE(OP): Performed by: INTERNAL MEDICINE

## 2023-12-02 PROCEDURE — P9016 RBC LEUKOCYTES REDUCED: HCPCS

## 2023-12-02 PROCEDURE — A9270 NON-COVERED ITEM OR SERVICE: HCPCS | Performed by: HOSPITALIST

## 2023-12-02 PROCEDURE — 85027 COMPLETE CBC AUTOMATED: CPT

## 2023-12-02 PROCEDURE — 700111 HCHG RX REV CODE 636 W/ 250 OVERRIDE (IP): Mod: JZ | Performed by: INTERNAL MEDICINE

## 2023-12-02 RX ORDER — CYCLOBENZAPRINE HCL 10 MG
10 TABLET ORAL 3 TIMES DAILY PRN
Status: DISCONTINUED | OUTPATIENT
Start: 2023-12-02 | End: 2023-12-04 | Stop reason: HOSPADM

## 2023-12-02 RX ORDER — HYDROXYZINE HYDROCHLORIDE 25 MG/1
TABLET, FILM COATED ORAL
Status: COMPLETED
Start: 2023-12-02 | End: 2023-12-02

## 2023-12-02 RX ORDER — HYDROMORPHONE HYDROCHLORIDE 1 MG/ML
1 INJECTION, SOLUTION INTRAMUSCULAR; INTRAVENOUS; SUBCUTANEOUS ONCE
Status: COMPLETED | OUTPATIENT
Start: 2023-12-02 | End: 2023-12-02

## 2023-12-02 RX ORDER — MAGNESIUM SULFATE HEPTAHYDRATE 40 MG/ML
2 INJECTION, SOLUTION INTRAVENOUS ONCE
Status: COMPLETED | OUTPATIENT
Start: 2023-12-02 | End: 2023-12-02

## 2023-12-02 RX ORDER — DIPHENHYDRAMINE HCL 25 MG
25 TABLET ORAL ONCE
Status: COMPLETED | OUTPATIENT
Start: 2023-12-02 | End: 2023-12-02

## 2023-12-02 RX ORDER — HYDROXYZINE HYDROCHLORIDE 25 MG/1
25 TABLET, FILM COATED ORAL EVERY 6 HOURS PRN
Status: DISCONTINUED | OUTPATIENT
Start: 2023-12-03 | End: 2023-12-04 | Stop reason: HOSPADM

## 2023-12-02 RX ORDER — HYDROXYUREA 500 MG/1
1500 CAPSULE ORAL
Status: DISCONTINUED | OUTPATIENT
Start: 2023-12-03 | End: 2023-12-04 | Stop reason: HOSPADM

## 2023-12-02 RX ORDER — LORATADINE 10 MG/1
10 TABLET ORAL DAILY
Status: DISCONTINUED | OUTPATIENT
Start: 2023-12-02 | End: 2023-12-04 | Stop reason: HOSPADM

## 2023-12-02 RX ORDER — HYDROXYZINE HYDROCHLORIDE 25 MG/1
25 TABLET, FILM COATED ORAL 3 TIMES DAILY PRN
Status: DISCONTINUED | OUTPATIENT
Start: 2023-12-02 | End: 2023-12-02

## 2023-12-02 RX ORDER — POTASSIUM CHLORIDE 20 MEQ/1
20 TABLET, EXTENDED RELEASE ORAL ONCE
Status: COMPLETED | OUTPATIENT
Start: 2023-12-02 | End: 2023-12-02

## 2023-12-02 RX ADMIN — PENICILLIN V POTASSIUM 250 MG: 500 TABLET, FILM COATED ORAL at 05:26

## 2023-12-02 RX ADMIN — CEFTRIAXONE SODIUM 1000 MG: 1 INJECTION, POWDER, FOR SOLUTION INTRAMUSCULAR; INTRAVENOUS at 05:30

## 2023-12-02 RX ADMIN — Medication 5000 UNITS: at 05:18

## 2023-12-02 RX ADMIN — HYDROXYZINE HYDROCHLORIDE 25 MG: 25 TABLET, FILM COATED ORAL at 23:13

## 2023-12-02 RX ADMIN — ACETAMINOPHEN 1000 MG: 500 TABLET ORAL at 12:17

## 2023-12-02 RX ADMIN — FOLIC ACID 1 MG: 1 TABLET ORAL at 05:19

## 2023-12-02 RX ADMIN — KETOROLAC TROMETHAMINE 15 MG: 30 INJECTION, SOLUTION INTRAMUSCULAR; INTRAVENOUS at 00:03

## 2023-12-02 RX ADMIN — DOCUSATE SODIUM 50MG AND SENNOSIDES 8.6MG 2 TABLET: 8.6; 5 TABLET, FILM COATED ORAL at 17:39

## 2023-12-02 RX ADMIN — HYDROMORPHONE HYDROCHLORIDE 1 MG: 1 INJECTION, SOLUTION INTRAMUSCULAR; INTRAVENOUS; SUBCUTANEOUS at 23:10

## 2023-12-02 RX ADMIN — Medication: at 08:31

## 2023-12-02 RX ADMIN — Medication: at 17:39

## 2023-12-02 RX ADMIN — SODIUM CHLORIDE: 9 INJECTION, SOLUTION INTRAVENOUS at 04:07

## 2023-12-02 RX ADMIN — CYCLOBENZAPRINE 10 MG: 10 TABLET, FILM COATED ORAL at 02:06

## 2023-12-02 RX ADMIN — Medication: at 13:45

## 2023-12-02 RX ADMIN — AZITHROMYCIN DIHYDRATE 500 MG: 250 TABLET ORAL at 05:19

## 2023-12-02 RX ADMIN — MENTHOL AND METHYL SALICYLATE: 7.6; 29 OINTMENT TOPICAL at 20:26

## 2023-12-02 RX ADMIN — POTASSIUM CHLORIDE 20 MEQ: 1500 TABLET, EXTENDED RELEASE ORAL at 13:54

## 2023-12-02 RX ADMIN — CYCLOBENZAPRINE 10 MG: 10 TABLET, FILM COATED ORAL at 08:04

## 2023-12-02 RX ADMIN — MENTHOL AND METHYL SALICYLATE: 7.6; 29 OINTMENT TOPICAL at 18:06

## 2023-12-02 RX ADMIN — LIDOCAINE 1 PATCH: 50 PATCH TOPICAL at 00:04

## 2023-12-02 RX ADMIN — PENICILLIN V POTASSIUM 250 MG: 500 TABLET, FILM COATED ORAL at 20:11

## 2023-12-02 RX ADMIN — ACETAMINOPHEN 1000 MG: 500 TABLET ORAL at 00:04

## 2023-12-02 RX ADMIN — Medication: at 19:15

## 2023-12-02 RX ADMIN — ACETAMINOPHEN 1000 MG: 500 TABLET ORAL at 17:39

## 2023-12-02 RX ADMIN — SODIUM CHLORIDE: 9 INJECTION, SOLUTION INTRAVENOUS at 22:05

## 2023-12-02 RX ADMIN — ACETAMINOPHEN 1000 MG: 500 TABLET ORAL at 05:18

## 2023-12-02 RX ADMIN — DIAZEPAM 2 MG: 2 TABLET ORAL at 10:24

## 2023-12-02 RX ADMIN — Medication 1 LOZENGE: at 18:06

## 2023-12-02 RX ADMIN — DIPHENHYDRAMINE HYDROCHLORIDE 25 MG: 25 TABLET ORAL at 01:17

## 2023-12-02 RX ADMIN — MAGNESIUM SULFATE HEPTAHYDRATE 2 G: 2 INJECTION, SOLUTION INTRAVENOUS at 13:58

## 2023-12-02 RX ADMIN — LIDOCAINE 1 PATCH: 50 PATCH TOPICAL at 22:57

## 2023-12-02 RX ADMIN — CYCLOBENZAPRINE 10 MG: 10 TABLET, FILM COATED ORAL at 17:39

## 2023-12-02 RX ADMIN — Medication: at 20:21

## 2023-12-02 RX ADMIN — MENTHOL AND METHYL SALICYLATE: 7.6; 29 OINTMENT TOPICAL at 23:20

## 2023-12-02 RX ADMIN — HYDROXYZINE HYDROCHLORIDE 25 MG: 25 TABLET, FILM COATED ORAL at 20:08

## 2023-12-02 RX ADMIN — HYDROXYZINE HYDROCHLORIDE 25 MG: 25 TABLET, FILM COATED ORAL at 12:16

## 2023-12-02 RX ADMIN — SODIUM CHLORIDE: 9 INJECTION, SOLUTION INTRAVENOUS at 13:51

## 2023-12-02 RX ADMIN — DIPHENHYDRAMINE HYDROCHLORIDE 25 MG: 25 TABLET ORAL at 06:04

## 2023-12-02 RX ADMIN — ALBUTEROL SULFATE 2 PUFF: 90 AEROSOL, METERED RESPIRATORY (INHALATION) at 00:03

## 2023-12-02 RX ADMIN — LORATADINE 10 MG: 10 TABLET ORAL at 23:20

## 2023-12-02 RX ADMIN — HYDROXYUREA 1500 MG: 500 CAPSULE ORAL at 05:20

## 2023-12-02 RX ADMIN — Medication: at 02:22

## 2023-12-02 ASSESSMENT — PAIN DESCRIPTION - PAIN TYPE
TYPE: ACUTE PAIN
TYPE: ACUTE PAIN

## 2023-12-02 NOTE — PROGRESS NOTES
OVERNIGHT HOSPITALIST:    Paged by nursing Staff multiple times this evening.  Patient complaining of increasing pain to her right lower leg.  I came to the room to examine the patient.  Her pain was mostly on anterior aspect of her tibia, deep pain.  Her leg is warm to touch, she has good pedal pulses bilaterally.    I called pharmacy and switch as needed Tylenol to schedule, 1000 mg every 6 hours in addition to 1 dose of Toradol.  She also has lidocaine patch added to the leg.  Overnight, she had itchiness and had Benadryl orally as well.    I also added right lower extremity DVT even though location of leg pain is not typical for DVT but will need to rule out.

## 2023-12-02 NOTE — CARE PLAN
The patient is Stable - Low risk of patient condition declining or worsening    Shift Goals  Clinical Goals: oxygen saturation greater than 90%, pain management, hgb greater than 7  Patient Goals: control pain    Progress made toward(s) clinical / shift goals:  NA    Patient is not progressing towards the following goals:      Problem: Pain - Standard  Goal: Alleviation of pain or a reduction in pain to the patient’s comfort goal  Outcome: Not Progressing  Note: Dr. Cisneros ordered new pain management.      Problem: Psychosocial  Goal: Patient's level of anxiety will decrease  Outcome: Not Progressing  Note: Anxiety, restlessness, aggressiveness, and pacing due to pain.

## 2023-12-02 NOTE — PROGRESS NOTES
Primary RN retrieved blood from lab and entered patient room to hang blood. Patient agitated, raising her voice and speaking with her mom. Primary RN explained that the blood was ready to be hung. Patient refusing to accept the blood transfusion until she could speak with a doctor. Pepito, charge nurse also present. Primary RN then returned blood promptly to lab. Dr. Vargas paged, patient refusing to speak with him on the phone requesting a doctor in person. Dr. Vargas then spoke with Dr. Rose.Dr. Rose came to speak with patient. Plan of care addressed again. Patient agreeable to receive blood. Primary RN called lab to see if the unit of blood could be retrieved again. Lab reported that they had to waste the unit of blood due to temperature being too high. Second and only remaining unit of blood was hung. Dr. Rose notified of the unit of wasted blood, blood received from [place] today.

## 2023-12-02 NOTE — PROGRESS NOTES
"Hospital Medicine Daily Progress Note    Date of Service  12/2/2023    Chief Complaint  Henry Anand is a 23 y.o. female admitted 11/30/2023 with low back pain.    Hospital Course  As per chart review:  \"23 y.o. female who presented 11/30/2023 with severe pain in her lower extremity as severe anxiety.  Patient has sickle cell disease and at this point feels like she is going into sickle cell crisis.  Hemoglobin stable at 8.1.  White blood cell count is elevated.  Patient's pain is out of proportion to the acute findings.  Patient's pain needs to be managed with a PCA pump.  Patient will be given fluid resuscitation.  Will monitor hemoglobin to ensure that the patient does not drop her counts and if she does drop we will need to transfuse her.\"    Interval Problem Update  12/1: Patient seen at bedside this morning.  Patient is very visibly anxious.  On examination the patient is desaturating, however she is refusing to place oxygen even after multiple attempts at explaining the importance of oxygen.  She just kept on refusing using oxygen.  Mother and charge nurse as well as bedside nurse present at the time of my interaction with the patient.  Hemoglobin this morning dropped to 6.4.  We have ordered blood transfusion, we are pending the patient to get the blood transfusion.  Will repeat hemoglobin after the blood transfusion.  For now we will continue to monitor vital signs closely.  I did discuss with hematology as a curbside further treatment options.  For now we will continue with hydroxyurea, IV fluids, pain management and started on antibiotics empirically.  Continue to monitor closely.    12/2: Patient seen at bedside this morning.  Patient still visibly anxious.  She has been very rude and using foul language towards staff, including myself.  She has been refusing treatment including oxygen, antibiotics, and yesterday she was refusing blood transfusion.  On-call physician had to come at " bedside to convince her to get blood transfusion even though we had a long discussion yesterday morning about the importance of following medical recommendations.  Today she seems to be still very anxious, not amenable to have a meaningful conversation with myself.  She does complain of pain.  I tried to explain to her again with the mother present about the importance of following medical recommendations.  She does not seem to be carrying what I had to say, I then talked to the mother and she understood the treatment required.  The mother said that she will talk to her daughter to try to convince her to keep getting the appropriate medical care.  Hemoglobin this morning is 7.7, we will repeat hemoglobin at noon if less than 7 we will order repeat blood transfusion.  If more than 7 we will repeat hemoglobin later this afternoon.  For now we will continue with IV fluids, hydroxyurea, pain management and blood transfusion as needed.  We have also started patient on antibiotics, as per up-to-date ceftriaxone and azithromycin are recommended empirically.  Patient with increased white blood cell count as well as fever while hospitalized.  We are following blood cultures as well.    --UPDATE: Repeat HB from noon is 7.4. We will repeat Hemoglobin later today and monitor closely.    I have discussed this patient's plan of care and discharge plan at IDT rounds today with Case Management, Nursing, Nursing leadership, and other members of the IDT team.    Consultants/Specialty      Code Status  Full Code    Disposition  The patient is not medically cleared for discharge to home or a post-acute facility.      I have placed the appropriate orders for post-discharge needs.    Review of Systems  Patient complaining of pain.,  However she was not amenable to have meaningful conversation with me.  The patient seems to be very anxious and she is also using very foul language and being very disrespectful with staff including  myself.    Physical Exam  Temp:  [37.2 °C (98.9 °F)-38 °C (100.4 °F)] 37.7 °C (99.9 °F)  Pulse:  [] 102  Resp:  [16-20] 18  BP: (108-129)/(56-75) 114/56  SpO2:  [81 %-100 %] 92 %    The patient was not amenable of myself performing physical examination today.  The patient is being very rude towards staff including myself.  She kept on telling me to get out of the room.  Nursing staff present at all times with me at bedside while interacting with this patient.      Fluids    Intake/Output Summary (Last 24 hours) at 12/2/2023 1229  Last data filed at 12/1/2023 2131  Gross per 24 hour   Intake 472.5 ml   Output --   Net 472.5 ml         Laboratory  Recent Labs     11/30/23  1406 12/01/23  0150 12/01/23  0646 12/01/23  2239 12/02/23  0309   WBC 15.4* 22.9*  --   --  18.3*   RBC 2.51* 2.15*  --   --  2.39*   HEMOGLOBIN 8.1* 7.1* 6.4* 8.2* 7.7*   HEMATOCRIT 23.7* 19.9* 17.8* 23.0* 21.3*   MCV 94.4 92.6  --   --  89.1   MCH 32.3 33.0  --   --  32.2   MCHC 34.2 35.7*  --   --  36.2*   RDW 73.6* 76.8*  --   --  71.4*   PLATELETCT 298 201  --   --  236   MPV 9.5 9.6  --   --  9.4       Recent Labs     11/30/23  1406 12/01/23  0150 12/02/23  0309   SODIUM 138 136 136   POTASSIUM 4.6 4.5 3.5*   CHLORIDE 105 106 103   CO2 23 22 24   GLUCOSE 84 117* 92   BUN 9 5* 3*   CREATININE 0.51 0.35* 0.43*   CALCIUM 8.6 8.2* 7.9*                     Imaging  DX-CHEST-LIMITED (1 VIEW)   Final Result         1.  No acute cardiopulmonary disease.      DX-LUMBAR SPINE-2 OR 3 VIEWS   Final Result      1.  No evidence of lumbar fracture or significant degenerative disk disease.      2.  Convex right scoliotic curvature.      US-EXTREMITY VENOUS LOWER UNILAT RIGHT    (Results Pending)        Assessment/Plan  * Sickle cell crisis (HCC)- (present on admission)  Assessment & Plan  Patient with known sickle cell disease has developed sickle cell crisis with severe pain and has come to the emergency room for evaluation.  Hemoglobin at this point  "stable at 8 currently no transfusion indicated.  Patient will need pain management and thus I am initiating her on a morphine PCA.  Fluid resuscitation  Monitor H&H  Monitor Sagelyn event  Monitor electrolytes and correct as needed    12/2: I discussed case with hematology, for now we will continue with IV fluid resuscitation, blood transfusion as needed, pain management as well as initiating empiric antibiotic treatment with ceftriaxone and azithromycin.  Patient has had 2 episodes of fever, we are following cultures.  --Yesterday patient was complaining of worsening pain so we added Flexeril as well as increase the dose of her PCA pump of Dilaudid.  Today the patient was not really wanting to engage in a conversation with me.  She did mention she was having pain.    Hypomagnesemia  Assessment & Plan  Replace as needed  monitor    Full code status  Assessment & Plan  As per previous hospitalist:  \"As discussed with patient and mother full CODE STATUS at this point\"    Hypokalemia- (present on admission)  Assessment & Plan  Replace as needed  monitor    Normocytic anemia- (present on admission)  Assessment & Plan  Currently hemoglobin is 8.1 with hematocrit 23.7  No indication for transfusion.    12/1: Hb is 6.4 this morning. We have ordered transfusion. I have ordered 2 U as it seems it is difficult to obtain patient's blood type. Monitor closely for any signs of decompensation. We will repeat hemoglobin later this afternoon, we would try to get hemoglobin after patient's transfusion.    12/2: The patient received 2 units of blood yesterday.  Hemoglobin this morning is 7.7.  We will repeat hemoglobin at noon to see if she would warrant further blood transfusion.  If it is more than 7 we will repeat hemoglobin in the afternoon as well.    Leukocytosis- (present on admission)  Assessment & Plan  Leukocytosis is chronic and the patient does not appear to have an acute infection.  Continue to monitor white blood cell " count    12/1: Worsening leukocytosis, We have started patient on ceftriaxone and azythromycin.    12/2: White blood cell count is improving, this could be reactive, however the patient has had 2 episodes of fever while hospitalized.  We are following cultures, however we will continue with antibiotics ceftriaxone and azithromycin.  As per chart review it seems the patient has been refusing azithromycin.  I tried talking to the patient today and explained to her the importance of following medical recommendations, however she does not seem to want to listen.    PTSD (post-traumatic stress disorder)- (present on admission)  Assessment & Plan  History of PTSD currently not on any medications for it and the patient does not seem to have an acute exacerbation noted.    Mild persistent asthma without complication- (present on admission)  Assessment & Plan  RT protocol  Nebulizer treatments  Oxygen as needed    Cannabis abuse- (present on admission)  Assessment & Plan  Cannabis use as an outpatient she will not be allowed to use cannabis here.    Anxiety associated with depression- (present on admission)  Assessment & Plan  Chronic anxiety and without any anxiolytic use at home.      12/2: Currently on valium and atarax PRN.         VTE prophylaxis: SCDs    I have performed a physical exam and reviewed and updated ROS and Plan today (12/2/2023). In review of yesterday's note (12/1/2023), there are no changes except as documented above.      I spend at least 52 minutes providing care for this patient.  This included face-to-face interview, with nursing staff and mother present.  Review of lab work including CBC, hemoglobin, CMP, Mg.  Discussion with multidisciplinary team including case management, nursing staff and pharmacy.  Creating plan of care, reviewing orders.

## 2023-12-02 NOTE — PROGRESS NOTES
Telemetry Shift Summary     Rhythm SR/ST  HR Range   Ectopy   Measurements 0.16/0.10/0.36           Normal Values  Rhythm SR  HR Range    Measurements 0.12-0.20 / 0.06-0.10  / 0.30-0.52

## 2023-12-02 NOTE — PROGRESS NOTES
0700: entered room with dr. Vargas patient was very upset about the timing of  rounding. Md explained how the hospital works and how he gives orders to nurses who then do what is needed.   -- o2 walking is 77% bu she is addiment to walk around. Claims to not be dizzy, nursing explained that her low o2 is probably from her diseases process but dilaudid also lowers her ability to process low o2 levels.   0815: currently pacing the hallways. Vitals have not been assessed, minus a o2 spot check in the hallway.     1200; allowed for blood draws but again will not keep her O2 on, consistently dropping to  64% on room air.     1500: more cooperative allowed for vitals.     1800: doing much better, pain is more manageable , no longer having lower back pain. Leaving O2 on.

## 2023-12-02 NOTE — PROGRESS NOTES
Patient seen at bedside with mom present.  Nurse Pepito and nurse Teressa were also present.  The patient apparently was refusing blood transfusion because her white blood cell was elevated and she thought she had an infection.  I explained to her that with sickle cell crisis is anticipated the white blood cell count elevates without having an infection.  I addressed patient's concerns and grievances.  Patient at this point is agreeable to take blood.

## 2023-12-02 NOTE — PROGRESS NOTES
CNA notified by Monitor room that all leads were off. Pt confirmed she took them off herself.     RN and Monitor Room notified.

## 2023-12-02 NOTE — CARE PLAN
The patient is   Problem: Pain - Standard  Goal: Alleviation of pain or a reduction in pain to the patient’s comfort goal  Outcome: Progressing  Note: PCA pump in use. Monitor patients pain. Transition to alternative pain control method when tolerated.      Problem: Knowledge Deficit - Standard  Goal: Patient and family/care givers will demonstrate understanding of plan of care, disease process/condition, diagnostic tests and medications  Outcome: Progressing  Note: Patient updated on plan of care. Monitor O2, receive blood, abx as ordered.      Problem: Communication  Goal: The ability to communicate needs accurately and effectively will improve  Outcome: Progressing  Flowsheets (Taken 12/1/2023 1602)  Communication:   Assessed patient's ability to understand and communicate   Oriented patient to call light       Shift Goals  Clinical Goals: pain managment; monitor vital signs; BC: abx as ordered; blood transfusion  Patient Goals: pain managment    Progress made toward(s) clinical / shift goals:    Problem: Pain - Standard  Goal: Alleviation of pain or a reduction in pain to the patient’s comfort goal  Outcome: Progressing  Note: PCA pump in use. Monitor patients pain. Transition to alternative pain control method when tolerated.      Problem: Knowledge Deficit - Standard  Goal: Patient and family/care givers will demonstrate understanding of plan of care, disease process/condition, diagnostic tests and medications  Outcome: Progressing  Note: Patient updated on plan of care. Monitor O2, receive blood, abx as ordered.      Problem: Communication  Goal: The ability to communicate needs accurately and effectively will improve  Outcome: Progressing  Flowsheets (Taken 12/1/2023 2275)  Communication:   Assessed patient's ability to understand and communicate   Oriented patient to call light       Patient is not progressing towards the following goals:      Problem: Psychosocial  Goal: Patient's level of anxiety will  decrease  Outcome: Not Progressing  Flowsheets (Taken 12/1/2023 2543)  Decrease Anxiety Level:   Collaborated with patient to identify and develop coping strategies   Implemented stimuli reduction, calming techniques   Encouraged support system participation

## 2023-12-02 NOTE — PROGRESS NOTES
Telemetry Shift Summary    Rhythm SR, ST  HR Range   Ectopy -  Measurements 0.14/0.10/0.32        Normal Values  Rhythm SR  HR Range    Measurements 0.12-0.20 / 0.06-0.10  / 0.30-0.52

## 2023-12-02 NOTE — PROGRESS NOTES
1854 received report and assumed patient care. Patient's continuous pulse oximeter in place.     1926 patient assessment completed. Patient confirmed ongoing pain. Medication, mobility, oxygen saturation, diet, and the plan of care for the evening reviewed. Patient requested a lidocaine patch for her leg. Dr. Seymour notified. All patient's needs and questions addressed at this time.     1942 discussed oxygen saturation with patient and the importance of oxygen saturation maintaining greater than 90%. Patient discussed having sleep apnea and oxygen saturation saturation normally being in the 60%'s while at rest/sleeping. Patient nodded in agreement that it was okay to ask her to replace the nasal cannula when oxygen saturation became low. Oxygen was at 79% and patient was asked to replace oxygen. Oxygen saturation went up to 92% before leaving the room.     2014 patient asked questions regarding the continuous pulse oximeter alert and if the volume could be muted. Education was provided regarding needing to be alerted about a lower oxygen saturation and when the oxygen level is below 90% the machine will alarm. Patient was asked to replace oxygen so that saturation will be above 90%    2140 patient's oxygen saturation was 84%. Patient was asked to replace the nasal canula.     2235 patient's oxygen saturation at 77%. Patient was asked to place nasal cannula in her nose. Patient refused and said she would do it herself. Patient was educated on the importance of oxygen. Patient continued to refuse. Charge RN and MD notified.     2250 patient replaced nasal cannula. Oxygen saturation 99%.     2347 patient refused oxgyen and was saturationg at 74% and removed continuous IV fluids. Patient started raising her voice when asked to keep oxygen in place and IV connected. Dr. Cisneros aware.     0016 notified Dr. Cisneros that there is no icy hot in the after hours pharmacy for NAM to bring to the unit per after hours pharmacist.  Patient was requesting an alternative. Dr. Cisneros stated that ice pack and heat pack are an acceptable alternative until pharmacy is able to bring the medication in the morning. Dr. Cisneros was notified that patient was requesting albuterol to be prn without time restrictions. No new order placed.     0109 notified Dr. Cisneros that patient requested sports cream due to lack of icy hot and benadryl for itching. Dr. Cisneros ordered benadryl.    0130 patient requesting flexeril to be increased to 10 mg q8h instead of 5 mg. Dr. Cisneros notified. Order modified.     0222 patient's oxygen saturation was 77%. Patient was asked to replace oxygen.     0340 patient's oxygen saturation was 79%. Patient was asked to replace oxygen.     0533 patient requested benadryl. Dr. Cisneros notified.     0600 patient's oxygen saturation was 78% Patient was asked to replace oxygen.

## 2023-12-03 LAB
ALBUMIN SERPL BCP-MCNC: 3.2 G/DL (ref 3.2–4.9)
ALBUMIN/GLOB SERPL: 1.1 G/DL
ALP SERPL-CCNC: 100 U/L (ref 30–99)
ALT SERPL-CCNC: 24 U/L (ref 2–50)
ANION GAP SERPL CALC-SCNC: 6 MMOL/L (ref 7–16)
AST SERPL-CCNC: 41 U/L (ref 12–45)
BILIRUB SERPL-MCNC: 2.4 MG/DL (ref 0.1–1.5)
BUN SERPL-MCNC: 4 MG/DL (ref 8–22)
CALCIUM ALBUM COR SERPL-MCNC: 8.4 MG/DL (ref 8.5–10.5)
CALCIUM SERPL-MCNC: 7.8 MG/DL (ref 8.4–10.2)
CHLORIDE SERPL-SCNC: 106 MMOL/L (ref 96–112)
CO2 SERPL-SCNC: 24 MMOL/L (ref 20–33)
CREAT SERPL-MCNC: 0.4 MG/DL (ref 0.5–1.4)
ERYTHROCYTE [DISTWIDTH] IN BLOOD BY AUTOMATED COUNT: 63.1 FL (ref 35.9–50)
GFR SERPLBLD CREATININE-BSD FMLA CKD-EPI: 143 ML/MIN/1.73 M 2
GLOBULIN SER CALC-MCNC: 2.9 G/DL (ref 1.9–3.5)
GLUCOSE SERPL-MCNC: 75 MG/DL (ref 65–99)
HCT VFR BLD AUTO: 26.3 % (ref 37–47)
HCT VFR BLD AUTO: 26.4 % (ref 37–47)
HGB BLD-MCNC: 9.4 G/DL (ref 12–16)
HGB BLD-MCNC: 9.5 G/DL (ref 12–16)
MAGNESIUM SERPL-MCNC: 1.8 MG/DL (ref 1.5–2.5)
MCH RBC QN AUTO: 31 PG (ref 27–33)
MCHC RBC AUTO-ENTMCNC: 35.7 G/DL (ref 32.2–35.5)
MCV RBC AUTO: 86.8 FL (ref 81.4–97.8)
PLATELET # BLD AUTO: 179 K/UL (ref 164–446)
PMV BLD AUTO: 9 FL (ref 9–12.9)
POTASSIUM SERPL-SCNC: 4.2 MMOL/L (ref 3.6–5.5)
PROT SERPL-MCNC: 6.1 G/DL (ref 6–8.2)
RBC # BLD AUTO: 3.03 M/UL (ref 4.2–5.4)
SODIUM SERPL-SCNC: 136 MMOL/L (ref 135–145)
WBC # BLD AUTO: 12.7 K/UL (ref 4.8–10.8)

## 2023-12-03 PROCEDURE — 700105 HCHG RX REV CODE 258: Performed by: INTERNAL MEDICINE

## 2023-12-03 PROCEDURE — P9016 RBC LEUKOCYTES REDUCED: HCPCS

## 2023-12-03 PROCEDURE — 85027 COMPLETE CBC AUTOMATED: CPT

## 2023-12-03 PROCEDURE — 85014 HEMATOCRIT: CPT

## 2023-12-03 PROCEDURE — 36415 COLL VENOUS BLD VENIPUNCTURE: CPT

## 2023-12-03 PROCEDURE — 700111 HCHG RX REV CODE 636 W/ 250 OVERRIDE (IP): Mod: JZ | Performed by: INTERNAL MEDICINE

## 2023-12-03 PROCEDURE — A9270 NON-COVERED ITEM OR SERVICE: HCPCS | Performed by: INTERNAL MEDICINE

## 2023-12-03 PROCEDURE — A9270 NON-COVERED ITEM OR SERVICE: HCPCS | Performed by: HOSPITALIST

## 2023-12-03 PROCEDURE — 700111 HCHG RX REV CODE 636 W/ 250 OVERRIDE (IP): Performed by: HOSPITALIST

## 2023-12-03 PROCEDURE — 80053 COMPREHEN METABOLIC PANEL: CPT

## 2023-12-03 PROCEDURE — 99232 SBSQ HOSP IP/OBS MODERATE 35: CPT | Performed by: INTERNAL MEDICINE

## 2023-12-03 PROCEDURE — 36430 TRANSFUSION BLD/BLD COMPNT: CPT

## 2023-12-03 PROCEDURE — 700102 HCHG RX REV CODE 250 W/ 637 OVERRIDE(OP): Performed by: INTERNAL MEDICINE

## 2023-12-03 PROCEDURE — 3E02340 INTRODUCTION OF INFLUENZA VACCINE INTO MUSCLE, PERCUTANEOUS APPROACH: ICD-10-PCS | Performed by: INTERNAL MEDICINE

## 2023-12-03 PROCEDURE — 85018 HEMOGLOBIN: CPT

## 2023-12-03 PROCEDURE — 700102 HCHG RX REV CODE 250 W/ 637 OVERRIDE(OP): Performed by: HOSPITALIST

## 2023-12-03 PROCEDURE — 90471 IMMUNIZATION ADMIN: CPT

## 2023-12-03 PROCEDURE — 83735 ASSAY OF MAGNESIUM: CPT

## 2023-12-03 PROCEDURE — 770020 HCHG ROOM/CARE - TELE (206)

## 2023-12-03 PROCEDURE — 94760 N-INVAS EAR/PLS OXIMETRY 1: CPT

## 2023-12-03 PROCEDURE — 90686 IIV4 VACC NO PRSV 0.5 ML IM: CPT | Performed by: INTERNAL MEDICINE

## 2023-12-03 RX ORDER — HYDROMORPHONE HYDROCHLORIDE 1 MG/ML
0.5 INJECTION, SOLUTION INTRAMUSCULAR; INTRAVENOUS; SUBCUTANEOUS
Status: DISCONTINUED | OUTPATIENT
Start: 2023-12-03 | End: 2023-12-03

## 2023-12-03 RX ORDER — KETOROLAC TROMETHAMINE 30 MG/ML
15 INJECTION, SOLUTION INTRAMUSCULAR; INTRAVENOUS ONCE
Status: COMPLETED | OUTPATIENT
Start: 2023-12-03 | End: 2023-12-03

## 2023-12-03 RX ADMIN — KETOROLAC TROMETHAMINE 15 MG: 30 INJECTION, SOLUTION INTRAMUSCULAR at 01:47

## 2023-12-03 RX ADMIN — Medication: at 01:09

## 2023-12-03 RX ADMIN — Medication: at 00:12

## 2023-12-03 RX ADMIN — Medication 5000 UNITS: at 06:04

## 2023-12-03 RX ADMIN — ACETAMINOPHEN 1000 MG: 500 TABLET ORAL at 12:15

## 2023-12-03 RX ADMIN — IBUPROFEN 600 MG: 600 TABLET ORAL at 04:32

## 2023-12-03 RX ADMIN — ACETAMINOPHEN 1000 MG: 500 TABLET ORAL at 00:09

## 2023-12-03 RX ADMIN — PENICILLIN V POTASSIUM 250 MG: 500 TABLET, FILM COATED ORAL at 06:03

## 2023-12-03 RX ADMIN — DOCUSATE SODIUM 50MG AND SENNOSIDES 8.6MG 2 TABLET: 8.6; 5 TABLET, FILM COATED ORAL at 17:44

## 2023-12-03 RX ADMIN — HYDROXYUREA 1500 MG: 500 CAPSULE ORAL at 00:10

## 2023-12-03 RX ADMIN — PENICILLIN V POTASSIUM 250 MG: 500 TABLET, FILM COATED ORAL at 17:44

## 2023-12-03 RX ADMIN — CYCLOBENZAPRINE 10 MG: 10 TABLET, FILM COATED ORAL at 10:35

## 2023-12-03 RX ADMIN — HYDROXYZINE HYDROCHLORIDE 25 MG: 25 TABLET, FILM COATED ORAL at 12:15

## 2023-12-03 RX ADMIN — ACETAMINOPHEN 1000 MG: 500 TABLET ORAL at 17:44

## 2023-12-03 RX ADMIN — INFLUENZA A VIRUS A/VICTORIA/4897/2022 IVR-238 (H1N1) ANTIGEN (FORMALDEHYDE INACTIVATED), INFLUENZA A VIRUS A/DARWIN/9/2021 SAN-010 (H3N2) ANTIGEN (FORMALDEHYDE INACTIVATED), INFLUENZA B VIRUS B/PHUKET/3073/2013 ANTIGEN (FORMALDEHYDE INACTIVATED), AND INFLUENZA B VIRUS B/MICHIGAN/01/2021 ANTIGEN (FORMALDEHYDE INACTIVATED) 0.5 ML: 15; 15; 15; 15 INJECTION, SUSPENSION INTRAMUSCULAR at 14:23

## 2023-12-03 RX ADMIN — Medication: at 22:48

## 2023-12-03 RX ADMIN — Medication: at 05:56

## 2023-12-03 RX ADMIN — CYCLOBENZAPRINE 10 MG: 10 TABLET, FILM COATED ORAL at 00:10

## 2023-12-03 RX ADMIN — ACETAMINOPHEN 1000 MG: 500 TABLET ORAL at 06:04

## 2023-12-03 RX ADMIN — IBUPROFEN 600 MG: 600 TABLET ORAL at 10:35

## 2023-12-03 RX ADMIN — SODIUM CHLORIDE: 9 INJECTION, SOLUTION INTRAVENOUS at 05:59

## 2023-12-03 RX ADMIN — DIAZEPAM 2 MG: 2 TABLET ORAL at 04:20

## 2023-12-03 RX ADMIN — HYDROXYZINE HYDROCHLORIDE 25 MG: 25 TABLET, FILM COATED ORAL at 06:11

## 2023-12-03 RX ADMIN — HYDROXYUREA 1500 MG: 500 CAPSULE ORAL at 20:33

## 2023-12-03 RX ADMIN — Medication: at 11:57

## 2023-12-03 RX ADMIN — Medication: at 14:15

## 2023-12-03 RX ADMIN — FOLIC ACID 1 MG: 1 TABLET ORAL at 06:04

## 2023-12-03 RX ADMIN — AZITHROMYCIN DIHYDRATE 500 MG: 250 TABLET ORAL at 06:03

## 2023-12-03 RX ADMIN — HYDROMORPHONE HYDROCHLORIDE 0.5 MG: 1 INJECTION, SOLUTION INTRAMUSCULAR; INTRAVENOUS; SUBCUTANEOUS at 10:28

## 2023-12-03 RX ADMIN — DOCUSATE SODIUM 50MG AND SENNOSIDES 8.6MG 2 TABLET: 8.6; 5 TABLET, FILM COATED ORAL at 06:04

## 2023-12-03 RX ADMIN — CEFTRIAXONE SODIUM 1000 MG: 1 INJECTION, POWDER, FOR SOLUTION INTRAMUSCULAR; INTRAVENOUS at 06:10

## 2023-12-03 ASSESSMENT — ENCOUNTER SYMPTOMS
VOMITING: 0
DIZZINESS: 0
CONSTIPATION: 0
MYALGIAS: 1
ABDOMINAL PAIN: 0
CHILLS: 0
SPEECH CHANGE: 0
FEVER: 0
HEARTBURN: 0
NERVOUS/ANXIOUS: 0
SHORTNESS OF BREATH: 0
INSOMNIA: 0
SENSORY CHANGE: 0
PHOTOPHOBIA: 0
HEADACHES: 0
WEAKNESS: 0
BLURRED VISION: 0
CLAUDICATION: 0
COUGH: 0
DEPRESSION: 0
DIARRHEA: 0

## 2023-12-03 ASSESSMENT — PATIENT HEALTH QUESTIONNAIRE - PHQ9
2. FEELING DOWN, DEPRESSED, IRRITABLE, OR HOPELESS: NOT AT ALL
1. LITTLE INTEREST OR PLEASURE IN DOING THINGS: NOT AT ALL
SUM OF ALL RESPONSES TO PHQ9 QUESTIONS 1 AND 2: 0

## 2023-12-03 ASSESSMENT — PAIN DESCRIPTION - PAIN TYPE
TYPE: ACUTE PAIN
TYPE: ACUTE PAIN;CHRONIC PAIN
TYPE: ACUTE PAIN

## 2023-12-03 NOTE — PROGRESS NOTES
"Pt not wearing oxygen, O2 sat at 87%. Pt reports that she does not want to wear nasal cannula at this time as it dries out her face. Pt provided with Eucerin cream as well as medication to help with itching. Pt eventually agreeable to 3L oxygen nasal cannula. Pt PCA pump  and unable to charge, had to be replaced. Pt resting in bed quietly, rise and fall of chest noted. Pt then asked to be disconnected from pump and is ambulating in room with no obvious discomfort at this time as pt uses bathroom and washes face as well as make phone call. Pt was calm and cooperative while PCA pump had to be changed.     2100: Pt agitated and verbally aggressive. Pt has belittled staff and raises voice, states that staff \"Isn't doing anything.\" Pt continues to say that Dr. Cisneros has already placed all her orders and that the nurses \"Don't know how to treat sickle cell\" as well as disobeying doctor orders. This RN and charge RN attempted to educate pt on doctor orders and being able to adjust pain medications as well as provide blood at this time until clarification. De-escalation techniques attempted, alternatives to medication offered, pt unhappy and refusing alternatives. Pt requesting to speak with Dr. Cisneros. Pt continues to take oxygen off and refuses to place back on.    : Pt disconnected herself from IV fluids and PCA pumps, states \"I don't want to be toyed with\" while hanging new bag of IV fluid. Pt states that she is tired of playing \"catch up\" with her pain. Informed pt of importance of remaining on PCA pump so that we do not get more behind. Pt states \"I want everything done together, not in parts\" regarding receiving blood transfusion as well as PCA pump. Pt then requesting to speak to security. Security came up to speak with pt, pt still agitated.    : Dr. Cisneros at bedside speaking with pt. Orders placed by physician. Blood transfusion started at . Pt agreeable to be connected to IV fluids and PCA. Pt " "continues to refuse oxygen during start of blood transfusion.     0038: Pt complaining of \"11\"/10 pain, refusing ibuprofen, accepted hot pack. Pt is at delivery limit on PCA. Dr. Cisneros notified. Pt continues to refuse to wear oxygen as well as wearing continuous pulse ox. Pt is more cooperative with care at this time.    0240: Pt amendable to using nasal cannula as well as continuous pulse ox.    0620: Pt refusing to wear nasal cannula. O2 sat at 80%  "

## 2023-12-03 NOTE — PROGRESS NOTES
0038- Notified Dr Cisneros about patient's pain control.  Patient reporting 11/10 pain, PCA boluses currently locked out, and no remaining PRNs available for administration.  New order received.

## 2023-12-03 NOTE — PROGRESS NOTES
Telemetry Shift Summary     Rhythm: ST  HR Range:103-114  Ectopy: none  Measurements: 0.20/0.08/0.34    Normal Values  Measurements: 0.12- 0.20 / 0.08-0.10 / 0.30-0.52

## 2023-12-03 NOTE — PROGRESS NOTES
"0725: reapplied multiple times, patient donny not leave any form of o2 on, she constantly is itching her face, in motion that is consistent with opiate induced pruritus. I will discuss the plan of care with the MD. When educated on o2 and hypoxia relating to high dose pain regiment she was not interested in the discussion at this time. Patient is consistently hypoxic in the 80s I have seen her as low as 62% she mentioned this was 2/2 sleep apnea, however this was when she was awake. Nursing observed her sleeping but have not visually seen her exhibit apneic events associated with hypoxia.     0845: I set her up with breakfast, she IS . SPO2 is 82%, is said \"dont hate me but I'm going to put your oxygen on you\", unfortunately her response was \" not right now I'm trying to eat\".     1030: MD at bedside addressing patients complaints regarding her pain regiment and clinical safety.     1200: PCA was restarted at a lower rate, patient also has asked for a new MD for tomorrow.   "

## 2023-12-03 NOTE — CARE PLAN
The patient is Watcher - Medium risk of patient condition declining or worsening    Shift Goals  Clinical Goals: oxygen sat, pain mgt  Patient Goals: pain relief  Family Goals: maite    Progress made toward(s) clinical / shift goals:    Problem: Pain - Standard  Goal: Alleviation of pain or a reduction in pain to the patient’s comfort goal  Description: Target End Date:  Prior to discharge or change in level of care    Document on Vitals flowsheet    1.  Document pain using the appropriate pain scale per order or unit policy  2.  Educate and implement non-pharmacologic comfort measures (i.e. relaxation, distraction, massage, cold/heat therapy, etc.)  3.  Pain management medications as ordered  4.  Reassess pain after pain med administration per policy  5.  If opiods administered assess patient's response to pain medication is appropriate per POSS sedation scale  6.  Follow pain management plan developed in collaboration with patient and interdisciplinary team (including palliative care or pain specialists if applicable)  Outcome: Not Progressing  Note: Pt reporting an increase in pain, in contact with doctor to make adjustments       Patient is not progressing towards the following goals:      Problem: Pain - Standard  Goal: Alleviation of pain or a reduction in pain to the patient’s comfort goal  Description: Target End Date:  Prior to discharge or change in level of care    Document on Vitals flowsheet    1.  Document pain using the appropriate pain scale per order or unit policy  2.  Educate and implement non-pharmacologic comfort measures (i.e. relaxation, distraction, massage, cold/heat therapy, etc.)  3.  Pain management medications as ordered  4.  Reassess pain after pain med administration per policy  5.  If opiods administered assess patient's response to pain medication is appropriate per POSS sedation scale  6.  Follow pain management plan developed in collaboration with patient and interdisciplinary team  (including palliative care or pain specialists if applicable)  Outcome: Not Progressing  Note: Pt reporting an increase in pain, in contact with doctor to make adjustments

## 2023-12-03 NOTE — PROGRESS NOTES
"1946- Dr Vargas called this RN to discuss the patient's most recent hemoglobin of 7.1.  Dr Vargas requested this RN to ask Dr Rose if he would like a H&H redraw or order blood.      2001- Dr Rose notified of Sam's message, physician said \"No\"    2015- Dr Vargas called this RN back, physician notified of Dr Rose's response.  Orders placed by Dr Vargas.  Blood transfusion orders for less than or equal to 7.  "

## 2023-12-03 NOTE — PROGRESS NOTES
Patient becoming verbally aggressive to staff despite attempts at therapeutic communication. Pt requesting security at bedside. Security contacted as requested. Security came to bedside and spoke with the patient for appox 5-10 mins before she demanded they leave. Pt removed PCA herself, and was disagreeable to be reconnected until transfusion was initiated. Dr. Cisneros contacted for clarification on transfusion order. Per MD, proceed with transfusion. Lab contacted and unit of blood was retrieved from for transfusion. Dr. Cisneros came to bedside per pt request to answer questions and address needs.

## 2023-12-04 ENCOUNTER — APPOINTMENT (OUTPATIENT)
Dept: RADIOLOGY | Facility: MEDICAL CENTER | Age: 23
DRG: 812 | End: 2023-12-04
Attending: HOSPITALIST
Payer: MEDICAID

## 2023-12-04 VITALS
TEMPERATURE: 98 F | HEIGHT: 62 IN | DIASTOLIC BLOOD PRESSURE: 75 MMHG | RESPIRATION RATE: 18 BRPM | HEART RATE: 96 BPM | OXYGEN SATURATION: 94 % | SYSTOLIC BLOOD PRESSURE: 156 MMHG | WEIGHT: 128.53 LBS | BODY MASS INDEX: 23.65 KG/M2

## 2023-12-04 LAB
ALBUMIN SERPL BCP-MCNC: 3.2 G/DL (ref 3.2–4.9)
ALBUMIN/GLOB SERPL: 1.1 G/DL
ALP SERPL-CCNC: 116 U/L (ref 30–99)
ALT SERPL-CCNC: 26 U/L (ref 2–50)
ANION GAP SERPL CALC-SCNC: 9 MMOL/L (ref 7–16)
AST SERPL-CCNC: 38 U/L (ref 12–45)
BASOPHILS # BLD AUTO: 0.3 % (ref 0–1.8)
BASOPHILS # BLD: 0.03 K/UL (ref 0–0.12)
BILIRUB SERPL-MCNC: 2.6 MG/DL (ref 0.1–1.5)
BUN SERPL-MCNC: 8 MG/DL (ref 8–22)
CALCIUM ALBUM COR SERPL-MCNC: 9 MG/DL (ref 8.5–10.5)
CALCIUM SERPL-MCNC: 8.4 MG/DL (ref 8.4–10.2)
CHLORIDE SERPL-SCNC: 105 MMOL/L (ref 96–112)
CO2 SERPL-SCNC: 23 MMOL/L (ref 20–33)
CREAT SERPL-MCNC: 0.53 MG/DL (ref 0.5–1.4)
EOSINOPHIL # BLD AUTO: 0.56 K/UL (ref 0–0.51)
EOSINOPHIL NFR BLD: 4.9 % (ref 0–6.9)
ERYTHROCYTE [DISTWIDTH] IN BLOOD BY AUTOMATED COUNT: 60.6 FL (ref 35.9–50)
FLUAV RNA SPEC QL NAA+PROBE: NEGATIVE
FLUBV RNA SPEC QL NAA+PROBE: NEGATIVE
GFR SERPLBLD CREATININE-BSD FMLA CKD-EPI: 134 ML/MIN/1.73 M 2
GLOBULIN SER CALC-MCNC: 2.9 G/DL (ref 1.9–3.5)
GLUCOSE SERPL-MCNC: 83 MG/DL (ref 65–99)
HCT VFR BLD AUTO: 25.4 % (ref 37–47)
HGB BLD-MCNC: 9.3 G/DL (ref 12–16)
IMM GRANULOCYTES # BLD AUTO: 0.05 K/UL (ref 0–0.11)
IMM GRANULOCYTES NFR BLD AUTO: 0.4 % (ref 0–0.9)
LYMPHOCYTES # BLD AUTO: 2.65 K/UL (ref 1–4.8)
LYMPHOCYTES NFR BLD: 23.4 % (ref 22–41)
MCH RBC QN AUTO: 31.4 PG (ref 27–33)
MCHC RBC AUTO-ENTMCNC: 36.6 G/DL (ref 32.2–35.5)
MCV RBC AUTO: 85.8 FL (ref 81.4–97.8)
MONOCYTES # BLD AUTO: 1.08 K/UL (ref 0–0.85)
MONOCYTES NFR BLD AUTO: 9.5 % (ref 0–13.4)
NEUTROPHILS # BLD AUTO: 6.96 K/UL (ref 1.82–7.42)
NEUTROPHILS NFR BLD: 61.5 % (ref 44–72)
NRBC # BLD AUTO: 1.29 K/UL
NRBC BLD-RTO: 11.4 /100 WBC (ref 0–0.2)
NT-PROBNP SERPL IA-MCNC: 952 PG/ML (ref 0–125)
PLATELET # BLD AUTO: 164 K/UL (ref 164–446)
PMV BLD AUTO: 8.7 FL (ref 9–12.9)
POTASSIUM SERPL-SCNC: 4 MMOL/L (ref 3.6–5.5)
PROCALCITONIN SERPL-MCNC: 0.69 NG/ML
PROT SERPL-MCNC: 6.1 G/DL (ref 6–8.2)
RBC # BLD AUTO: 2.96 M/UL (ref 4.2–5.4)
RSV RNA SPEC QL NAA+PROBE: NEGATIVE
SARS-COV-2 RNA RESP QL NAA+PROBE: NOTDETECTED
SODIUM SERPL-SCNC: 137 MMOL/L (ref 135–145)
SPECIMEN SOURCE: NORMAL
WBC # BLD AUTO: 11.3 K/UL (ref 4.8–10.8)

## 2023-12-04 PROCEDURE — 80053 COMPREHEN METABOLIC PANEL: CPT

## 2023-12-04 PROCEDURE — A9270 NON-COVERED ITEM OR SERVICE: HCPCS | Performed by: HOSPITALIST

## 2023-12-04 PROCEDURE — 84145 PROCALCITONIN (PCT): CPT

## 2023-12-04 PROCEDURE — 99233 SBSQ HOSP IP/OBS HIGH 50: CPT | Performed by: HOSPITALIST

## 2023-12-04 PROCEDURE — 85025 COMPLETE CBC W/AUTO DIFF WBC: CPT

## 2023-12-04 PROCEDURE — 36415 COLL VENOUS BLD VENIPUNCTURE: CPT

## 2023-12-04 PROCEDURE — 700111 HCHG RX REV CODE 636 W/ 250 OVERRIDE (IP): Performed by: HOSPITALIST

## 2023-12-04 PROCEDURE — 83880 ASSAY OF NATRIURETIC PEPTIDE: CPT

## 2023-12-04 PROCEDURE — 99239 HOSP IP/OBS DSCHRG MGMT >30: CPT | Performed by: INTERNAL MEDICINE

## 2023-12-04 PROCEDURE — 71045 X-RAY EXAM CHEST 1 VIEW: CPT

## 2023-12-04 PROCEDURE — 0241U HCHG SARS-COV-2 COVID-19 NFCT DS RESP RNA 4 TRGT MIC: CPT

## 2023-12-04 PROCEDURE — 700102 HCHG RX REV CODE 250 W/ 637 OVERRIDE(OP): Performed by: HOSPITALIST

## 2023-12-04 PROCEDURE — 700101 HCHG RX REV CODE 250: Performed by: HOSPITALIST

## 2023-12-04 RX ORDER — KETOROLAC TROMETHAMINE 30 MG/ML
15 INJECTION, SOLUTION INTRAMUSCULAR; INTRAVENOUS ONCE
Status: COMPLETED | OUTPATIENT
Start: 2023-12-04 | End: 2023-12-04

## 2023-12-04 RX ORDER — LIDOCAINE 50 MG/G
1 PATCH TOPICAL EVERY 24 HOURS
Status: DISCONTINUED | OUTPATIENT
Start: 2023-12-04 | End: 2023-12-04 | Stop reason: HOSPADM

## 2023-12-04 RX ORDER — FUROSEMIDE 40 MG/1
40 TABLET ORAL DAILY
Qty: 1 TABLET | Refills: 0 | Status: SHIPPED | OUTPATIENT
Start: 2023-12-04 | End: 2023-12-05

## 2023-12-04 RX ORDER — HYDROCODONE BITARTRATE AND ACETAMINOPHEN 5; 325 MG/1; MG/1
1 TABLET ORAL EVERY 8 HOURS PRN
Qty: 9 TABLET | Refills: 0 | Status: SHIPPED | OUTPATIENT
Start: 2023-12-04 | End: 2023-12-07

## 2023-12-04 RX ADMIN — HYDROXYZINE HYDROCHLORIDE 25 MG: 25 TABLET, FILM COATED ORAL at 03:06

## 2023-12-04 RX ADMIN — LIDOCAINE 1 PATCH: 50 PATCH TOPICAL at 00:16

## 2023-12-04 RX ADMIN — CYCLOBENZAPRINE 10 MG: 10 TABLET, FILM COATED ORAL at 04:15

## 2023-12-04 RX ADMIN — ACETAMINOPHEN 1000 MG: 500 TABLET ORAL at 00:14

## 2023-12-04 RX ADMIN — KETOROLAC TROMETHAMINE 15 MG: 30 INJECTION, SOLUTION INTRAMUSCULAR at 03:06

## 2023-12-04 NOTE — DISCHARGE SUMMARY
"Discharge Summary    CHIEF COMPLAINT ON ADMISSION  Chief Complaint   Patient presents with    Low Back Pain     Pt has hx of sickle cell. Pt states she did have a fall about a month ago and fell onto her buttock. Pt also states she fell approx 2 days ago again onto her buttocks. Pt does endorses some pain down both legs and some tingling in her feet. No neuro deficits noted. Pt recd 2.5mg morphine and 250 ml IVF PTA. Pt states pain still 9/10 lower back and in joints. Pt placed on 2L NC for comfort.        Reason for Admission  EMS     Admission Date  11/30/2023    CODE STATUS  Full Code    HPI & HOSPITAL COURSE  This is a 23 y.o. female here with cell disease and at this point feels like she is going into sickle cell crisis.  Hemoglobin stable at 8.1.  White blood cell count is elevated.  Patient's pain is out of proportion to the acute findings.  Patient's pain needs to be managed with a PCA pump.  Patient will be given fluid resuscitation.  Will monitor hemoglobin to ensure that the patient does not drop her counts and if she does drop we will need to transfuse her.  She has fired one physician already and states \"we don't know anything about her or sickle cell disease.\"  After one conversation patient seemed reasonable with plan of care with reducing her pain medication off the PCA to PRN IV dilaudid and then after she received this medication she stated I stole her PCA away from her and never had the conversation that we did.  She would like another physician tomorrow.      Patient was maintained on a Dilaudid PCA pump during her hospital stay.  This helped with her sickle cell crisis pain syndrome.  During her hospitalization, she was transfused 3 units PRBC, hemoglobin was down to 6.4 and was 9.3 on discharge.    On this hospitalization, Ms. Anand had removed multiple hospital physicians in charge of her care.  Multiple attempts at resolving conflict and counseling patient on treatment plan was unfortunately " "futile prior to me taking over her care.  Throughout her hospital stay, she was showing signs of hypoxia, 86% at midnight overnight.  81% on 12/3 at 7:30 in the morning.  Ms. Anand stated that she has NESHA and will always have hypoxia.  She therefore declined oxygen supplementation from prior hospitalists.  It is unclear if she understands that hypoxia worsens her sickle cell condition and potentially can lead to exacerbations.    I have only had the privilege of meeting Ms. Anand for the last few hours, she wished to be discharged today.  I spoke with Ms. Anand who felt her swelling and chest x-ray was showing more swelling which I explained to her was potential pulmonary edema from IV fluids that she had required for her sickle cell crisis.  I explained to her that I would prescribe him 1 dose of oral Lasix which will help her urinate most of the excess fluid.  I explained to her there are risks including dehydration which can make her sickle cell condition exacerbate.  She accepted the risks and benefits and I have prescribed 1 dose of Lasix 40 mill equivalents to her outpatient pharmacy.  She also requested for pain medication which she states she does well with the \"Tylenol and hydrocodone.\"  I reviewed her opioid prescriptions in the past, she has received Norco 5 mg and 10 mg, tolerated both medications.  I have prescribed Norco 5 mg for 3 days every 8 hours as needed for severe pain from sickle cell.    On discharge, Ms. Anand stated that she has 3 specialist 1 at [facility] and in [place].  She explained that she can follow-up with her specialist for her sickle cell condition.  I recommended that she immediately follow-up with her primary care provider Dr. Castro within 1 to 2 weeks in order to review and represcribe any pain medications that she will need.  I explained to Ms. Anand that I will not be changing any of her home medications at this time only included Lasix and " Norco.    Therefore, she is discharged in good and stable condition to home with close outpatient follow-up.    The patient met 2-midnight criteria for an inpatient stay at the time of discharge.    Discharge Date  12/4/2023    FOLLOW UP ITEMS POST DISCHARGE  With sickle cell specialist and primary care provider    DISCHARGE DIAGNOSES  Principal Problem:    Sickle cell crisis (HCC) (POA: Yes)  Active Problems:    Anxiety associated with depression (POA: Yes)    Cannabis abuse (POA: Yes)    Mild persistent asthma without complication (POA: Yes)    PTSD (post-traumatic stress disorder) (POA: Yes)    Leukocytosis (POA: Yes)    Normocytic anemia (POA: Yes)    Hypokalemia (POA: Yes)    Full code status (POA: Unknown)    Hypomagnesemia (POA: Unknown)  Resolved Problems:    * No resolved hospital problems. *      FOLLOW UP  No future appointments.  [facility]  [address] [facility] [address]  [phone]  Schedule an appointment as soon as possible for a visit in 1 week(s)  Please follow up with your sickle cell specialist as soon as possible given your ongoing symptoms.    Cullen Castro M.D.  [address] [facility] [address]  [phone]    Schedule an appointment as soon as possible for a visit in 1 week(s)  Please call for an appointment within 1-2 weeks to discuss your pain from your sickle cell crisis.  Last labs: WBC 11.3 from 22.9, Hgb 9.3, Plt 164. sodium 137, potassium 4.0, BUN 8, Cr 0.53. , Total bilirubin 2.6.      MEDICATIONS ON DISCHARGE     Medication List        ASK your doctor about these medications        Instructions   albuterol 108 (90 Base) MCG/ACT Aers inhalation aerosol   Doctor's comments: Give albuterol that is patient or insurance preference please  Inhale 2 Puffs every 6 hours as needed for Shortness of Breath.  Dose: 2 Puff     folic acid 1 MG Tabs  Commonly known as: Folvite   Take 1 Tab by mouth every day.  Dose: 1 mg     hydroxyurea 500 MG Caps  Commonly  known as: Hydrea   Take 1,500 mg by mouth every day.  Dose: 1,500 mg     ibuprofen 600 MG Tabs  Commonly known as: Motrin   Take 1 Tablet by mouth every 6 hours as needed for Mild Pain or Moderate Pain.  Dose: 600 mg     penicillin v potassium 250 MG Tabs  Commonly known as: Veetid   Take 1 Tab by mouth 2 times a day.  Dose: 250 mg     vitamin D3 125 MCG (5000 UT) Caps   Take 5,000 Units by mouth every day.  Dose: 5,000 Units              Allergies  Allergies   Allergen Reactions    Haloperidol Unspecified     Received Haldol x 2 doses in [facility] and developed an acute dystonic reaction, treated with diphenhydramine       DIET  Orders Placed This Encounter   Procedures    Diet Order Diet: Regular     Standing Status:   Standing     Number of Occurrences:   1     Order Specific Question:   Diet:     Answer:   Regular [1]       ACTIVITY  As tolerated.  Weight bearing as tolerated    CONSULTATIONS  None    PROCEDURES  Blood transfusions, 3 units    LABORATORY  Lab Results   Component Value Date    SODIUM 137 12/04/2023    POTASSIUM 4.0 12/04/2023    CHLORIDE 105 12/04/2023    CO2 23 12/04/2023    GLUCOSE 83 12/04/2023    BUN 8 12/04/2023    CREATININE 0.53 12/04/2023        Lab Results   Component Value Date    WBC 11.3 (H) 12/04/2023    HEMOGLOBIN 9.3 (L) 12/04/2023    HEMATOCRIT 25.4 (L) 12/04/2023    PLATELETCT 164 12/04/2023      DX-CHEST-PORTABLE (1 VIEW)   Final Result         1.  Mild pulmonary edema and/or infiltrate   2.  Cardiomegaly      US-EXTREMITY VENOUS LOWER UNILAT RIGHT   Final Result      No evidence of right lower extremity deep venous thrombosis.                  DX-CHEST-LIMITED (1 VIEW)   Final Result         1.  No acute cardiopulmonary disease.      DX-LUMBAR SPINE-2 OR 3 VIEWS   Final Result      1.  No evidence of lumbar fracture or significant degenerative disk disease.      2.  Convex right scoliotic curvature.          Total time of the discharge process exceeds 32 minutes.  More than  50% of time was spent face to face with patient.  This included but not limited to review of hospital course with patient, treatment goals upon discharge, recommendations to PCP, continued and new medications and their adverse reactions, and nursing instructions for patient.

## 2023-12-04 NOTE — PROGRESS NOTES
OVERNIGHT HOSPITALIST:    Paged by nursing Staff multiple times this evening and spoke with patient by the bedside as she told the staff she was going to leave AMA.    Issues addressed:  -Pain:   Dilaudid: Patient requesting more frequent bolus on her PCA pump.  We discussed that total maximum amount of 4 hour dose, currently at 5 mg will not be changed.  Basal rate of PCA pump was decreased from 1 mg/h to 0.4 mg/h with same 0.5 bolus spaced out to every 30 minutes so she would not run out of medications.  This was explained to the patient and her mother, or by the bedside in detail with agreement.  After a few hours on above settings, she requested to reverse back to her regional pump settings however not letting nursing staff at this time to do so therefore we will continue to modify infusion rate as described.  Toradol: 15 mg dose was given at 2:45 AM  Lidocaine patch: While patient was changing close, in preparation to leave, her lidocaine patch initially applied at midnight fell off and the new one was replaced.    Patient had initially refused her scheduled hydroxyzine and took this medication later.    Labs: I ordered early laboratory work to allow her to rest without interruption for a longer time.  She was concerned about worsening bilateral lower extremity edema and BNP was added.  This morning her hemoglobin remains a stable at 9.3.  Given her ongoing shortness of breath and hypoxia, I am repeating her chest x-ray that showed mild pulmonary edema versus infiltrate - will need lasix this am.     Other: Patient requested hospitalist to do a medical attestation for [employer] for reduced energy bill.  I reviewed paperwork, she has humidifier but none of the medical equipment listed on the form therefore I am unable to sign this for her at this time and explained her why.    Additionally, her O2 sats remain low she has been persistently refusing oxygen in spite of multiple people providing education on  this.    I provided full signout about this patient to the hospitalist.  Total amount of time allocated on direct patient care, case review and conversations with the patient was 32 minutes

## 2023-12-04 NOTE — PROGRESS NOTES
"Charge nurse notified earlier tonight about Pt refusing to have the bed alarm on, and the yellow \"fall risk\" wrist band on.    Pt's hospital ID wrist band on the table instead on her. She stated it was too tight and she removed it. I offered to get a new one and put it a little more lose, Pt refused. Charge nurse at bedside at the time and educated Pt.     "

## 2023-12-04 NOTE — PROGRESS NOTES
"Patient agitated with staff and pressed the staff assist button, Both Charge RN's and beside RN to room. Patient requesting discharge and medication. Patient again educated on the process of discharge and that of leaving AMA.  Patient also educated that staff assist button is for emergency use only. Patient asked not to use button unless for emergency use. Patient replied with \"I'll push it again\" patient provided education again regarding the use of staff assist button and that if the behavior continued then security would be call to assist with education. Patient again push the staff assist twice and security was called  "

## 2023-12-04 NOTE — PROGRESS NOTES
"At 0423 patient requested that PCA pump setting be changed back to prior order, MD Cisneros notified regarding request. MD changed PCA order back to original setting, pending pharmacies approval to change PCA settings.     Upon entering the patients room at 0520 to complete med pass with primary RN and to change PCA pump setting, patient instructed staff not to touch PCA but and that she did not want to take medications from primary RN.    Patient educated that all staff was currently in med pass and that it could be sometime before another nurse would be available to give medication. Patient verbalized understanding stating \"I can wait until after shift change.\"    MD Cisneros notified that settings to PCA pump have not been changed  "

## 2023-12-04 NOTE — PROGRESS NOTES
Problem: Adult Inpatient Plan of Care  Goal: Plan of Care Review  Outcome: Ongoing, Progressing     Problem: Adult Inpatient Plan of Care  Goal: Plan of Care Review  Outcome: Ongoing, Progressing     Problem: Adult Inpatient Plan of Care  Goal: Patient-Specific Goal (Individualized)  Outcome: Ongoing, Progressing     Problem: Adult Inpatient Plan of Care  Goal: Absence of Hospital-Acquired Illness or Injury  Outcome: Ongoing, Progressing      "Hospital Medicine Daily Progress Note    Date of Service  12/3/2023    Chief Complaint  Henry Anand is a 23 y.o. female admitted 11/30/2023 with pain crisis secondary to sickle cell.    Hospital Course  cell disease and at this point feels like she is going into sickle cell crisis.  Hemoglobin stable at 8.1.  White blood cell count is elevated.  Patient's pain is out of proportion to the acute findings.  Patient's pain needs to be managed with a PCA pump.  Patient will be given fluid resuscitation.  Will monitor hemoglobin to ensure that the patient does not drop her counts and if she does drop we will need to transfuse her.  She has fired one physician already and states \"we don't know anything about her or sickle cell disease.\"  After one conversation patient seemed reasonable with plan of care with reducing her pain medication off the PCA to PRN IV dilaudid and then after she received this medication she stated I stole her PCA away from her and never had the conversation that we did.  She would like another physician tomorrow.      Interval Problem Update  12/3 Patient seen after she felt she did not receive the care she wanted from Dr Vargas.  I saw her and her oxygen was in the upper 70's and mid 80's but she would continue to take off her oxygen.  When replaced she stated it was causing her breakout around her mouth and would not wear it.  She has received multiple transfusions and her hgb is up to 9.4 this am and recheck this pm is 9.5.  Initially she was asking for discharge home with RX for norco but now states she is having a stroke because her leg hurts - which it has been and is demanding MRI which is not appropriate.  Patient is wanting another physician to see her tomorrow.    I have discussed this patient's plan of care and discharge plan at IDT rounds today with Case Management, Nursing, Nursing leadership, and other members of the IDT team.    Consultants/Specialty  none    Code " Status  Full Code    Disposition  The patient is not medically cleared for discharge to home or a post-acute facility.  Anticipate discharge to: home with close outpatient follow-up    I have placed the appropriate orders for post-discharge needs.    Review of Systems  Review of Systems   Constitutional:  Negative for chills and fever.   HENT:  Negative for congestion.    Eyes:  Negative for blurred vision and photophobia.   Respiratory:  Negative for cough and shortness of breath.    Cardiovascular:  Negative for chest pain, claudication and leg swelling.   Gastrointestinal:  Negative for abdominal pain, constipation, diarrhea, heartburn and vomiting.   Genitourinary:  Negative for dysuria and hematuria.   Musculoskeletal:  Positive for myalgias (right calf pain). Negative for joint pain.   Skin:  Negative for itching and rash.   Neurological:  Negative for dizziness, sensory change, speech change, weakness and headaches.   Psychiatric/Behavioral:  Negative for depression. The patient is not nervous/anxious and does not have insomnia.         Physical Exam  Temp:  [36.4 °C (97.5 °F)-37.4 °C (99.4 °F)] 36.8 °C (98.3 °F)  Pulse:  [] 77  Resp:  [14-22] 14  BP: (100-141)/(49-75) 121/71  SpO2:  [81 %-98 %] 96 %    Physical Exam  Vitals and nursing note reviewed.   Constitutional:       General: She is not in acute distress.     Appearance: Normal appearance. She is not ill-appearing.   HENT:      Head: Normocephalic and atraumatic.      Nose: Nose normal.   Cardiovascular:      Rate and Rhythm: Normal rate and regular rhythm.      Heart sounds: Normal heart sounds. No murmur heard.  Pulmonary:      Effort: Pulmonary effort is normal.      Breath sounds: Normal breath sounds.   Abdominal:      General: Bowel sounds are normal. There is no distension.      Palpations: Abdomen is soft.   Musculoskeletal:         General: No swelling or tenderness.      Cervical back: Neck supple.      Comments: Right calf pain,  negative homans.   Skin:     General: Skin is warm and dry.   Neurological:      General: No focal deficit present.      Mental Status: She is alert and oriented to person, place, and time.   Psychiatric:         Mood and Affect: Mood normal.         Fluids    Intake/Output Summary (Last 24 hours) at 12/3/2023 1832  Last data filed at 12/3/2023 1333  Gross per 24 hour   Intake 926 ml   Output --   Net 926 ml       Laboratory  Recent Labs     12/01/23  0150 12/01/23  0646 12/02/23  0309 12/02/23  1216 12/02/23  1900 12/03/23  0540 12/03/23  1721   WBC 22.9*  --  18.3*  --   --  12.7*  --    RBC 2.15*  --  2.39*  --   --  3.03*  --    HEMOGLOBIN 7.1*   < > 7.7*   < > 7.1* 9.4* 9.5*   HEMATOCRIT 19.9*   < > 21.3*   < > 19.4* 26.3* 26.4*   MCV 92.6  --  89.1  --   --  86.8  --    MCH 33.0  --  32.2  --   --  31.0  --    MCHC 35.7*  --  36.2*  --   --  35.7*  --    RDW 76.8*  --  71.4*  --   --  63.1*  --    PLATELETCT 201  --  236  --   --  179  --    MPV 9.6  --  9.4  --   --  9.0  --     < > = values in this interval not displayed.     Recent Labs     12/01/23  0150 12/02/23  0309 12/03/23  0540   SODIUM 136 136 136   POTASSIUM 4.5 3.5* 4.2   CHLORIDE 106 103 106   CO2 22 24 24   GLUCOSE 117* 92 75   BUN 5* 3* 4*   CREATININE 0.35* 0.43* 0.40*   CALCIUM 8.2* 7.9* 7.8*                   Imaging  US-EXTREMITY VENOUS LOWER UNILAT RIGHT   Final Result      No evidence of right lower extremity deep venous thrombosis.                  DX-CHEST-LIMITED (1 VIEW)   Final Result         1.  No acute cardiopulmonary disease.      DX-LUMBAR SPINE-2 OR 3 VIEWS   Final Result      1.  No evidence of lumbar fracture or significant degenerative disk disease.      2.  Convex right scoliotic curvature.           Assessment/Plan  * Sickle cell crisis (HCC)- (present on admission)  Assessment & Plan  Patient with known sickle cell disease has developed sickle cell crisis with severe pain and has come to the emergency room for evaluation.  " Hemoglobin at this point stable at 8 currently no transfusion indicated.  Patient will need pain management and thus I am initiating her on a morphine PCA.  Fluid resuscitation  Monitor H&H  Monitor Sagelyn event  Monitor electrolytes and correct as needed    12/2: I discussed case with hematology, for now we will continue with IV fluid resuscitation, blood transfusion as needed, pain management as well as initiating empiric antibiotic treatment with ceftriaxone and azithromycin.  Patient has had 2 episodes of fever, we are following cultures.  --Yesterday patient was complaining of worsening pain so we added Flexeril as well as increase the dose of her PCA pump of Dilaudid.  Today the patient was not really wanting to engage in a conversation with me.  She did mention she was having pain.  12/3 PCA pump decreased to 1.0 basal and bolus kept same, patient having increased confusion and repeatedly refusing to keep oxygen in place, was receiving too much pain medication.    Hypomagnesemia  Assessment & Plan  Replace as needed  monitor    Full code status  Assessment & Plan  As per previous hospitalist:  \"As discussed with patient and mother full CODE STATUS at this point\"    Hypokalemia- (present on admission)  Assessment & Plan  Replace as needed  monitor    Normocytic anemia- (present on admission)  Assessment & Plan  Currently hemoglobin is 8.1 with hematocrit 23.7  No indication for transfusion.    12/1: Hb is 6.4 this morning. We have ordered transfusion. I have ordered 2 U as it seems it is difficult to obtain patient's blood type. Monitor closely for any signs of decompensation. We will repeat hemoglobin later this afternoon, we would try to get hemoglobin after patient's transfusion.    12/2: The patient received 2 units of blood yesterday.  Hemoglobin this morning is 7.7.  We will repeat hemoglobin at noon to see if she would warrant further blood transfusion.  If it is more than 7 we will repeat hemoglobin " in the afternoon as well.    12/3 Patient received 2 more unites overnight, hgb 9.4 and repeat 9.5, patient stable for discharge with pain medication    Leukocytosis- (present on admission)  Assessment & Plan  Leukocytosis is chronic and the patient does not appear to have an acute infection.  Continue to monitor white blood cell count  Dc antibiotics      PTSD (post-traumatic stress disorder)- (present on admission)  Assessment & Plan  History of PTSD currently not on any medications for it and the patient does not seem to have an acute exacerbation noted.    Mild persistent asthma without complication- (present on admission)  Assessment & Plan  RT protocol  Nebulizer treatments  Oxygen as needed - patient frequently refuses oxygen despite frequent education regarding hypoxia      Cannabis abuse- (present on admission)  Assessment & Plan  Cannabis use as an outpatient she will not be allowed to use cannabis here.    Anxiety associated with depression- (present on admission)  Assessment & Plan  Chronic anxiety and without any anxiolytic use at home.      12/2: Currently on valium and atarax PRN.         VTE prophylaxis:   SCDs/TEDs      I have performed a physical exam and reviewed and updated ROS and Plan today (12/3/2023). In review of yesterday's note (12/2/2023), there are no changes except as documented above.

## 2023-12-04 NOTE — PROGRESS NOTES
Bedside report received from Shan SOTO and assumed Pt's cares. Call light within reach. Safety measures in place.    Pt is a moderate risk for falls but refused to have the bed alarm on.     O2 sat down to 86% at room air. She refused O2, she stated she cannot have the nasal cannula on because the rash she has on her nose, offered a face mask, Pt refused.     Pt offered other medications available for her, like Ibuprofen, Flexeril, and Atarax. Pt refused all of them    00:00 Pt has not been compliant with treatment and very verbally agressive with hospital staff. She's refusing bed alarm. She keeps getting out of bed many times with SCDs plugged to the bed, encouraged her to remove them before getting up but she yells and says she won't. She has been pulling off the telemetry leads and don't let this RN to place them back on, she says she knows how to do it, she won't listen about how they need to be in a specific spot.     01:30 Dr Cisneros at bedside talking to Pt. Aware od Pts Oxygen levels at RA. Order received to Dc Telemetry.     04:21 Pt requested her muscle relaxant, Flexeril given as per MAR. Pt left the pill on the bedside table and she said she will not take it now. Pt got more verbally aggressive and asked me to leave the room.     05:20 Pt refused to take medications from this RN, Charge RN present. Pt requests her medications to be given by the day shift nurse. Also, Pt refused to have the PCA pump settings to be changed to follow the new order. Dr Cisneros notified by Charge RN.    07:30 Bedside report given to next shift Genevieve SOTO. Care released.

## 2023-12-04 NOTE — HOSPITAL COURSE
"cell disease and at this point feels like she is going into sickle cell crisis.  Hemoglobin stable at 8.1.  White blood cell count is elevated.  Patient's pain is out of proportion to the acute findings.  Patient's pain needs to be managed with a PCA pump.  Patient will be given fluid resuscitation.  Will monitor hemoglobin to ensure that the patient does not drop her counts and if she does drop we will need to transfuse her.  She has fired one physician already and states \"we don't know anything about her or sickle cell disease.\"  After one conversation patient seemed reasonable with plan of care with reducing her pain medication off the PCA to PRN IV dilaudid and then after she received this medication she stated I stole her PCA away from her and never had the conversation that we did.  She would like another physician tomorrow.    "

## 2023-12-04 NOTE — PROGRESS NOTES
Bedside report received from Shan SOTO and assumed Pt's cares. Call light within reach. Safety measures in place.    Pt is a moderate risk for falls but refused to have the bed alarm on.     O2 sat down to 86% at room air. She refused O2, she stated she cannot have the nasal cannula on because the rash she has on her nose, offered a face mask, Pt refused.     Pt offered other medications available for her, like Ibuprofen, Flexeril, and Atarax. Pt refused all of them    00:00 Pt has not been compliant with treatment and very verbally agressive with hospital staff. She's refusing bed alarm. She keeps getting out of bed many times with SCDs plugged to the bed, encouraged her to remove them before getting up but she yells and says she won't. She has been pulling off the telemetry leads and don't let this RN to place them back on, she says she knows how to do it, she won't listen about how they need to be in a specific spot.     01:30 Dr Cisneros at bedside talking to Pt. Aware od Pts Oxygen levels at RA. Order received to Dc Telemetry.     04:21 Pt requested her muscle relaxant, Flexeril given as per MAR. Pt left the pill on the bedside table and she said she will not take it now. Pt got more verbally aggressive and asked me to leave the room. So unable to tell if she took the pill or not.    05:20 Pt refused to take medications from this RN, Charge RN present. Pt requests her medications to be given by the day shift nurse. Also, Pt refused to have the PCA pump settings to be changed to follow the new order. Dr Cisneros notified by Charge RN.    07:30 Bedside report given to next shift RNGenevieve. Care released.

## 2023-12-04 NOTE — PROGRESS NOTES
"Patient demanding IV site removal for discharge, removed by this RN. MD Charles placed AMA discharge order per nursing staff request. Patient has been inappropriately utilizing staff assist button to summon staff to bedside, education provided on using the staff assist button for emergency use.     Discharge paperwork provided to patient, I then explained process of leaving against medical advice. Patient verbally hostile at this point, demanding MD comes to bedside to review labs, etc. Therapeutic communication attempted with patient, she continues to be verbally aggressive and demeaning to staff members, stating staff members are \"neglecting\" patient, and \"have no idea what they're doing.\"  "

## 2023-12-04 NOTE — PROGRESS NOTES
Discharge instructions given and discussed, signed copy in chart. Pt verbalized understanding and all questions answered. Yes prescriptions discussed, sent to nearby pharmacy. Pt discharged home in stable condition on no O2 via ambulation escorted by family. Personal belongings sent with patient. IV previously removed and tolerated well. Tele box removed prior as well, monitor tech notified.

## 2023-12-04 NOTE — PROGRESS NOTES
This supervisor responded to the patients call light, patient expressing frustration that that PCA pump is incorrect in its dosage. The supervisor verified that the PCA pump matched the order in the patients MAR, a picture of the pump was also sent to MD Cisneros to verified that the order and pump were as she intended. Patient visually agitated, moving her arms around when speaking and standing up and sitting back down multiple times during the conversation. Patient wanting to be discharge or to leave AMA. Education provided that MD Cisneros would be unable to place discharge orders at this time and that she would have to wait to be evaluated for discharge by the attending or to leave AMA. Patient requesting for PCA pump to be set back to prior setting MD Cisneros notified of request.

## 2023-12-04 NOTE — PROGRESS NOTES
Dilaudid PCA with 0ml waste, pharmacy notified. Line had been dry as pt refused night shift RN to touch pump earlier on this AM. Evangelina, Charge RN, as a witness.

## 2024-06-20 DIAGNOSIS — J45.30 MILD PERSISTENT ASTHMA WITHOUT COMPLICATION: ICD-10-CM

## 2024-06-21 RX ORDER — ALBUTEROL SULFATE 90 UG/1
2 AEROSOL, METERED RESPIRATORY (INHALATION) EVERY 6 HOURS PRN
Qty: 18 EACH | Refills: 5 | Status: SHIPPED | OUTPATIENT
Start: 2024-06-21

## 2024-06-28 RX ORDER — FLUTICASONE PROPIONATE 44 UG/1
2 AEROSOL, METERED RESPIRATORY (INHALATION) 2 TIMES DAILY
COMMUNITY

## 2024-06-30 RX ORDER — FLUTICASONE PROPIONATE 44 UG/1
2 AEROSOL, METERED RESPIRATORY (INHALATION) 2 TIMES DAILY
OUTPATIENT
Start: 2024-06-30

## 2024-11-22 ENCOUNTER — HOSPITAL ENCOUNTER (EMERGENCY)
Facility: MEDICAL CENTER | Age: 24
DRG: 811 | End: 2024-11-23
Attending: STUDENT IN AN ORGANIZED HEALTH CARE EDUCATION/TRAINING PROGRAM
Payer: COMMERCIAL

## 2024-11-22 ENCOUNTER — APPOINTMENT (OUTPATIENT)
Dept: RADIOLOGY | Facility: MEDICAL CENTER | Age: 24
DRG: 811 | End: 2024-11-22
Attending: STUDENT IN AN ORGANIZED HEALTH CARE EDUCATION/TRAINING PROGRAM
Payer: COMMERCIAL

## 2024-11-22 DIAGNOSIS — D57.00 SICKLE CELL CRISIS (HCC): ICD-10-CM

## 2024-11-22 LAB
ALBUMIN SERPL BCP-MCNC: 4.6 G/DL (ref 3.2–4.9)
ALBUMIN/GLOB SERPL: 1.3 G/DL
ALP SERPL-CCNC: 81 U/L (ref 30–99)
ALT SERPL-CCNC: 19 U/L (ref 2–50)
ANION GAP SERPL CALC-SCNC: 11 MMOL/L (ref 7–16)
ANISOCYTOSIS BLD QL SMEAR: ABNORMAL
AST SERPL-CCNC: 39 U/L (ref 12–45)
BASOPHILS # BLD AUTO: 0 % (ref 0–1.8)
BASOPHILS # BLD: 0 K/UL (ref 0–0.12)
BILIRUB SERPL-MCNC: 1.6 MG/DL (ref 0.1–1.5)
BUN SERPL-MCNC: 7 MG/DL (ref 8–22)
CALCIUM ALBUM COR SERPL-MCNC: 8.8 MG/DL (ref 8.5–10.5)
CALCIUM SERPL-MCNC: 9.3 MG/DL (ref 8.5–10.5)
CHLORIDE SERPL-SCNC: 104 MMOL/L (ref 96–112)
CO2 SERPL-SCNC: 21 MMOL/L (ref 20–33)
CREAT SERPL-MCNC: 0.65 MG/DL (ref 0.5–1.4)
EOSINOPHIL # BLD AUTO: 0.14 K/UL (ref 0–0.51)
EOSINOPHIL NFR BLD: 0.9 % (ref 0–6.9)
ERYTHROCYTE [DISTWIDTH] IN BLOOD BY AUTOMATED COUNT: 70.4 FL (ref 35.9–50)
GFR SERPLBLD CREATININE-BSD FMLA CKD-EPI: 127 ML/MIN/1.73 M 2
GLOBULIN SER CALC-MCNC: 3.5 G/DL (ref 1.9–3.5)
GLUCOSE SERPL-MCNC: 104 MG/DL (ref 65–99)
HCT VFR BLD AUTO: 23.9 % (ref 37–47)
HGB BLD-MCNC: 8.4 G/DL (ref 12–16)
HGB RETIC QN AUTO: 30.9 PG/CELL (ref 29–35)
HYPOCHROMIA BLD QL SMEAR: ABNORMAL
IMM RETICS NFR: 28.5 % (ref 2.6–16.1)
LYMPHOCYTES # BLD AUTO: 2.87 K/UL (ref 1–4.8)
LYMPHOCYTES NFR BLD: 18.4 % (ref 22–41)
MACROCYTES BLD QL SMEAR: ABNORMAL
MANUAL DIFF BLD: NORMAL
MCH RBC QN AUTO: 32.4 PG (ref 27–33)
MCHC RBC AUTO-ENTMCNC: 35.1 G/DL (ref 32.2–35.5)
MCV RBC AUTO: 92.3 FL (ref 81.4–97.8)
METAMYELOCYTES NFR BLD MANUAL: 0.9 %
MONOCYTES # BLD AUTO: 1.5 K/UL (ref 0–0.85)
MONOCYTES NFR BLD AUTO: 9.6 % (ref 0–13.4)
MORPHOLOGY BLD-IMP: NORMAL
NEUTROPHILS # BLD AUTO: 10.95 K/UL (ref 1.82–7.42)
NEUTROPHILS NFR BLD: 70.2 % (ref 44–72)
NRBC # BLD AUTO: 0.85 K/UL
NRBC BLD-RTO: 5.5 /100 WBC (ref 0–0.2)
PLATELET # BLD AUTO: 358 K/UL (ref 164–446)
PLATELET BLD QL SMEAR: NORMAL
PMV BLD AUTO: 9.1 FL (ref 9–12.9)
POIKILOCYTOSIS BLD QL SMEAR: NORMAL
POLYCHROMASIA BLD QL SMEAR: NORMAL
POTASSIUM SERPL-SCNC: 4.4 MMOL/L (ref 3.6–5.5)
PROT SERPL-MCNC: 8.1 G/DL (ref 6–8.2)
RBC # BLD AUTO: 2.59 M/UL (ref 4.2–5.4)
RBC BLD AUTO: PRESENT
RETICS # AUTO: 0.51 M/UL (ref 0.04–0.12)
RETICS/RBC NFR: 20.4 % (ref 0.8–2.6)
SICKLE CELLS BLD QL SMEAR: NORMAL
SODIUM SERPL-SCNC: 136 MMOL/L (ref 135–145)
TARGETS BLD QL SMEAR: NORMAL
WBC # BLD AUTO: 15.6 K/UL (ref 4.8–10.8)

## 2024-11-22 PROCEDURE — 80053 COMPREHEN METABOLIC PANEL: CPT

## 2024-11-22 PROCEDURE — 85007 BL SMEAR W/DIFF WBC COUNT: CPT

## 2024-11-22 PROCEDURE — 85027 COMPLETE CBC AUTOMATED: CPT

## 2024-11-22 PROCEDURE — 96374 THER/PROPH/DIAG INJ IV PUSH: CPT

## 2024-11-22 PROCEDURE — 700111 HCHG RX REV CODE 636 W/ 250 OVERRIDE (IP): Mod: UD | Performed by: STUDENT IN AN ORGANIZED HEALTH CARE EDUCATION/TRAINING PROGRAM

## 2024-11-22 PROCEDURE — 99284 EMERGENCY DEPT VISIT MOD MDM: CPT

## 2024-11-22 PROCEDURE — 85046 RETICYTE/HGB CONCENTRATE: CPT

## 2024-11-22 PROCEDURE — 96375 TX/PRO/DX INJ NEW DRUG ADDON: CPT

## 2024-11-22 PROCEDURE — 96376 TX/PRO/DX INJ SAME DRUG ADON: CPT

## 2024-11-22 PROCEDURE — 36415 COLL VENOUS BLD VENIPUNCTURE: CPT

## 2024-11-22 PROCEDURE — 700105 HCHG RX REV CODE 258: Mod: UD | Performed by: STUDENT IN AN ORGANIZED HEALTH CARE EDUCATION/TRAINING PROGRAM

## 2024-11-22 PROCEDURE — 71045 X-RAY EXAM CHEST 1 VIEW: CPT

## 2024-11-22 RX ORDER — DIPHENHYDRAMINE HYDROCHLORIDE 50 MG/ML
25 INJECTION INTRAMUSCULAR; INTRAVENOUS ONCE
Status: COMPLETED | OUTPATIENT
Start: 2024-11-22 | End: 2024-11-22

## 2024-11-22 RX ORDER — HYDROMORPHONE HYDROCHLORIDE 1 MG/ML
1 INJECTION, SOLUTION INTRAMUSCULAR; INTRAVENOUS; SUBCUTANEOUS ONCE
Status: COMPLETED | OUTPATIENT
Start: 2024-11-22 | End: 2024-11-22

## 2024-11-22 RX ORDER — SODIUM CHLORIDE 9 MG/ML
1000 INJECTION, SOLUTION INTRAVENOUS ONCE
Status: COMPLETED | OUTPATIENT
Start: 2024-11-22 | End: 2024-11-23

## 2024-11-22 RX ADMIN — HYDROMORPHONE HYDROCHLORIDE 1 MG: 1 INJECTION, SOLUTION INTRAMUSCULAR; INTRAVENOUS; SUBCUTANEOUS at 21:59

## 2024-11-22 RX ADMIN — DIPHENHYDRAMINE HYDROCHLORIDE 25 MG: 50 INJECTION, SOLUTION INTRAMUSCULAR; INTRAVENOUS at 23:28

## 2024-11-22 RX ADMIN — SODIUM CHLORIDE 1000 ML: 9 INJECTION, SOLUTION INTRAVENOUS at 21:53

## 2024-11-22 RX ADMIN — HYDROMORPHONE HYDROCHLORIDE 1 MG: 1 INJECTION, SOLUTION INTRAMUSCULAR; INTRAVENOUS; SUBCUTANEOUS at 22:20

## 2024-11-22 RX ADMIN — HYDROMORPHONE HYDROCHLORIDE 1 MG: 1 INJECTION, SOLUTION INTRAMUSCULAR; INTRAVENOUS; SUBCUTANEOUS at 23:11

## 2024-11-22 ASSESSMENT — FIBROSIS 4 INDEX: FIB4 SCORE: 1.05

## 2024-11-23 ENCOUNTER — APPOINTMENT (OUTPATIENT)
Dept: RADIOLOGY | Facility: MEDICAL CENTER | Age: 24
End: 2024-11-23
Attending: EMERGENCY MEDICINE
Payer: COMMERCIAL

## 2024-11-23 ENCOUNTER — PHARMACY VISIT (OUTPATIENT)
Dept: PHARMACY | Facility: MEDICAL CENTER | Age: 24
End: 2024-11-23
Payer: COMMERCIAL

## 2024-11-23 ENCOUNTER — HOSPITAL ENCOUNTER (INPATIENT)
Facility: MEDICAL CENTER | Age: 24
LOS: 1 days | End: 2024-11-24
Attending: EMERGENCY MEDICINE | Admitting: FAMILY MEDICINE
Payer: COMMERCIAL

## 2024-11-23 VITALS
DIASTOLIC BLOOD PRESSURE: 78 MMHG | OXYGEN SATURATION: 95 % | SYSTOLIC BLOOD PRESSURE: 123 MMHG | HEIGHT: 62 IN | TEMPERATURE: 97.7 F | RESPIRATION RATE: 16 BRPM | HEART RATE: 70 BPM | WEIGHT: 128 LBS | BODY MASS INDEX: 23.55 KG/M2

## 2024-11-23 PROBLEM — D57.00 ACUTE SICKLE CELL CRISIS (HCC): Status: ACTIVE | Noted: 2024-11-23

## 2024-11-23 LAB
APPEARANCE UR: CLEAR
BASOPHILS # BLD AUTO: 0.3 % (ref 0–1.8)
BASOPHILS # BLD: 0.05 K/UL (ref 0–0.12)
BILIRUB UR QL STRIP.AUTO: NEGATIVE
COLOR UR: YELLOW
EOSINOPHIL # BLD AUTO: 0.04 K/UL (ref 0–0.51)
EOSINOPHIL NFR BLD: 0.3 % (ref 0–6.9)
ERYTHROCYTE [DISTWIDTH] IN BLOOD BY AUTOMATED COUNT: 70 FL (ref 35.9–50)
GLUCOSE UR STRIP.AUTO-MCNC: NEGATIVE MG/DL
HCG SERPL QL: NEGATIVE
HCT VFR BLD AUTO: 22.9 % (ref 37–47)
HGB BLD-MCNC: 8 G/DL (ref 12–16)
IMM GRANULOCYTES # BLD AUTO: 0.15 K/UL (ref 0–0.11)
IMM GRANULOCYTES NFR BLD AUTO: 1 % (ref 0–0.9)
KETONES UR STRIP.AUTO-MCNC: NEGATIVE MG/DL
LEUKOCYTE ESTERASE UR QL STRIP.AUTO: NEGATIVE
LIPASE SERPL-CCNC: 11 U/L (ref 11–82)
LYMPHOCYTES # BLD AUTO: 2.89 K/UL (ref 1–4.8)
LYMPHOCYTES NFR BLD: 18.4 % (ref 22–41)
MAGNESIUM SERPL-MCNC: 1.8 MG/DL (ref 1.5–2.5)
MCH RBC QN AUTO: 32.7 PG (ref 27–33)
MCHC RBC AUTO-ENTMCNC: 34.9 G/DL (ref 32.2–35.5)
MCV RBC AUTO: 93.5 FL (ref 81.4–97.8)
MICRO URNS: NORMAL
MONOCYTES # BLD AUTO: 1.77 K/UL (ref 0–0.85)
MONOCYTES NFR BLD AUTO: 11.3 % (ref 0–13.4)
NEUTROPHILS # BLD AUTO: 10.81 K/UL (ref 1.82–7.42)
NEUTROPHILS NFR BLD: 68.7 % (ref 44–72)
NITRITE UR QL STRIP.AUTO: NEGATIVE
NRBC # BLD AUTO: 1.15 K/UL
NRBC BLD-RTO: 7.3 /100 WBC (ref 0–0.2)
PH UR STRIP.AUTO: 6 [PH] (ref 5–8)
PLATELET # BLD AUTO: 364 K/UL (ref 164–446)
PMV BLD AUTO: 9.7 FL (ref 9–12.9)
PROCALCITONIN SERPL-MCNC: 0.14 NG/ML
PROT UR QL STRIP: NEGATIVE MG/DL
RBC # BLD AUTO: 2.45 M/UL (ref 4.2–5.4)
RBC UR QL AUTO: NEGATIVE
SP GR UR STRIP.AUTO: 1
UROBILINOGEN UR STRIP.AUTO-MCNC: 1 EU/DL
WBC # BLD AUTO: 15.7 K/UL (ref 4.8–10.8)

## 2024-11-23 PROCEDURE — 700102 HCHG RX REV CODE 250 W/ 637 OVERRIDE(OP)

## 2024-11-23 PROCEDURE — 84145 PROCALCITONIN (PCT): CPT

## 2024-11-23 PROCEDURE — 96376 TX/PRO/DX INJ SAME DRUG ADON: CPT

## 2024-11-23 PROCEDURE — 96374 THER/PROPH/DIAG INJ IV PUSH: CPT

## 2024-11-23 PROCEDURE — 36415 COLL VENOUS BLD VENIPUNCTURE: CPT

## 2024-11-23 PROCEDURE — 770006 HCHG ROOM/CARE - MED/SURG/GYN SEMI*

## 2024-11-23 PROCEDURE — RXMED WILLOW AMBULATORY MEDICATION CHARGE: Performed by: STUDENT IN AN ORGANIZED HEALTH CARE EDUCATION/TRAINING PROGRAM

## 2024-11-23 PROCEDURE — 81003 URINALYSIS AUTO W/O SCOPE: CPT

## 2024-11-23 PROCEDURE — A9270 NON-COVERED ITEM OR SERVICE: HCPCS

## 2024-11-23 PROCEDURE — 700111 HCHG RX REV CODE 636 W/ 250 OVERRIDE (IP): Mod: UD | Performed by: STUDENT IN AN ORGANIZED HEALTH CARE EDUCATION/TRAINING PROGRAM

## 2024-11-23 PROCEDURE — 96375 TX/PRO/DX INJ NEW DRUG ADDON: CPT

## 2024-11-23 PROCEDURE — 99222 1ST HOSP IP/OBS MODERATE 55: CPT | Mod: GC | Performed by: FAMILY MEDICINE

## 2024-11-23 PROCEDURE — 302129 PCA PLUS: Performed by: FAMILY MEDICINE

## 2024-11-23 PROCEDURE — 71045 X-RAY EXAM CHEST 1 VIEW: CPT

## 2024-11-23 PROCEDURE — 700111 HCHG RX REV CODE 636 W/ 250 OVERRIDE (IP): Mod: JZ

## 2024-11-23 PROCEDURE — 84703 CHORIONIC GONADOTROPIN ASSAY: CPT

## 2024-11-23 PROCEDURE — 700111 HCHG RX REV CODE 636 W/ 250 OVERRIDE (IP)

## 2024-11-23 PROCEDURE — 83690 ASSAY OF LIPASE: CPT

## 2024-11-23 PROCEDURE — 99285 EMERGENCY DEPT VISIT HI MDM: CPT

## 2024-11-23 PROCEDURE — 700111 HCHG RX REV CODE 636 W/ 250 OVERRIDE (IP): Mod: UD | Performed by: EMERGENCY MEDICINE

## 2024-11-23 PROCEDURE — 85025 COMPLETE CBC W/AUTO DIFF WBC: CPT

## 2024-11-23 PROCEDURE — 83735 ASSAY OF MAGNESIUM: CPT

## 2024-11-23 PROCEDURE — 700105 HCHG RX REV CODE 258: Mod: UD | Performed by: EMERGENCY MEDICINE

## 2024-11-23 RX ORDER — HYDROXYUREA 500 MG/1
1500 CAPSULE ORAL DAILY
Status: DISCONTINUED | OUTPATIENT
Start: 2024-11-23 | End: 2024-11-24 | Stop reason: HOSPADM

## 2024-11-23 RX ORDER — LORATADINE 10 MG/1
10 TABLET ORAL
COMMUNITY

## 2024-11-23 RX ORDER — OXYCODONE AND ACETAMINOPHEN 5; 325 MG/1; MG/1
1 TABLET ORAL EVERY 4 HOURS PRN
Qty: 10 TABLET | Refills: 0 | Status: SHIPPED | OUTPATIENT
Start: 2024-11-23 | End: 2024-11-28

## 2024-11-23 RX ORDER — PROCHLORPERAZINE EDISYLATE 5 MG/ML
5-10 INJECTION INTRAMUSCULAR; INTRAVENOUS EVERY 4 HOURS PRN
Status: DISCONTINUED | OUTPATIENT
Start: 2024-11-23 | End: 2024-11-24 | Stop reason: HOSPADM

## 2024-11-23 RX ORDER — HEPARIN SODIUM 5000 [USP'U]/ML
5000 INJECTION, SOLUTION INTRAVENOUS; SUBCUTANEOUS EVERY 8 HOURS
Status: DISCONTINUED | OUTPATIENT
Start: 2024-11-24 | End: 2024-11-24 | Stop reason: HOSPADM

## 2024-11-23 RX ORDER — HYDROMORPHONE HYDROCHLORIDE 1 MG/ML
0.5 INJECTION, SOLUTION INTRAMUSCULAR; INTRAVENOUS; SUBCUTANEOUS ONCE
Status: COMPLETED | OUTPATIENT
Start: 2024-11-23 | End: 2024-11-23

## 2024-11-23 RX ORDER — OXYCODONE HYDROCHLORIDE 10 MG/1
10 TABLET ORAL
Status: DISCONTINUED | OUTPATIENT
Start: 2024-11-23 | End: 2024-11-23

## 2024-11-23 RX ORDER — ONDANSETRON 2 MG/ML
4 INJECTION INTRAMUSCULAR; INTRAVENOUS EVERY 4 HOURS PRN
Status: DISCONTINUED | OUTPATIENT
Start: 2024-11-23 | End: 2024-11-24 | Stop reason: HOSPADM

## 2024-11-23 RX ORDER — OXYCODONE HYDROCHLORIDE 5 MG/1
5 TABLET ORAL
Status: DISCONTINUED | OUTPATIENT
Start: 2024-11-23 | End: 2024-11-23

## 2024-11-23 RX ORDER — DIPHENHYDRAMINE HCL 25 MG
25 TABLET ORAL EVERY 6 HOURS PRN
Status: DISCONTINUED | OUTPATIENT
Start: 2024-11-23 | End: 2024-11-24 | Stop reason: HOSPADM

## 2024-11-23 RX ORDER — HYDROMORPHONE HYDROCHLORIDE 1 MG/ML
0.5 INJECTION, SOLUTION INTRAMUSCULAR; INTRAVENOUS; SUBCUTANEOUS
Status: DISCONTINUED | OUTPATIENT
Start: 2024-11-23 | End: 2024-11-23

## 2024-11-23 RX ORDER — ACETAMINOPHEN 500 MG
1000 TABLET ORAL EVERY 6 HOURS
Status: DISCONTINUED | OUTPATIENT
Start: 2024-11-23 | End: 2024-11-24 | Stop reason: HOSPADM

## 2024-11-23 RX ORDER — PROMETHAZINE HYDROCHLORIDE 25 MG/1
12.5-25 SUPPOSITORY RECTAL EVERY 4 HOURS PRN
Status: DISCONTINUED | OUTPATIENT
Start: 2024-11-23 | End: 2024-11-24 | Stop reason: HOSPADM

## 2024-11-23 RX ORDER — HYDROMORPHONE HYDROCHLORIDE 1 MG/ML
1 INJECTION, SOLUTION INTRAMUSCULAR; INTRAVENOUS; SUBCUTANEOUS ONCE
Status: COMPLETED | OUTPATIENT
Start: 2024-11-23 | End: 2024-11-23

## 2024-11-23 RX ORDER — FOLIC ACID 1 MG/1
1 TABLET ORAL DAILY
Status: DISCONTINUED | OUTPATIENT
Start: 2024-11-23 | End: 2024-11-24 | Stop reason: HOSPADM

## 2024-11-23 RX ORDER — DIPHENHYDRAMINE HYDROCHLORIDE 50 MG/ML
25 INJECTION INTRAMUSCULAR; INTRAVENOUS ONCE
Status: COMPLETED | OUTPATIENT
Start: 2024-11-23 | End: 2024-11-23

## 2024-11-23 RX ORDER — ONDANSETRON 4 MG/1
4 TABLET, ORALLY DISINTEGRATING ORAL EVERY 4 HOURS PRN
Status: DISCONTINUED | OUTPATIENT
Start: 2024-11-23 | End: 2024-11-24 | Stop reason: HOSPADM

## 2024-11-23 RX ORDER — IBUPROFEN 200 MG
200 TABLET ORAL EVERY 6 HOURS PRN
Status: ON HOLD | COMMUNITY
End: 2024-11-24

## 2024-11-23 RX ORDER — PROMETHAZINE HYDROCHLORIDE 25 MG/1
12.5-25 TABLET ORAL EVERY 4 HOURS PRN
Status: DISCONTINUED | OUTPATIENT
Start: 2024-11-23 | End: 2024-11-24 | Stop reason: HOSPADM

## 2024-11-23 RX ORDER — ACETAMINOPHEN 500 MG
500 TABLET ORAL EVERY 6 HOURS PRN
COMMUNITY

## 2024-11-23 RX ORDER — HYDROMORPHONE HYDROCHLORIDE 1 MG/ML
1 INJECTION, SOLUTION INTRAMUSCULAR; INTRAVENOUS; SUBCUTANEOUS
Status: DISCONTINUED | OUTPATIENT
Start: 2024-11-23 | End: 2024-11-23

## 2024-11-23 RX ORDER — KETOROLAC TROMETHAMINE 15 MG/ML
15 INJECTION, SOLUTION INTRAMUSCULAR; INTRAVENOUS EVERY 6 HOURS PRN
Status: DISCONTINUED | OUTPATIENT
Start: 2024-11-23 | End: 2024-11-23

## 2024-11-23 RX ORDER — FLUTICASONE PROPIONATE 44 UG/1
2 AEROSOL, METERED RESPIRATORY (INHALATION)
Status: DISCONTINUED | OUTPATIENT
Start: 2024-11-23 | End: 2024-11-24 | Stop reason: HOSPADM

## 2024-11-23 RX ORDER — ACETAMINOPHEN 500 MG
1000 TABLET ORAL EVERY 6 HOURS PRN
Status: DISCONTINUED | OUTPATIENT
Start: 2024-11-28 | End: 2024-11-24 | Stop reason: HOSPADM

## 2024-11-23 RX ORDER — SODIUM CHLORIDE 9 MG/ML
1000 INJECTION, SOLUTION INTRAVENOUS ONCE
Status: COMPLETED | OUTPATIENT
Start: 2024-11-23 | End: 2024-11-23

## 2024-11-23 RX ADMIN — HYDROMORPHONE HYDROCHLORIDE 0.5 MG: 1 INJECTION, SOLUTION INTRAMUSCULAR; INTRAVENOUS; SUBCUTANEOUS at 16:59

## 2024-11-23 RX ADMIN — FOLIC ACID 1 MG: 1 TABLET ORAL at 16:47

## 2024-11-23 RX ADMIN — HYDROMORPHONE HYDROCHLORIDE 0.5 MG: 1 INJECTION, SOLUTION INTRAMUSCULAR; INTRAVENOUS; SUBCUTANEOUS at 20:59

## 2024-11-23 RX ADMIN — DIPHENHYDRAMINE HYDROCHLORIDE 25 MG: 50 INJECTION, SOLUTION INTRAMUSCULAR; INTRAVENOUS at 14:28

## 2024-11-23 RX ADMIN — HYDROMORPHONE HYDROCHLORIDE 0.5 MG: 1 INJECTION, SOLUTION INTRAMUSCULAR; INTRAVENOUS; SUBCUTANEOUS at 14:28

## 2024-11-23 RX ADMIN — HYDROMORPHONE HYDROCHLORIDE 0.5 MG: 1 INJECTION, SOLUTION INTRAMUSCULAR; INTRAVENOUS; SUBCUTANEOUS at 22:00

## 2024-11-23 RX ADMIN — HYDROMORPHONE HYDROCHLORIDE 1 MG: 1 INJECTION, SOLUTION INTRAMUSCULAR; INTRAVENOUS; SUBCUTANEOUS at 12:22

## 2024-11-23 RX ADMIN — SODIUM CHLORIDE 1000 ML: 9 INJECTION, SOLUTION INTRAVENOUS at 12:26

## 2024-11-23 RX ADMIN — HYDROMORPHONE HYDROCHLORIDE 1 MG: 1 INJECTION, SOLUTION INTRAMUSCULAR; INTRAVENOUS; SUBCUTANEOUS at 13:02

## 2024-11-23 RX ADMIN — ACETAMINOPHEN 1000 MG: 500 TABLET ORAL at 16:02

## 2024-11-23 RX ADMIN — KETOROLAC TROMETHAMINE 15 MG: 15 INJECTION, SOLUTION INTRAMUSCULAR; INTRAVENOUS at 16:46

## 2024-11-23 RX ADMIN — DIPHENHYDRAMINE HYDROCHLORIDE 25 MG: 25 TABLET ORAL at 17:57

## 2024-11-23 RX ADMIN — OXYCODONE HYDROCHLORIDE 10 MG: 10 TABLET ORAL at 16:01

## 2024-11-23 RX ADMIN — DIPHENHYDRAMINE HYDROCHLORIDE 25 MG: 50 INJECTION, SOLUTION INTRAMUSCULAR; INTRAVENOUS at 00:11

## 2024-11-23 RX ADMIN — HYDROMORPHONE HYDROCHLORIDE 1 MG: 1 INJECTION, SOLUTION INTRAMUSCULAR; INTRAVENOUS; SUBCUTANEOUS at 00:12

## 2024-11-23 RX ADMIN — HYDROXYUREA 1500 MG: 500 CAPSULE ORAL at 20:16

## 2024-11-23 RX ADMIN — OXYCODONE HYDROCHLORIDE 10 MG: 10 TABLET ORAL at 20:09

## 2024-11-23 ASSESSMENT — COGNITIVE AND FUNCTIONAL STATUS - GENERAL
MOBILITY SCORE: 24
SUGGESTED CMS G CODE MODIFIER MOBILITY: CH
SUGGESTED CMS G CODE MODIFIER DAILY ACTIVITY: CH
DAILY ACTIVITIY SCORE: 24

## 2024-11-23 ASSESSMENT — PAIN DESCRIPTION - PAIN TYPE
TYPE: ACUTE PAIN
TYPE: CHRONIC PAIN
TYPE: ACUTE PAIN
TYPE: CHRONIC PAIN
TYPE: ACUTE PAIN

## 2024-11-23 ASSESSMENT — FIBROSIS 4 INDEX
FIB4 SCORE: 0.57
FIB4 SCORE: 0.57

## 2024-11-23 ASSESSMENT — LIFESTYLE VARIABLES
AVERAGE NUMBER OF DAYS PER WEEK YOU HAVE A DRINK CONTAINING ALCOHOL: 0
ALCOHOL_USE: NO
TOTAL SCORE: 0
TOTAL SCORE: 0
HAVE YOU EVER FELT YOU SHOULD CUT DOWN ON YOUR DRINKING: NO
TOTAL SCORE: 0
EVER FELT BAD OR GUILTY ABOUT YOUR DRINKING: NO
HOW MANY TIMES IN THE PAST YEAR HAVE YOU HAD 5 OR MORE DRINKS IN A DAY: 0
EVER HAD A DRINK FIRST THING IN THE MORNING TO STEADY YOUR NERVES TO GET RID OF A HANGOVER: NO
HAVE PEOPLE ANNOYED YOU BY CRITICIZING YOUR DRINKING: NO
ON A TYPICAL DAY WHEN YOU DRINK ALCOHOL HOW MANY DRINKS DO YOU HAVE: 0
DOES PATIENT WANT TO STOP DRINKING: NO
CONSUMPTION TOTAL: NEGATIVE

## 2024-11-23 NOTE — ED NOTES
Taken patient from triage waiting room, brought by Er tech, via wheelchair,  alert/ oriented x 4.Verified patient identification.  Assumed patient care.   Placed on patient room. Changed clothes to hospital gown. Connected to cardiac monitor.   Given the call light and instructed to call for any assistance needed/ or concerns.   Bed on lowest position, side rails up, breaks locked. Awaiting for ERP.    Pt in so much pain

## 2024-11-23 NOTE — ED PROVIDER NOTES
" 8.0ED Provider Note    CHIEF COMPLAINT  Chief Complaint   Patient presents with    Generalized Body Aches     Bib ems from home. Pt complaining of generalized body aches lasting from 0830 yesterday. Pt was seen here last night for the same reason. Pt has a history sickle cell anemia and believes it is na \"flair up.\"       EXTERNAL RECORDS REVIEWED  Reviewed the laboratory values particularly hemoglobin 8.4 that was completed earlier this morning.    HPI/ROS    OUTSIDE HISTORIAN(S):  Mother is at bedside adding history review of systems saying the patient is having severe pain since going home.    Henry Anand is a 24 y.o. female who presents with complaint of sickle cell anemia.  The patient has had a sickle cell exacerbation.  She states her 830 yesterday, complete body aches and generalized aches.  The patient has fever, shakes, chills, sweats, nausea, vomiting.  She has been pregnant.  She was seen here earlier this morning was discharged after receiving pain medication.  The patient said he was at home and she would vomit if pain came back severely.  She states this happened multiple times she has been hospitalized for this in the past.    PAST MEDICAL HISTORY   has a past medical history of Asthma (2021), Gallstones with biliary obstruction (11/2014), Heart murmur, and Sickle cell anemia without crisis (HCC) (2001).    SURGICAL HISTORY   has a past surgical history that includes cholecystectomy (11/3/2014); ercp (N/A, 10/30/2014); other (2018); septoturbinoplasty (Bilateral, 5/4/2021); antrostomy (5/4/2021); and ethmoidectomy, endoscopic (5/4/2021).    FAMILY HISTORY  Family History   Problem Relation Age of Onset    Stroke Sister     Anemia Sister         Sickle cell    Sleep Apnea Mother     Sleep Apnea Father     Sleep Apnea Brother        SOCIAL HISTORY  Social History     Tobacco Use    Smoking status: Never    Smokeless tobacco: Never   Vaping Use    Vaping status: Never Used " "  Substance and Sexual Activity    Alcohol use: Not Currently    Drug use: Not Currently     Types: Inhaled     Comment: job , 2 joints    Sexual activity: Not on file       CURRENT MEDICATIONS  Home Medications       Reviewed by Kathleen Ospina PhT (Pharmacy Tech) on 11/23/24 at 1307  Med List Status: Complete     Medication Last Dose Status   acetaminophen (TYLENOL) 500 MG Tab 11/22/2024 Active   Cholecalciferol (VITAMIN D3) 125 MCG (5000 UT) Cap 11/15/2024 Active   fluticasone (FLOVENT HFA) 44 MCG/ACT Aerosol 11/22/2024 Active   folic acid (FOLVITE) 1 MG Tab 11/13/2024 Active   hydroxyurea (HYDREA) 500 MG Cap 11/13/2024 Active   ibuprofen (MOTRIN) 200 MG Tab 11/22/2024 Active   loratadine (CLARITIN) 10 MG Tab 11/2/2024 Active   oxyCODONE-acetaminophen (PERCOCET) 5-325 MG Tab 11/23/2024 Active   penicillin v potassium (VEETID) 250 MG Tab 11/22/2024 Active   VENTOLIN  (90 Base) MCG/ACT Aero Soln inhalation aerosol 11/2/2024 Active                  Audit from Redirected Encounters    **Home medications have not yet been reviewed for this encounter**         ALLERGIES  Allergies   Allergen Reactions    Haloperidol Unspecified     Received Haldol x 2 doses in [facility] and developed an acute dystonic reaction, treated with diphenhydramine       PHYSICAL EXAM  VITAL SIGNS: /56   Pulse 78   Temp 37.3 °C (99.2 °F) (Temporal)   Resp (!) 22   Ht 1.575 m (5' 2\")   Wt 54.4 kg (120 lb)   SpO2 96%   BMI 21.95 kg/m²      Nursing notes and vitals reviewed.  Constitutional: Well developed, Well nourished, No acute distress, Non-toxic appearance.   HENT: Normocephalic, Atraumatic, moist mucous membranes, no facial edema   Cardiovascular: Normal heart rate, Normal rhythm, No murmurs, No rubs, No gallops.   Thorax & Lungs: No respiratory distress, No rales, No rhonchi, No wheezing, No chest tenderness.   Abdomen: Bowel sounds normal, Soft, No tenderness, No guarding, No rebound, No masses, No " pulsatile masses.   Skin: Warm, Dry, No erythema, No rash.   Extremities: No deformity, no edema, good range of motion range of motion upper lower extremes bilaterally  Neurologic: Alert & oriented x 3, no focal abnormalities noted, acting appropriately on examination  Psychiatric: Anxious affect on evaluation      EKG/LABS  Sinus rhythm on monitor  I have independently interpreted this EKG  Results for orders placed or performed during the hospital encounter of 11/23/24   LIPASE    Collection Time: 11/23/24 11:42 AM   Result Value Ref Range    Lipase 11 11 - 82 U/L   MAGNESIUM    Collection Time: 11/23/24 11:42 AM   Result Value Ref Range    Magnesium 1.8 1.5 - 2.5 mg/dL   BETA-HCG QUALITATIVE SERUM    Collection Time: 11/23/24 11:42 AM   Result Value Ref Range    Beta-Hcg Qualitative Serum Negative Negative   PROCALCITONIN    Collection Time: 11/23/24 11:42 AM   Result Value Ref Range    Procalcitonin 0.14 <0.25 ng/mL   CBC WITH DIFFERENTIAL    Collection Time: 11/23/24 11:42 AM   Result Value Ref Range    WBC 15.7 (H) 4.8 - 10.8 K/uL    RBC 2.45 (L) 4.20 - 5.40 M/uL    Hemoglobin 8.0 (L) 12.0 - 16.0 g/dL    Hematocrit 22.9 (L) 37.0 - 47.0 %    MCV 93.5 81.4 - 97.8 fL    MCH 32.7 27.0 - 33.0 pg    MCHC 34.9 32.2 - 35.5 g/dL    RDW 70.0 (H) 35.9 - 50.0 fL    Platelet Count 364 164 - 446 K/uL    MPV 9.7 9.0 - 12.9 fL    Neutrophils-Polys 68.70 44.00 - 72.00 %    Lymphocytes 18.40 (L) 22.00 - 41.00 %    Monocytes 11.30 0.00 - 13.40 %    Eosinophils 0.30 0.00 - 6.90 %    Basophils 0.30 0.00 - 1.80 %    Immature Granulocytes 1.00 (H) 0.00 - 0.90 %    Nucleated RBC 7.30 (H) 0.00 - 0.20 /100 WBC    Neutrophils (Absolute) 10.81 (H) 1.82 - 7.42 K/uL    Lymphs (Absolute) 2.89 1.00 - 4.80 K/uL    Monos (Absolute) 1.77 (H) 0.00 - 0.85 K/uL    Eos (Absolute) 0.04 0.00 - 0.51 K/uL    Baso (Absolute) 0.05 0.00 - 0.12 K/uL    Immature Granulocytes (abs) 0.15 (H) 0.00 - 0.11 K/uL    NRBC (Absolute) 1.15 K/uL   URINALYSIS (UA)     Collection Time: 11/23/24 12:37 PM    Specimen: Urine   Result Value Ref Range    Color Yellow     Character Clear     Specific Gravity 1.005 <1.035    Ph 6.0 5.0 - 8.0    Glucose Negative Negative mg/dL    Ketones Negative Negative mg/dL    Protein Negative Negative mg/dL    Bilirubin Negative Negative    Urobilinogen, Urine 1.0 <=1.0 EU/dL    Nitrite Negative Negative    Leukocyte Esterase Negative Negative    Occult Blood Negative Negative    Micro Urine Req see below          RADIOLOGY/PROCEDURES   I have independently interpreted the diagnostic imaging associated with this visit and am waiting the final reading from the radiologist.   My preliminary interpretation is as follows: Chest x-ray is negative for infiltrate    Radiologist interpretation:  DX-CHEST-PORTABLE (1 VIEW)   Final Result      No evidence of acute cardiopulmonary process.          COURSE & MEDICAL DECISION MAKING    ASSESSMENT, COURSE AND PLAN  Care Narrative: This is a pleasant 24-year-old female presents with sickle cell crisis.  Here in the emerged department, she has evidence of anemia.  She was seen earlier today with a hemoglobin of 8.0 which is down from 8.4 from previous testing at earlier today.  The patient is requiring significant amount of pain medication.  She is Dilaudid 1 mg IV x 2 and then 0.5 x 1.  Benadryl and IV fluids were given.  Continues have pain.  She has no Inse pneumonia, notes of overwhelming infection requiring antibiotics.  Patient is afebrile as well.  Patient has no neurological deficits as well.  The patient be hospitalized for further evaluation and pain control.  The patient does have a leukocytosis of 15,700 I do believe secondary margination secondary to her pain pattern.  She has no Inse infection urinalysis, her x-ray was negative for pneumonia.  The patient will be hospitalized for evaluation of this as well        ADDITIONAL PROBLEMS MANAGED  Sickle cell anemia with current crisis.    DISPOSITION AND  DISCUSSIONS  I have discussed management of the patient with the following physicians and GABE's: Discussed with patient [facility] for hospitalization.        FINAL DIAGNOSIS  Sickle cell anemia  Chronic pain  Electronically signed by: Diego Heard D.O., 11/23/2024 12:32 PM

## 2024-11-23 NOTE — H&P
[facility] FAMILY MEDICINE HISTORY AND PHYSICAL    PATIENT ID:  NAME:  Henry Anand  MRN:               [id number]  YOB: 2000    Date of Admission: 11/23/2024     Attending: Valery Arroyo MD    Resident: Celsa Kimble MD     Primary Care Physician:  Cullen Castro M.D.    CC: Sickle cell crisis    HPI: Henry Anand is a 24 y.o. female with significant past medical history of sickle cell, who presented with acute onset of severe pain secondary to sickle cell crisis.  Patient reports her symptoms began last night consisting of severe pain in her upper and lower extremities with areas of focus being the knees and shins.  She denies any abdominal pain, nausea, vomiting, fevers or chills.  Patient states that she has had a similar presentation approximately 1 year ago.  She is currently on hydroxyurea 1500 mg daily and denies skipping any doses.  Patient had been evaluated in the ED in the early hours of the morning and was eventually discharged with adequate pain control however, her pain resumed and she presented back to the emergency room.      ER Course:  In the ED, vital signs largely within normal limits.  Labs: CBC notable for WBC 15.7, Hgb 8, otherwise grossly unremarkable.  Lipase 11, magnesium 1.8.  Urinalysis normal.  Procalcitonin normal 0.14 and beta-hCG negative.  Imaging: Chest x-ray normal without evidence of acute cardiopulmonary process.  Patient was treated with IV Dilaudid and given a 1 L NS bolus.    REVIEW OF SYSTEMS:   Ten systems reviewed and were negative except as noted in the HPI.                PAST MEDICAL HISTORY:  Past Medical History:   Diagnosis Date    Asthma 2021    asthma    Gallstones with biliary obstruction 11/2014    Heart murmur     Sickle cell anemia without crisis (HCC) 2001       PAST SURGICAL HISTORY:  Past Surgical History:   Procedure Laterality Date    SEPTOTURBINOPLASTY Bilateral 5/4/2021    Procedure: SEPTOPLASTY, NOSE, WITH TURBINOPLASTY;  Surgeon:  Precious Mackenzie M.D.;  Location: SURGERY SAME DAY [facility];  Service: Ent    ANTROSTOMY  5/4/2021    Procedure: MAXILLARY ANTROSTOMY-ENDOSCOPIC;  Surgeon: Precious Mackenzie M.D.;  Location: SURGERY SAME DAY [facility];  Service: Ent    ETHMOIDECTOMY, ENDOSCOPIC  5/4/2021    Procedure: ETHMOIDECTOMY, ENDOSCOPIC-FRONTAL, LANDMARK.;  Surgeon: Precious Mackenzie M.D.;  Location: SURGERY SAME DAY [facility];  Service: Ent    OTHER  2018    fall, facial surgeries    CHOLECYSTECTOMY  11/3/2014    ERCP N/A 10/30/2014       FAMILY HISTORY:  Family History   Problem Relation Age of Onset    Stroke Sister     Anemia Sister         Sickle cell    Sleep Apnea Mother     Sleep Apnea Father     Sleep Apnea Brother        SOCIAL HISTORY:   Pt lives by herself in [place].  Tobacco use: Denies  ETOH use: Denies  Drug use: Smokes marijuana    ALLERGIES:  Allergies   Allergen Reactions    Haloperidol Unspecified     Received Haldol x 2 doses in [facility] and developed an acute dystonic reaction, treated with diphenhydramine       OUTPATIENT MEDICATIONS:    Current Facility-Administered Medications:     Pharmacy Consult Request ...Pain Management Review 1 Each, 1 Each, Other, PHARMACY TO DOSE, Celsa Kimble M.D.    acetaminophen (Tylenol) tablet 1,000 mg, 1,000 mg, Oral, Q6HRS, 1,000 mg at 11/23/24 1602 **FOLLOWED BY** [START ON 11/28/2024] acetaminophen (Tylenol) tablet 1,000 mg, 1,000 mg, Oral, Q6HRS PRN, Celsa Kimble M.D.    oxyCODONE immediate-release (Roxicodone) tablet 5 mg, 5 mg, Oral, Q3HRS PRN **OR** oxyCODONE immediate release (Roxicodone) tablet 10 mg, 10 mg, Oral, Q3HRS PRN, 10 mg at 11/23/24 1601 **OR** HYDROmorphone (Dilaudid) injection 0.5 mg, 0.5 mg, Intravenous, Q3HRS PRN, Celsa Kimble M.D.    [START ON 11/24/2024] heparin injection 5,000 Units, 5,000 Units, Subcutaneous, Q8HRS, Celsa Kimble M.D.    ondansetron (Zofran) syringe/vial injection 4 mg, 4 mg, Intravenous, Q4HRS PRN, Celsa Kimble M.D.     ondansetron (Zofran ODT) dispertab 4 mg, 4 mg, Oral, Q4HRS PRN, Cesla Kimble M.D.    promethazine (Phenergan) tablet 12.5-25 mg, 12.5-25 mg, Oral, Q4HRS PRN, Celsa Kimble M.D.    promethazine (Phenergan) suppository 12.5-25 mg, 12.5-25 mg, Rectal, Q4HRS PRN, Celsa Kimble M.D.    prochlorperazine (Compazine) injection 5-10 mg, 5-10 mg, Intravenous, Q4HRS PRN, Celsa Kimble M.D.    fluticasone (Flovent HFA) 44 MCG/ACT inhaler 88 mcg, 2 Puff, Inhalation, BID PRN, Celsa Kimble M.D.    folic acid (Folvite) tablet 1 mg, 1 mg, Oral, DAILY, Celsa Kimble M.D.    hydroxyurea (Hydrea) capsule 1,500 mg, 1,500 mg, Oral, DAILY, Celsa Kimble M.D.    ketorolac (Toradol) 15 MG/ML injection 15 mg, 15 mg, Intravenous, Q6HRS PRN, Celsa Kimble M.D.    PHYSICAL EXAM:  Vitals:    24 1244 24 1259 24 1414 24 1601   BP: 138/81 (!) 151/82 124/56    Pulse: (!) 56 60 78    Resp:       Temp:       TempSrc:       SpO2: 96% 97% 96%    Weight:    54.5 kg (120 lb 2.4 oz)   Height:       , Temp (24hrs), Av.8 °C (98.3 °F), Min:36.5 °C (97.7 °F), Max:37.3 °C (99.2 °F)  , Pulse Oximetry: 96 %, O2 (LPM): 2, O2 Delivery Device: Nasal Cannula    General: Pleasant, well-appearing, no acute distress  Skin:  Pink, warm and dry.  No rashes  HEENT: NC/AT. PERRL. EOMI. MMM. No nasal discharge. Oropharynx nonerythematous without exudate/plaques  Lungs:  Symmetrical.  CTAB with no W/R/R.  Good air movement   Cardiovascular:  S1/S2 RRR without M/R/G.  Abdomen:  Abdomen is soft NT/ND. +BS. No masses noted, no splenomegaly or hepatomegaly.  Extremities:  Full range of motion. No gross deformities noted. 2+ pulses in all extremities.   Neuro:  Muscle tone is normal. Cranial nerves II-XII grossly intact.         LAB TESTS:   Recent Labs     24  2155 24  1142   WBC 15.6* 15.7*   RBC 2.59* 2.45*   HEMOGLOBIN 8.4* 8.0*   HEMATOCRIT 23.9* 22.9*   MCV 92.3 93.5   MCH 32.4 32.7   RDW 70.4* 70.0*    PLATELETCT 358 364   MPV 9.1 9.7   NEUTSPOLYS 70.20 68.70   LYMPHOCYTES 18.40* 18.40*   MONOCYTES 9.60 11.30   EOSINOPHILS 0.90 0.30   BASOPHILS 0.00 0.30   RBCMORPHOLO Present  --          Recent Labs     11/22/24  2155 11/23/24  1142   SODIUM 136  --    POTASSIUM 4.4  --    CHLORIDE 104  --    CO2 21  --    BUN 7*  --    CREATININE 0.65  --    CALCIUM 9.3  --    MAGNESIUM  --  1.8   ALBUMIN 4.6  --        CULTURES:   Results       Procedure Component Value Units Date/Time    URINALYSIS (UA) [[id number]] Collected: 11/23/24 1237    Order Status: Completed Specimen: Urine Updated: 11/23/24 1303     Color Yellow     Character Clear     Specific Gravity 1.005     Ph 6.0     Glucose Negative mg/dL      Ketones Negative mg/dL      Protein Negative mg/dL      Bilirubin Negative     Urobilinogen, Urine 1.0 EU/dL      Nitrite Negative     Leukocyte Esterase Negative     Occult Blood Negative     Micro Urine Req see below     Comment: Microscopic examination not performed when specimen is clear  and chemically negative for protein, blood, leukocyte esterase  and nitrite.                 IMAGING:  DX-CHEST-PORTABLE (1 VIEW)   Final Result      No evidence of acute cardiopulmonary process.          CONSULTS:   None    ASSESSMENT/PLAN: 24 y.o. female with history of sickle cell anemia, admitted for sickle cell pain crisis.    * Acute sickle cell crisis (HCC)- (present on admission)  Assessment & Plan  Acute, moderate with pain limited to limbs and stable Hgb at 8.0.  No evidence at this time of splenic sequestration and physical exam was very reassuring.  Patient actively treated with hydroxyurea 1500 mg daily with reported medication adherence.    Plan:  - Admit for pain management  - Continue hydroxyurea  - Trend Hgb, transfuse for Hgb less than 7  - Monitor for any signs of infection, low threshold for starting antibiotics          Core Measures:  Fluids: P.o.  Lines: PIV  Abx: None  Diet: Regular  PPX: SCDs/TEDs,  Lovenox  Wound care: No wounds    CODE STATUS: Full code      I anticipate the patient:  (1) will require at least two midnights for appropriate medical management, necessitating inpatient admission because patient will need close monitoring and pain management.  Condition guarded        Celsa Kimble MD  [facility]

## 2024-11-23 NOTE — ED PROVIDER NOTES
[facility] Provider Note    CHIEF COMPLAINT  Chief Complaint   Patient presents with    Pain       EXTERNAL RECORDS REVIEWED  Patient was admitted to the hospital in 2023 for sickle cell crisis.    HPI/ROS  LIMITATION TO HISTORY   None  OUTSIDE HISTORIAN(S):  None    Henry Mónica Anand is a 24 y.o. female who presents with acute sickle cell pain.  Patient says her symptoms started around 830 this morning.  She says that she does have a hematologist in [place] who was previously prescribed her Percocet but she did not have any left to take.  She says she has been trying to manage with Tylenol and ibuprofen throughout the day but it has not been helping.  She says that she has pain mostly in her arms and her legs.  She says that she feels pain even in her veins.  She had chest pain earlier but not currently.  She does not have any shortness of breath, cough, fevers or any recent infectious symptoms.  She has no abdominal pain.  No back pain. Patient says that she thinks this episode was likely triggered by her starting her menstrual period as this has happened in the past.  She does state that her pain is consistent with prior pain crises.     PAST MEDICAL HISTORY   has a past medical history of Asthma (2021), Gallstones with biliary obstruction (11/2014), Heart murmur, and Sickle cell anemia without crisis (HCC) (2001).    SURGICAL HISTORY   has a past surgical history that includes cholecystectomy (11/3/2014); ercp (N/A, 10/30/2014); other (2018); septoturbinoplasty (Bilateral, 5/4/2021); antrostomy (5/4/2021); and ethmoidectomy, endoscopic (5/4/2021).    FAMILY HISTORY  Family History   Problem Relation Age of Onset    Stroke Sister     Anemia Sister         Sickle cell    Sleep Apnea Mother     Sleep Apnea Father     Sleep Apnea Brother        SOCIAL HISTORY  Social History     Tobacco Use    Smoking status: Never    Smokeless tobacco: Never   Vaping Use    Vaping status: Never Used   Substance and  "Sexual Activity    Alcohol use: Not Currently    Drug use: Not Currently     Types: Inhaled     Comment: job , 2 joints    Sexual activity: Not on file       CURRENT MEDICATIONS  Home Medications       Reviewed by Valery Schmitz R.N. (Registered Nurse) on 11/22/24 at 2130  Med List Status: Not Addressed     Medication Last Dose Status   Cholecalciferol (VITAMIN D3) 125 MCG (5000 UT) Cap  Active   fluticasone (FLOVENT HFA) 44 MCG/ACT Aerosol  Active   folic acid (FOLVITE) 1 MG Tab  Active   hydroxyurea (HYDREA) 500 MG Cap  Active   ibuprofen (MOTRIN) 600 MG Tab  Active   penicillin v potassium (VEETID) 250 MG Tab  Active   VENTOLIN  (90 Base) MCG/ACT Aero Soln inhalation aerosol  Active                    ALLERGIES  Allergies   Allergen Reactions    Haloperidol Unspecified     Received Haldol x 2 doses in [facility] and developed an acute dystonic reaction, treated with diphenhydramine       PHYSICAL EXAM  VITAL SIGNS: /78   Pulse 70   Temp 36.5 °C (97.7 °F)   Resp 16   Ht 1.575 m (5' 2\")   Wt 58.1 kg (128 lb)   SpO2 95%   BMI 23.41 kg/m²    Constitutional: Awake and alert. Non toxic  HENT: Normal inspection  Dry mucous membranes  Eyes: Normal inspection  Neck: Grossly normal range of motion.  Cardiovascular: Tachycardic heart rate, Normal rhythm.  Symmetric peripheral pulses.   Thorax & Lungs: No respiratory distress, No wheezing, No rales, No rhonchi, No chest tenderness.   Abdomen: Soft, non-distended, nontender to palpation in all 4 quadrants, no mass  Skin: No obvious rash.  Extremities: Warm, well perfused. No clubbing, cyanosis, edema   Neurologic: Grossly normal   Psychiatric: Normal for situation      EKG/LABS  Results for orders placed or performed during the hospital encounter of 11/22/24   CBC WITH DIFFERENTIAL    Collection Time: 11/22/24  9:55 PM   Result Value Ref Range    WBC 15.6 (H) 4.8 - 10.8 K/uL    RBC 2.59 (L) 4.20 - 5.40 M/uL    Hemoglobin 8.4 (L) 12.0 - 16.0 " g/dL    Hematocrit 23.9 (L) 37.0 - 47.0 %    MCV 92.3 81.4 - 97.8 fL    MCH 32.4 27.0 - 33.0 pg    MCHC 35.1 32.2 - 35.5 g/dL    RDW 70.4 (H) 35.9 - 50.0 fL    Platelet Count 358 164 - 446 K/uL    MPV 9.1 9.0 - 12.9 fL    Neutrophils-Polys 70.20 44.00 - 72.00 %    Lymphocytes 18.40 (L) 22.00 - 41.00 %    Monocytes 9.60 0.00 - 13.40 %    Eosinophils 0.90 0.00 - 6.90 %    Basophils 0.00 0.00 - 1.80 %    Nucleated RBC 5.50 (H) 0.00 - 0.20 /100 WBC    Neutrophils (Absolute) 10.95 (H) 1.82 - 7.42 K/uL    Lymphs (Absolute) 2.87 1.00 - 4.80 K/uL    Monos (Absolute) 1.50 (H) 0.00 - 0.85 K/uL    Eos (Absolute) 0.14 0.00 - 0.51 K/uL    Baso (Absolute) 0.00 0.00 - 0.12 K/uL    NRBC (Absolute) 0.85 K/uL    Hypochromia 1+     Anisocytosis 1+     Macrocytosis 1+    COMP METABOLIC PANEL    Collection Time: 11/22/24  9:55 PM   Result Value Ref Range    Sodium 136 135 - 145 mmol/L    Potassium 4.4 3.6 - 5.5 mmol/L    Chloride 104 96 - 112 mmol/L    Co2 21 20 - 33 mmol/L    Anion Gap 11.0 7.0 - 16.0    Glucose 104 (H) 65 - 99 mg/dL    Bun 7 (L) 8 - 22 mg/dL    Creatinine 0.65 0.50 - 1.40 mg/dL    Calcium 9.3 8.5 - 10.5 mg/dL    Correct Calcium 8.8 8.5 - 10.5 mg/dL    AST(SGOT) 39 12 - 45 U/L    ALT(SGPT) 19 2 - 50 U/L    Alkaline Phosphatase 81 30 - 99 U/L    Total Bilirubin 1.6 (H) 0.1 - 1.5 mg/dL    Albumin 4.6 3.2 - 4.9 g/dL    Total Protein 8.1 6.0 - 8.2 g/dL    Globulin 3.5 1.9 - 3.5 g/dL    A-G Ratio 1.3 g/dL   RETICULOCYTES COUNT    Collection Time: 11/22/24  9:55 PM   Result Value Ref Range    Reticulocyte Count 20.4 (H) 0.8 - 2.6 %    Retic, Absolute 0.51 (H) 0.04 - 0.12 M/uL    Imm. Reticulocyte Fraction 28.5 (H) 2.6 - 16.1 %    Retic Hgb Equivalent 30.9 29.0 - 35.0 pg/cell   ESTIMATED GFR    Collection Time: 11/22/24  9:55 PM   Result Value Ref Range    GFR (CKD-EPI) 127 >60 mL/min/1.73 m 2   DIFFERENTIAL MANUAL    Collection Time: 11/22/24  9:55 PM   Result Value Ref Range    Metamyelocytes 0.90 %    Manual Diff Status  PERFORMED    PERIPHERAL SMEAR REVIEW    Collection Time: 11/22/24  9:55 PM   Result Value Ref Range    Peripheral Smear Review see below    PLATELET ESTIMATE    Collection Time: 11/22/24  9:55 PM   Result Value Ref Range    Plt Estimation Normal    MORPHOLOGY    Collection Time: 11/22/24  9:55 PM   Result Value Ref Range    RBC Morphology Present     Polychromia 2+     Poikilocytosis 2+     Target Cells 1+     Sickle Cells 2+        I have independently interpreted this EKG    RADIOLOGY/PROCEDURES   I have independently interpreted the diagnostic imaging associated with this visit and am waiting the final reading from the radiologist.   My preliminary interpretation is as follows:  No infiltrate    Radiologist interpretation:  DX-CHEST-PORTABLE (1 VIEW)   Final Result         1. No acute cardiopulmonary abnormalities are identified.          COURSE & MEDICAL DECISION MAKING    ASSESSMENT, COURSE AND PLAN  Care Narrative: 24 y.o. femalewith history of sickle cell disease presents with sickle cell pain crisis. The patient is hemodynamically stable on arrival, and afebrile.  Her exam is reassuring, she is nontoxic-appearing, no hypoxia without evidence of acute chest syndrome, no organomegaly, no signs of infection. I have low concern for aplastic crisis, avascular necrosis, osteomyelitis, splenic sequestration or other concerning acute presentations of sickle cell disease. CXR unremarkable and lab work notable for anemia with increased reticulocyte count consistent with pain crisis. She does have a leukocytosis but I suspect this is due to stress response in setting of pain crisis, as above she has no infectious symptoms.  I considered alternate etiologies of this patient’s pain to include fracture, MSK pain, infection/abscess, and other ischemic etiologies but these seem unlikely.     The patient was treated with multiple doses of IV dilaudid, IV fluids and IV benadryl. On reevaluation she feels improved and prefers  to discharge home.  She says that her pain is well controlled and feels like she can manage on oral medications.     She will return to the ER if she feels that her pain is not well controlled at home. She has been ambulating and tolerating PO without difficulty.  Strict and immediate return precautions were given and the patient was in agreement.     Narcotics Script: In prescribing controlled substances to this patient, I certify that I have obtained and reviewed the medical history of Henry Anand. I have also made a good siomara effort to obtain applicable records from other providers who have treated the patient and records did not demonstrate any increased risk of substance abuse that would prevent me from prescribing controlled substances.     I have conducted a physical exam and documented it. I have reviewed Ms. Anand’s prescription history as maintained by the [place] Prescription Monitoring Program.     I have assessed the patient’s risk for abuse, dependency, and addiction using the validated Opioid Risk Tool available at https://www.mdcalc.com/mtdxtg-ruoa-akql-ort-narcotic-abuse.     Given the above, I believe the benefits of controlled substance therapy outweigh the risks. The reasons for prescribing controlled substances include non-narcotic, oral analgesic alternatives have been inadequate for pain control. Accordingly, I have discussed the risk and benefits, treatment plan, and alternative therapies with the patient.        DISPOSITION AND DISCUSSIONS  I have discussed management of the patient with the following physicians and GABE's:  None    Discussion of management with other QHP or appropriate source(s): None     Escalation of care considered, and ultimately not performed:after discussion with the patient / family, they have elected to decline an escalation in care    Barriers to care at this time, including but not limited to:  None .     Decision tools and prescription drugs  considered including, but not limited to: Pain Medications See above .    FINAL DIAGNOSIS  1. Sickle cell crisis (HCC) Acute        Electronically signed by: Dulce Maria Duenas M.D., 11/22/2024 9:46 PM

## 2024-11-23 NOTE — ED NOTES
Mom came back from pharmacy    Discharged in stable condition, alert and oriented, ambulatory. Prescribed medication and follow up appointment instructed. Health education imparted. Instructed to come back once symptoms worsened. Pt verbalized understanding of the information given. Removed IV line and applied pressure.

## 2024-11-23 NOTE — ED TRIAGE NOTES
"Chief Complaint   Patient presents with    Generalized Body Aches     Bib ems from home. Pt complaining of generalized body aches lasting from 0830 yesterday. Pt was seen here last night for the same reason. Pt has a history sickle cell anemia and believes it is na \"flair up.\"     Pt currently complaining of 10/10 generalized pain    Vitals:    11/23/24 1137   BP: 117/58   Pulse: 72   Resp: (!) 22   Temp: 37.3 °C (99.2 °F)   SpO2: 90%       "

## 2024-11-23 NOTE — ED NOTES
Medication history reviewed with PT at bedside    [facility] is complete per PT reporting    Allergies reviewed.     PT is on Penicillin 250mg daily for infection prevention secondary to Sickle Cell. Last dose was 11/24/2024 in the PM.    Patient is not taking anticoagulants.    PT reports that she has not taken her Vit D3 5000UT, her Folic Acid 1MG or her Hydroxyurea 150mg for a week because she ran out of these medications and she does not have a PCP here. Her PCP is located in [facility]. PT requested refills at discharge of these medications if possible please.    Preferred pharmacy for this visit - [facility] on [address] ([phone])

## 2024-11-23 NOTE — ED TRIAGE NOTES
Henry Lyil Cheng  24 y.o. female    Chief Complaint   Patient presents with    Pain     Pt states hx of sickle cell anemia, woke up this morning with all over body pain, mostly in extremities. States took tylenol and ibuprofen and has not had any relief.     Vitals:    11/22/24 2125   BP: (!) 140/94   Pulse: (!) 115   Resp: (!) 23   Temp: 37 °C (98.6 °F)   SpO2: 95%       Triage process explained to patient, apologized for wait time, and returned to lobby.  Pt informed to notify staff of any change in condition.

## 2024-11-24 ENCOUNTER — APPOINTMENT (OUTPATIENT)
Dept: RADIOLOGY | Facility: MEDICAL CENTER | Age: 24
DRG: 811 | End: 2024-11-24
Attending: EMERGENCY MEDICINE
Payer: COMMERCIAL

## 2024-11-24 ENCOUNTER — APPOINTMENT (OUTPATIENT)
Dept: RADIOLOGY | Facility: MEDICAL CENTER | Age: 24
DRG: 811 | End: 2024-11-24
Attending: STUDENT IN AN ORGANIZED HEALTH CARE EDUCATION/TRAINING PROGRAM
Payer: COMMERCIAL

## 2024-11-24 ENCOUNTER — HOSPITAL ENCOUNTER (INPATIENT)
Facility: MEDICAL CENTER | Age: 24
LOS: 1 days | DRG: 811 | End: 2024-11-25
Attending: EMERGENCY MEDICINE | Admitting: STUDENT IN AN ORGANIZED HEALTH CARE EDUCATION/TRAINING PROGRAM
Payer: COMMERCIAL

## 2024-11-24 ENCOUNTER — APPOINTMENT (OUTPATIENT)
Dept: RADIOLOGY | Facility: MEDICAL CENTER | Age: 24
DRG: 811 | End: 2024-11-24
Payer: COMMERCIAL

## 2024-11-24 VITALS
TEMPERATURE: 100.3 F | HEIGHT: 62 IN | RESPIRATION RATE: 18 BRPM | DIASTOLIC BLOOD PRESSURE: 85 MMHG | BODY MASS INDEX: 22.11 KG/M2 | SYSTOLIC BLOOD PRESSURE: 142 MMHG | HEART RATE: 94 BPM | WEIGHT: 120.15 LBS | OXYGEN SATURATION: 99 %

## 2024-11-24 DIAGNOSIS — D57.1 SICKLE CELL ANEMIA WITHOUT CRISIS (HCC): Chronic | ICD-10-CM

## 2024-11-24 DIAGNOSIS — R50.9 FEVER, UNSPECIFIED FEVER CAUSE: ICD-10-CM

## 2024-11-24 DIAGNOSIS — D57.00: ICD-10-CM

## 2024-11-24 LAB
ALBUMIN SERPL BCP-MCNC: 4.2 G/DL (ref 3.2–4.9)
ALBUMIN/GLOB SERPL: 1.3 G/DL
ALP SERPL-CCNC: 94 U/L (ref 30–99)
ALT SERPL-CCNC: 32 U/L (ref 2–50)
ANION GAP SERPL CALC-SCNC: 10 MMOL/L (ref 7–16)
ANISOCYTOSIS BLD QL SMEAR: ABNORMAL
ANISOCYTOSIS BLD QL SMEAR: ABNORMAL
AST SERPL-CCNC: 52 U/L (ref 12–45)
BASOPHILS # BLD AUTO: 0 % (ref 0–1.8)
BASOPHILS # BLD AUTO: 0.3 % (ref 0–1.8)
BASOPHILS # BLD: 0 K/UL (ref 0–0.12)
BASOPHILS # BLD: 0.04 K/UL (ref 0–0.12)
BILIRUB SERPL-MCNC: 2 MG/DL (ref 0.1–1.5)
BUN SERPL-MCNC: 4 MG/DL (ref 8–22)
CALCIUM ALBUM COR SERPL-MCNC: 8.7 MG/DL (ref 8.5–10.5)
CALCIUM SERPL-MCNC: 8.9 MG/DL (ref 8.5–10.5)
CHLORIDE SERPL-SCNC: 102 MMOL/L (ref 96–112)
CO2 SERPL-SCNC: 23 MMOL/L (ref 20–33)
COMMENT 1642: NORMAL
COMMENT NL1176: NORMAL
CREAT SERPL-MCNC: 0.53 MG/DL (ref 0.5–1.4)
EOSINOPHIL # BLD AUTO: 0.15 K/UL (ref 0–0.51)
EOSINOPHIL # BLD AUTO: 0.15 K/UL (ref 0–0.51)
EOSINOPHIL NFR BLD: 0.9 % (ref 0–6.9)
EOSINOPHIL NFR BLD: 1.1 % (ref 0–6.9)
ERYTHROCYTE [DISTWIDTH] IN BLOOD BY AUTOMATED COUNT: 72 FL (ref 35.9–50)
ERYTHROCYTE [DISTWIDTH] IN BLOOD BY AUTOMATED COUNT: 75 FL (ref 35.9–50)
GFR SERPLBLD CREATININE-BSD FMLA CKD-EPI: 133 ML/MIN/1.73 M 2
GLOBULIN SER CALC-MCNC: 3.3 G/DL (ref 1.9–3.5)
GLUCOSE SERPL-MCNC: 110 MG/DL (ref 65–99)
HCT VFR BLD AUTO: 20.9 % (ref 37–47)
HCT VFR BLD AUTO: 21.6 % (ref 37–47)
HCT VFR BLD AUTO: 21.8 % (ref 37–47)
HGB BLD-MCNC: 7.7 G/DL (ref 12–16)
HGB BLD-MCNC: 7.7 G/DL (ref 12–16)
HGB BLD-MCNC: 8 G/DL (ref 12–16)
HYPOCHROMIA BLD QL SMEAR: ABNORMAL
IMM GRANULOCYTES # BLD AUTO: 0.09 K/UL (ref 0–0.11)
IMM GRANULOCYTES NFR BLD AUTO: 0.6 % (ref 0–0.9)
IRON SATN MFR SERPL: 25 % (ref 15–55)
IRON SERPL-MCNC: 51 UG/DL (ref 40–170)
LYMPHOCYTES # BLD AUTO: 4.23 K/UL (ref 1–4.8)
LYMPHOCYTES # BLD AUTO: 5.13 K/UL (ref 1–4.8)
LYMPHOCYTES NFR BLD: 26.1 % (ref 22–41)
LYMPHOCYTES NFR BLD: 36.5 % (ref 22–41)
MACROCYTES BLD QL SMEAR: ABNORMAL
MACROCYTES BLD QL SMEAR: ABNORMAL
MANUAL DIFF BLD: NORMAL
MCH RBC QN AUTO: 32.2 PG (ref 27–33)
MCH RBC QN AUTO: 33.5 PG (ref 27–33)
MCHC RBC AUTO-ENTMCNC: 35.3 G/DL (ref 32.2–35.5)
MCHC RBC AUTO-ENTMCNC: 37 G/DL (ref 32.2–35.5)
MCV RBC AUTO: 90.4 FL (ref 81.4–97.8)
MCV RBC AUTO: 91.2 FL (ref 81.4–97.8)
MONOCYTES # BLD AUTO: 1.41 K/UL (ref 0–0.85)
MONOCYTES # BLD AUTO: 2.25 K/UL (ref 0–0.85)
MONOCYTES NFR BLD AUTO: 10 % (ref 0–13.4)
MONOCYTES NFR BLD AUTO: 13.9 % (ref 0–13.4)
MORPHOLOGY BLD-IMP: NORMAL
MORPHOLOGY BLD-IMP: NORMAL
NEUTROPHILS # BLD AUTO: 7.25 K/UL (ref 1.82–7.42)
NEUTROPHILS # BLD AUTO: 9.57 K/UL (ref 1.82–7.42)
NEUTROPHILS NFR BLD: 51.5 % (ref 44–72)
NEUTROPHILS NFR BLD: 59.1 % (ref 44–72)
NRBC # BLD AUTO: 1.5 K/UL
NRBC # BLD AUTO: 1.5 K/UL
NRBC BLD-RTO: 10.7 /100 WBC (ref 0–0.2)
NRBC BLD-RTO: 9.3 /100 WBC (ref 0–0.2)
PLATELET # BLD AUTO: 309 K/UL (ref 164–446)
PLATELET # BLD AUTO: 315 K/UL (ref 164–446)
PLATELET BLD QL SMEAR: NORMAL
PLATELET BLD QL SMEAR: NORMAL
PMV BLD AUTO: 9.1 FL (ref 9–12.9)
PMV BLD AUTO: 9.3 FL (ref 9–12.9)
POIKILOCYTOSIS BLD QL SMEAR: NORMAL
POIKILOCYTOSIS BLD QL SMEAR: NORMAL
POLYCHROMASIA BLD QL SMEAR: NORMAL
POLYCHROMASIA BLD QL SMEAR: NORMAL
POTASSIUM SERPL-SCNC: 4 MMOL/L (ref 3.6–5.5)
PROT SERPL-MCNC: 7.5 G/DL (ref 6–8.2)
RBC # BLD AUTO: 2.39 M/UL (ref 4.2–5.4)
RBC # BLD AUTO: 2.39 M/UL (ref 4.2–5.4)
RBC BLD AUTO: PRESENT
RBC BLD AUTO: PRESENT
SCHISTOCYTES BLD QL SMEAR: NORMAL
SCHISTOCYTES BLD QL SMEAR: NORMAL
SICKLE CELLS BLD QL SMEAR: NORMAL
SICKLE CELLS BLD QL SMEAR: NORMAL
SODIUM SERPL-SCNC: 135 MMOL/L (ref 135–145)
STOMATOCYTES BLD QL SMEAR: NORMAL
TARGETS BLD QL SMEAR: NORMAL
TARGETS BLD QL SMEAR: NORMAL
TIBC SERPL-MCNC: 204 UG/DL (ref 250–450)
UIBC SERPL-MCNC: 153 UG/DL (ref 110–370)
WBC # BLD AUTO: 14.1 K/UL (ref 4.8–10.8)
WBC # BLD AUTO: 16.2 K/UL (ref 4.8–10.8)

## 2024-11-24 PROCEDURE — 85014 HEMATOCRIT: CPT

## 2024-11-24 PROCEDURE — 700105 HCHG RX REV CODE 258: Performed by: STUDENT IN AN ORGANIZED HEALTH CARE EDUCATION/TRAINING PROGRAM

## 2024-11-24 PROCEDURE — 96375 TX/PRO/DX INJ NEW DRUG ADDON: CPT

## 2024-11-24 PROCEDURE — A9270 NON-COVERED ITEM OR SERVICE: HCPCS

## 2024-11-24 PROCEDURE — 36415 COLL VENOUS BLD VENIPUNCTURE: CPT

## 2024-11-24 PROCEDURE — 700111 HCHG RX REV CODE 636 W/ 250 OVERRIDE (IP): Mod: JZ

## 2024-11-24 PROCEDURE — 700111 HCHG RX REV CODE 636 W/ 250 OVERRIDE (IP)

## 2024-11-24 PROCEDURE — 85018 HEMOGLOBIN: CPT

## 2024-11-24 PROCEDURE — 700105 HCHG RX REV CODE 258

## 2024-11-24 PROCEDURE — 96374 THER/PROPH/DIAG INJ IV PUSH: CPT

## 2024-11-24 PROCEDURE — 96376 TX/PRO/DX INJ SAME DRUG ADON: CPT

## 2024-11-24 PROCEDURE — 700102 HCHG RX REV CODE 250 W/ 637 OVERRIDE(OP): Performed by: STUDENT IN AN ORGANIZED HEALTH CARE EDUCATION/TRAINING PROGRAM

## 2024-11-24 PROCEDURE — 700102 HCHG RX REV CODE 250 W/ 637 OVERRIDE(OP)

## 2024-11-24 PROCEDURE — 99223 1ST HOSP IP/OBS HIGH 75: CPT | Performed by: STUDENT IN AN ORGANIZED HEALTH CARE EDUCATION/TRAINING PROGRAM

## 2024-11-24 PROCEDURE — 700111 HCHG RX REV CODE 636 W/ 250 OVERRIDE (IP): Mod: UD | Performed by: EMERGENCY MEDICINE

## 2024-11-24 PROCEDURE — 700111 HCHG RX REV CODE 636 W/ 250 OVERRIDE (IP): Mod: JZ | Performed by: STUDENT IN AN ORGANIZED HEALTH CARE EDUCATION/TRAINING PROGRAM

## 2024-11-24 PROCEDURE — 770020 HCHG ROOM/CARE - TELE (206)

## 2024-11-24 PROCEDURE — 85027 COMPLETE CBC AUTOMATED: CPT

## 2024-11-24 PROCEDURE — 85007 BL SMEAR W/DIFF WBC COUNT: CPT

## 2024-11-24 PROCEDURE — 87641 MR-STAPH DNA AMP PROBE: CPT

## 2024-11-24 PROCEDURE — A9270 NON-COVERED ITEM OR SERVICE: HCPCS | Performed by: STUDENT IN AN ORGANIZED HEALTH CARE EDUCATION/TRAINING PROGRAM

## 2024-11-24 PROCEDURE — 302131 K PAD MOTOR: Performed by: STUDENT IN AN ORGANIZED HEALTH CARE EDUCATION/TRAINING PROGRAM

## 2024-11-24 PROCEDURE — 83550 IRON BINDING TEST: CPT

## 2024-11-24 PROCEDURE — 87040 BLOOD CULTURE FOR BACTERIA: CPT

## 2024-11-24 PROCEDURE — 80053 COMPREHEN METABOLIC PANEL: CPT

## 2024-11-24 PROCEDURE — 99285 EMERGENCY DEPT VISIT HI MDM: CPT

## 2024-11-24 PROCEDURE — 83540 ASSAY OF IRON: CPT

## 2024-11-24 PROCEDURE — 0202U NFCT DS 22 TRGT SARS-COV-2: CPT

## 2024-11-24 PROCEDURE — 302151 K-PAD 14X20: Performed by: STUDENT IN AN ORGANIZED HEALTH CARE EDUCATION/TRAINING PROGRAM

## 2024-11-24 PROCEDURE — 71045 X-RAY EXAM CHEST 1 VIEW: CPT

## 2024-11-24 RX ORDER — ACETAMINOPHEN 500 MG
1000 TABLET ORAL EVERY 6 HOURS PRN
Status: DISCONTINUED | OUTPATIENT
Start: 2024-11-29 | End: 2024-11-25 | Stop reason: HOSPADM

## 2024-11-24 RX ORDER — HYDROMORPHONE HYDROCHLORIDE 1 MG/ML
1 INJECTION, SOLUTION INTRAMUSCULAR; INTRAVENOUS; SUBCUTANEOUS
Status: COMPLETED | OUTPATIENT
Start: 2024-11-24 | End: 2024-11-24

## 2024-11-24 RX ORDER — FLUTICASONE PROPIONATE 44 UG/1
2 AEROSOL, METERED RESPIRATORY (INHALATION) 2 TIMES DAILY PRN
Status: DISCONTINUED | OUTPATIENT
Start: 2024-11-24 | End: 2024-11-25 | Stop reason: HOSPADM

## 2024-11-24 RX ORDER — OXYCODONE AND ACETAMINOPHEN 5; 325 MG/1; MG/1
1 TABLET ORAL EVERY 6 HOURS PRN
Status: DISCONTINUED | OUTPATIENT
Start: 2024-11-24 | End: 2024-11-24

## 2024-11-24 RX ORDER — ENOXAPARIN SODIUM 100 MG/ML
40 INJECTION SUBCUTANEOUS DAILY
Status: DISCONTINUED | OUTPATIENT
Start: 2024-11-24 | End: 2024-11-25 | Stop reason: HOSPADM

## 2024-11-24 RX ORDER — IBUPROFEN 800 MG/1
800 TABLET, FILM COATED ORAL 3 TIMES DAILY PRN
Status: DISCONTINUED | OUTPATIENT
Start: 2024-11-29 | End: 2024-11-25 | Stop reason: HOSPADM

## 2024-11-24 RX ORDER — HYDROMORPHONE HYDROCHLORIDE 1 MG/ML
1 INJECTION, SOLUTION INTRAMUSCULAR; INTRAVENOUS; SUBCUTANEOUS ONCE
Status: COMPLETED | OUTPATIENT
Start: 2024-11-24 | End: 2024-11-24

## 2024-11-24 RX ORDER — HYDROMORPHONE HYDROCHLORIDE 1 MG/ML
1 INJECTION, SOLUTION INTRAMUSCULAR; INTRAVENOUS; SUBCUTANEOUS
Status: DISCONTINUED | OUTPATIENT
Start: 2024-11-24 | End: 2024-11-24

## 2024-11-24 RX ORDER — OXYCODONE HYDROCHLORIDE 10 MG/1
10 TABLET ORAL
Status: DISCONTINUED | OUTPATIENT
Start: 2024-11-24 | End: 2024-11-24

## 2024-11-24 RX ORDER — HYDROMORPHONE HYDROCHLORIDE 2 MG/ML
1.5 INJECTION, SOLUTION INTRAMUSCULAR; INTRAVENOUS; SUBCUTANEOUS
Status: DISCONTINUED | OUTPATIENT
Start: 2024-11-24 | End: 2024-11-24

## 2024-11-24 RX ORDER — SODIUM CHLORIDE 9 MG/ML
INJECTION, SOLUTION INTRAVENOUS CONTINUOUS
Status: DISCONTINUED | OUTPATIENT
Start: 2024-11-24 | End: 2024-11-25

## 2024-11-24 RX ORDER — SODIUM CHLORIDE 9 MG/ML
INJECTION, SOLUTION INTRAVENOUS CONTINUOUS
Status: DISCONTINUED | OUTPATIENT
Start: 2024-11-24 | End: 2024-11-24 | Stop reason: HOSPADM

## 2024-11-24 RX ORDER — IBUPROFEN 800 MG/1
800 TABLET, FILM COATED ORAL 3 TIMES DAILY
Status: DISCONTINUED | OUTPATIENT
Start: 2024-11-24 | End: 2024-11-25 | Stop reason: HOSPADM

## 2024-11-24 RX ORDER — HYDROMORPHONE HYDROCHLORIDE 1 MG/ML
0.5 INJECTION, SOLUTION INTRAMUSCULAR; INTRAVENOUS; SUBCUTANEOUS
Status: DISCONTINUED | OUTPATIENT
Start: 2024-11-24 | End: 2024-11-24

## 2024-11-24 RX ORDER — DIPHENHYDRAMINE HYDROCHLORIDE 50 MG/ML
25 INJECTION INTRAMUSCULAR; INTRAVENOUS ONCE
Status: COMPLETED | OUTPATIENT
Start: 2024-11-24 | End: 2024-11-24

## 2024-11-24 RX ORDER — HYDROPHOR 42 G/100G
OINTMENT TOPICAL 3 TIMES DAILY PRN
Status: DISCONTINUED | OUTPATIENT
Start: 2024-11-24 | End: 2024-11-25 | Stop reason: HOSPADM

## 2024-11-24 RX ORDER — LORATADINE 10 MG/1
10 TABLET ORAL
Status: DISCONTINUED | OUTPATIENT
Start: 2024-11-24 | End: 2024-11-25 | Stop reason: HOSPADM

## 2024-11-24 RX ORDER — HYDROXYUREA 500 MG/1
1500 CAPSULE ORAL DAILY
Status: DISCONTINUED | OUTPATIENT
Start: 2024-11-25 | End: 2024-11-25 | Stop reason: HOSPADM

## 2024-11-24 RX ORDER — ACETAMINOPHEN 500 MG
1000 TABLET ORAL EVERY 6 HOURS
Status: DISCONTINUED | OUTPATIENT
Start: 2024-11-24 | End: 2024-11-25 | Stop reason: HOSPADM

## 2024-11-24 RX ORDER — CYCLOBENZAPRINE HCL 10 MG
10 TABLET ORAL 3 TIMES DAILY PRN
Status: DISCONTINUED | OUTPATIENT
Start: 2024-11-24 | End: 2024-11-24 | Stop reason: HOSPADM

## 2024-11-24 RX ORDER — ALBUTEROL SULFATE 90 UG/1
2 INHALANT RESPIRATORY (INHALATION) EVERY 6 HOURS PRN
Status: DISCONTINUED | OUTPATIENT
Start: 2024-11-24 | End: 2024-11-25 | Stop reason: HOSPADM

## 2024-11-24 RX ORDER — DIPHENHYDRAMINE HCL 25 MG
25 TABLET ORAL ONCE
Status: COMPLETED | OUTPATIENT
Start: 2024-11-24 | End: 2024-11-24

## 2024-11-24 RX ORDER — FOLIC ACID 1 MG/1
1 TABLET ORAL DAILY
Status: DISCONTINUED | OUTPATIENT
Start: 2024-11-25 | End: 2024-11-25 | Stop reason: HOSPADM

## 2024-11-24 RX ADMIN — DIPHENHYDRAMINE HYDROCHLORIDE 25 MG: 25 TABLET ORAL at 22:55

## 2024-11-24 RX ADMIN — HYDROMORPHONE HYDROCHLORIDE 1 MG: 1 INJECTION, SOLUTION INTRAMUSCULAR; INTRAVENOUS; SUBCUTANEOUS at 00:31

## 2024-11-24 RX ADMIN — CHOLECALCIFEROL TAB 125 MCG (5000 UNIT) 5000 UNITS: 125 TAB at 18:54

## 2024-11-24 RX ADMIN — HYDROMORPHONE HYDROCHLORIDE 1 MG: 1 INJECTION, SOLUTION INTRAMUSCULAR; INTRAVENOUS; SUBCUTANEOUS at 17:10

## 2024-11-24 RX ADMIN — HYDROMORPHONE HYDROCHLORIDE 1 MG: 1 INJECTION, SOLUTION INTRAMUSCULAR; INTRAVENOUS; SUBCUTANEOUS at 13:41

## 2024-11-24 RX ADMIN — HYDROMORPHONE HYDROCHLORIDE 1 MG: 1 INJECTION, SOLUTION INTRAMUSCULAR; INTRAVENOUS; SUBCUTANEOUS at 21:10

## 2024-11-24 RX ADMIN — DIPHENHYDRAMINE HYDROCHLORIDE 25 MG: 50 INJECTION, SOLUTION INTRAMUSCULAR; INTRAVENOUS at 13:42

## 2024-11-24 RX ADMIN — HYDROMORPHONE HYDROCHLORIDE 1 MG: 1 INJECTION, SOLUTION INTRAMUSCULAR; INTRAVENOUS; SUBCUTANEOUS at 02:29

## 2024-11-24 RX ADMIN — HYDROMORPHONE HYDROCHLORIDE 1.5 MG: 2 INJECTION, SOLUTION INTRAMUSCULAR; INTRAVENOUS; SUBCUTANEOUS at 04:30

## 2024-11-24 RX ADMIN — HEPARIN SODIUM 5000 UNITS: 5000 INJECTION, SOLUTION INTRAVENOUS; SUBCUTANEOUS at 08:53

## 2024-11-24 RX ADMIN — ACETAMINOPHEN 1000 MG: 500 TABLET ORAL at 23:42

## 2024-11-24 RX ADMIN — PIPERACILLIN AND TAZOBACTAM 4.5 G: 4; .5 INJECTION, POWDER, FOR SOLUTION INTRAVENOUS at 21:23

## 2024-11-24 RX ADMIN — HYDROMORPHONE HYDROCHLORIDE 1.5 MG: 2 INJECTION, SOLUTION INTRAMUSCULAR; INTRAVENOUS; SUBCUTANEOUS at 06:41

## 2024-11-24 RX ADMIN — HYDROMORPHONE HYDROCHLORIDE 1.5 MG: 2 INJECTION, SOLUTION INTRAMUSCULAR; INTRAVENOUS; SUBCUTANEOUS at 08:46

## 2024-11-24 RX ADMIN — HYDROMORPHONE HYDROCHLORIDE: 10 INJECTION, SOLUTION INTRAMUSCULAR; INTRAVENOUS; SUBCUTANEOUS at 23:06

## 2024-11-24 RX ADMIN — FOLIC ACID 1 MG: 1 TABLET ORAL at 04:34

## 2024-11-24 RX ADMIN — Medication: at 23:30

## 2024-11-24 RX ADMIN — HYDROXYUREA 1500 MG: 500 CAPSULE ORAL at 04:33

## 2024-11-24 RX ADMIN — ACETAMINOPHEN 1000 MG: 500 TABLET ORAL at 18:54

## 2024-11-24 RX ADMIN — HYDROMORPHONE HYDROCHLORIDE: 10 INJECTION, SOLUTION INTRAMUSCULAR; INTRAVENOUS; SUBCUTANEOUS at 10:48

## 2024-11-24 RX ADMIN — IBUPROFEN 800 MG: 800 TABLET, FILM COATED ORAL at 18:55

## 2024-11-24 RX ADMIN — SODIUM CHLORIDE: 9 INJECTION, SOLUTION INTRAVENOUS at 18:54

## 2024-11-24 RX ADMIN — ENOXAPARIN SODIUM 40 MG: 100 INJECTION SUBCUTANEOUS at 18:54

## 2024-11-24 RX ADMIN — PIPERACILLIN AND TAZOBACTAM 4.5 G: 4; .5 INJECTION, POWDER, FOR SOLUTION INTRAVENOUS at 18:58

## 2024-11-24 RX ADMIN — HYDROMORPHONE HYDROCHLORIDE 1 MG: 1 INJECTION, SOLUTION INTRAMUSCULAR; INTRAVENOUS; SUBCUTANEOUS at 19:09

## 2024-11-24 RX ADMIN — HYDROMORPHONE HYDROCHLORIDE 1 MG: 1 INJECTION, SOLUTION INTRAMUSCULAR; INTRAVENOUS; SUBCUTANEOUS at 14:14

## 2024-11-24 ASSESSMENT — PAIN DESCRIPTION - PAIN TYPE
TYPE: ACUTE PAIN
TYPE: CHRONIC PAIN
TYPE: ACUTE PAIN
TYPE: ACUTE PAIN
TYPE: CHRONIC PAIN
TYPE: ACUTE PAIN
TYPE: CHRONIC PAIN
TYPE: ACUTE PAIN

## 2024-11-24 ASSESSMENT — ENCOUNTER SYMPTOMS
NERVOUS/ANXIOUS: 1
FEVER: 1
BACK PAIN: 1

## 2024-11-24 ASSESSMENT — FIBROSIS 4 INDEX: FIB4 SCORE: 0.67

## 2024-11-24 NOTE — DISCHARGE PLANNING
Medical Social Work     VANITA received a call from the charge RN advising VANITA that the patient is requesting SW. VANITA and the STANLEY met with the pt and her mom at bedside. The patient advised SW that she is in pain and not feeling well. The patient has not had a good stay. The patient feels she has not had quality care. The patient also has complaints of the food services, and also not having her labs done in a timely manner. The patient also talked about how her pain has not been managed appropriately but in the ER it was managed. The doctor was at bedside with the patient before social work met with the pt and talked with her about a plan to manager the patients pain. During this meeting the patient had been medicated and would fall asleep and here and there. The patient explained she has had bad experiences at hospitals and has a hard time trusting hospitals. VANITA and the NAM advised the patient that we would address the  issue and the lab issue. The charge RN is aware and is going to make sure the patient gets appropriate /meals. Lab was at the charge station and he advised The NAM and VANITA that he did go to draw the pt labs at 4am and she refused due to being tired and wanting to sleep. We asked lab to check in with the pt RN at 7am to try to draw labs. The patient did advised VANITA and the STANLEY during our meeting that she did refuse the labs at 4am. VANITA and the STANLEY encouraged the pt to escalate any on going issues she may have during this stay.     Plan: VANITA will remain available for pt support.

## 2024-11-24 NOTE — HOSPITAL COURSE
This is a 24-year-old female with past medical history of sickle cell disease, patient has admissions where she continues to refuse medical care and management as per MD, refusing to wear her oxygen despite being hypoxic, refusing to stay hospitalized if she does not receive morphine or Dilaudid PCA who was recently admitted 11/23-11/24 for sickle cell crisis however she was discharged due to refusing all medical care, refusing to wear oxygen, she fired [employer] family requesting for different physician.    She returns back to the ER today due to continued pain however she is refusing labs, refusing to wear oxygen.

## 2024-11-24 NOTE — ED NOTES
Pt medicated per mar, pt transported off unit with transport staff for admission. Report given, belongings sent with patient

## 2024-11-24 NOTE — PROGRESS NOTES
Communication with Marc Huggins to inquire if he can call this RN so RN can give update. States pt wants a new doctor and was upset at Ashley Arroyo MD, notified pt has DC orders and this RN waiting to start PCA pt oxygen 80% on RA, security is at bedside with this RN. States to this RN pt is being discharged for refusing care and being aggressive according to his attending. Clarified this RN should hold PCA and DC pt, states yes. This RN states I will remove IV and DC pt. CRN notified and security notified.

## 2024-11-24 NOTE — ED NOTES
Escorted pt to restroom, SBA. Returned to room with steady gait. Mother at bedside, call light within reach.

## 2024-11-24 NOTE — ED TRIAGE NOTES
"Chief Complaint   Patient presents with    Fatigue    Pain     Pt reports being d/c earlier today for sickle cell crisis, pt c/o 10/10 pain. Pt reports \"pain everywhere.\" Pt frustrated in triage, saying it was \"inhumane to be d/c in pain, for not seeing my results or receiving discharge paperwork.\" Per pt chart, Hgb was 7.7 yesterday and 8 earlier today. Pt refusing labs at this time.     Blood Pressure: 114/74, Pulse: (!) 110, Respiration: (!) 22, Temperature: (!) 38.1 °C (100.6 °F), Height: 157.5 cm (5' 2\"), Weight: 54.5 kg (120 lb 2.4 oz), BMI (Calculated): 21.98, BSA (Calculated): 1.5, Pulse Oximetry: 95 %, O2 (LPM): 2      "

## 2024-11-24 NOTE — ED NOTES
Medication Reconciliation    Medication reconciliation is complete per patient reporting. Patient denies taking any medications since discharge earlier this day.  - Allergies reviewed.  -  Patient on penicillin v potassium prior to previous admission. Last dose 11/22/2024 pm.  - No anticoagulants in the last 14 days.  - Patient's home pharmacy [facility] on [address] [phone] and [facility] on [address] [facility] [phone].    Milli Christianson, Pharmacy Intern

## 2024-11-24 NOTE — PROGRESS NOTES
"PCA pump started with cosign per orders but \"run\" was never hit on pump. Pt had removed oxygen while setting it up and oxygen saturation noted at 80% on RA. RN to request pt to place oxygen and provided education on dilaudid and respiratory depression. [employer] doctor at bedside and provided education for pt and review of POC with pt. Pt very upset and starts yelling at DR at bedside, demanding to be DC home and getting out of bed agitated. RN to call for security for pt safety, noting roommate is upset and worried about pt agitation. Pt Mom unable to calm pt and pt yelling at mom. Security to come to bedside and pt yelling at security stating no one will draw her labs when she requests them drawn at this exact time. Lab to come to bedside per lab routine, able to draw labs with security still at bedside. PCA pump will be started for pt when noted to be safe for pt. Also, noted DC orders placed by doctor per pt request. RN will clarify plan for pt with .   "

## 2024-11-24 NOTE — DISCHARGE SUMMARY
"[facility] Discharge Summary    Attending: Dr. Valery Arroyo  Senior Resident: Dr. Maral Bishop  Intern:  Dr. Marc Huggins    CHIEF COMPLAINT ON ADMISSION  Chief Complaint   Patient presents with    Generalized Body Aches     Bib ems from home. Pt complaining of generalized body aches lasting from 0830 yesterday. Pt was seen here last night for the same reason. Pt has a history sickle cell anemia and believes it is na \"flair up.\"       Reason for Admission  EMS , sickle cell pain crisis     Admission Date  11/23/2024    CODE STATUS  Prior    HPI & HOSPITAL COURSE  This is a 24 y.o. female with significant past medical history of sickle cell, who presented with acute onset of severe pain secondary to sickle cell crisis admitted for pain management; later discharged for continued refusal of recommended medical care    HPI: Patient reports her symptoms began night prior to admission consisting of severe pain in her upper and lower extremities with areas of focus being the knees and shins. She denies any abdominal pain, nausea, vomiting, fevers or chills. Patient states that she has had a similar presentation approximately 1 year ago. She is currently on hydroxyurea 1500 mg daily and denies skipping any doses. Patient had been evaluated in the ED in the early hours of the morning and was eventually discharged with adequate pain control however, her pain resumed and she presented back to the emergency room.   ER Course: In the ED, vital signs largely within normal limits.  Labs: CBC notable for WBC 15.7, Hgb 8, otherwise grossly unremarkable.  Lipase 11, magnesium 1.8.  Urinalysis normal.  Procalcitonin normal 0.14 and beta-hCG negative.  Imaging: Chest x-ray normal without evidence of acute cardiopulmonary process. Patient was treated with IV Dilaudid and given a 1 L NS bolus.  Initially on 10mg Oxy q3h prn, 1mg Dilaudid q3h prn, and 15mg Toradol q6h prn. Multiple visits by night resident and RN staff for " patient's escalated pain requirements. Patient alert, pacing in room. States that she has continued to have severely elevated pain and is requesting for more frequent or higher dose pain medication. Discussed with pharmacy on call. Presented patient with options for either adding on scheduled oxycodone and increasing dosage of IV dilaudid to 1.5mg q2h or transitioning to Dilaudid PCA. Revisited discussion with patient regarding purpose of PCA and providing continuous basal dosage of dilaudid with its use. Also discussed with patient short acting nature of IV dilaudid. Patient declines PCA as well as scheduled PO oxycodone at this time. Increased dosage of IV dilaudid to 1.5mg q2h. Also refusing use of IV pump for maintenance IV fluids. Discussed with charge RN who consulted on policies on the floor, states we are unable to provide these IV fluids without IV pump for maintaining consistent rate. Patient declines IV fluids unless they can be set to gravity without pump.   Reviewed charts from last hospital admission, appears that patient previously was on dilaudid PCA pump during that admission with improvement in pain control. Reviewed previous admission PCA pump settings and opioid medication dosages with pharmacist on call who agreed with starting dilaudid PCA pump during this admission with basal rate settings of 0.4mg/hr, patient bolus dose of 0.5mg, bolus lockout interval of 30min, and four hour dose limit of 5mg. These were settings initiated during patient's last admission for sickle cell pain crisis approximately 1 year ago.   In morning, patient angry about not getting labs frequently (apparently refused 4am labs) and demanded to speak with SW. Again wanting escalated pain regimen again, still refusing PCA at first. Intermittently falling asleep during interview. Later in morning after initial interview, notified by RN that she is interested in PCA Dilaudid pump now as well as low rate IVF.   Later seen by  attending. Patient was about to start PCA, but was hypoxemic to 80% so was recommended to start O2 supplementation. Patient refused. Attending discussed all of the care that patient has been refusing since admission, and patient became very loud, aggressive, and threatening to provider and other staff. Provider informed patient that security would be called and she would be discharged for declining the recommended care. Patient never started IVF or PCA. Medically cleared for discharge by primary medical team.     Therefore, she is discharged in fair and stable condition to home with close outpatient follow-up.  Patient discharged for continued refusing recommended medical care.     Discharge Date  11/24/24    Physical Exam on Day of Discharge  General: Upset and angry, talks over interviewer. Intermittently falls asleep during interview. Otherwise well-appearing, no acute distress  Skin:  Pink, warm and dry.  No rashes or bruising  HEENT: NC/AT. PERRL. EOMI. MMM. No nasal discharge. Oropharynx nonerythematous without exudate/plaques  Lungs:  Symmetrical.  CTAB with no W/R/R.  Good air movement   Cardiovascular:  S1/S2 RRR without M/R/G.  Abdomen:  Abdomen is soft NT/ND. +BS. No masses noted, no splenomegaly or hepatomegaly.  Extremities:  Full range of motion. No gross deformities noted. 2+ pulses in all extremities.   Neuro:  Muscle tone is normal. Cranial nerves II-XII grossly intact.     FOLLOW UP ITEMS POST DISCHARGE  Follow up with PCP for continued Sickle Cell Care and pain management    DISCHARGE DIAGNOSES  Principal Problem:    Acute sickle cell crisis (HCC) (POA: Yes)  Resolved Problems:    * No resolved hospital problems. *      FOLLOW UP  No future appointments.  No follow-up provider specified.    MEDICATIONS ON DISCHARGE     Medication List        CONTINUE taking these medications        Instructions   acetaminophen 500 MG Tabs  Commonly known as: Tylenol   Take 500 mg by mouth every 6 hours as needed  for Moderate Pain.  Dose: 500 mg     fluticasone 44 MCG/ACT Aero  Commonly known as: Flovent HFA   Inhale 2 Puffs 2 times a day as needed (SOB).  Dose: 2 Puff     folic acid 1 MG Tabs  Commonly known as: Folvite   Take 1 Tab by mouth every day.  Dose: 1 mg     hydroxyurea 500 MG Caps  Commonly known as: Hydrea   Take 1,500 mg by mouth every day. 3 caps = 1500mg  Dose: 1,500 mg     loratadine 10 MG Tabs  Commonly known as: Claritin   Take 10 mg by mouth 1 time a day as needed (allergies).  Dose: 10 mg     oxyCODONE-acetaminophen 5-325 MG Tabs  Commonly known as: Percocet   Take 1 Tablet by mouth every four hours as needed for Moderate Pain or Severe Pain for up to 5 days.  Dose: 1 Tablet     penicillin v potassium 250 MG Tabs  Commonly known as: Veetid   Take 1 Tab by mouth 2 times a day.  Dose: 250 mg     Ventolin  (90 Base) MCG/ACT Aers inhalation aerosol  Generic drug: albuterol   Doctor's comments: DX Code Needed  PATIENT NEEDS REFILLS PLEASE. THANK YOU..  INHALE 2 PUFFS BY MOUTH EVERY 6 HOURS AS NEEDED FOR SHORTNESS OF BREATH  Dose: 2 Puff     vitamin D3 125 MCG (5000 UT) Caps   Take 5,000 Units by mouth every day.  Dose: 5,000 Units            STOP taking these medications      ibuprofen 200 MG Tabs  Commonly known as: Motrin              Allergies  Allergies   Allergen Reactions    Haloperidol Unspecified     Received Haldol x 2 doses in [facility] and developed an acute dystonic reaction, treated with diphenhydramine       DIET  Orders Placed This Encounter   Procedures    Diet Order Diet: Regular     Standing Status:   Standing     Number of Occurrences:   1     Order Specific Question:   Diet:     Answer:   Regular [1]       ACTIVITY  As tolerated.  Weight bearing as tolerated    CONSULTATIONS  none    PROCEDURES  none    LABORATORY  Lab Results   Component Value Date    SODIUM 136 11/22/2024    POTASSIUM 4.4 11/22/2024    CHLORIDE 104 11/22/2024    CO2 21 11/22/2024    GLUCOSE 104 (H) 11/22/2024     BUN 7 (L) 11/22/2024    CREATININE 0.65 11/22/2024        Lab Results   Component Value Date    WBC 16.2 (H) 11/24/2024    HEMOGLOBIN 8.0 (L) 11/24/2024    HEMATOCRIT 21.6 (L) 11/24/2024    PLATELETCT 309 11/24/2024        Total time of the discharge process exceeds 60 minutes.    Marc Huggins MD  PGY1  [facility]

## 2024-11-24 NOTE — PROGRESS NOTES
-pt was discharged earlier today by [facility] team  -I was called by RTOC to re-admit pt  -went to bedside in ED and discussed admission with pt who was adamant that she did not want [facility] team as part of her care at this time, yet wants to be admitted; I notified [facility] and they will attempt admit w/ a different provider  -I notified ED provider that we are respectfully declining the admit given pt is refusing our team's care at this time

## 2024-11-24 NOTE — PROGRESS NOTES
4 Eyes Skin Assessment Completed by BRITTANY Martinez and BRITTANY Fagan.    Head WDL  Ears WDL  Nose WDL  Mouth WDL  Neck WDL  Breast/Chest WDL  Shoulder Blades WDL  Spine WDL  (R) Arm/Elbow/Hand WDL  (L) Arm/Elbow/Hand WDL  Abdomen WDL  Groin WDL  Scrotum/Coccyx/Buttocks WDL  (R) Leg WDL  (L) Leg WDL  (R) Heel/Foot/Toe Calloused, Dry   (L) Heel/Foot/Toe Calloused, Dry          Devices In Places NA      Interventions In Place N/A    Possible Skin Injury No    Pictures Uploaded Into Epic Yes  Wound Consult Placed N/A  RN Wound Prevention Protocol Ordered No

## 2024-11-24 NOTE — CARE PLAN
The patient is Stable - Low risk of patient condition declining or worsening    Shift Goals  Clinical Goals: Pain control  Patient Goals: Pain management  Family Goals: Decrease pain    Progress made toward(s) clinical / shift goals:     Patient is not progressing towards the following goals: Patient refused treatment plan and wants higher dose of pain medication. Education provided on PCA infusion by provider however, patient refused. Patient was upset and became verbally aggressive towards nurses and provider. All needs provided.       Problem: Pain - Standard  Goal: Alleviation of pain or a reduction in pain to the patient’s comfort goal  Description: Target End Date:  Prior to discharge or change in level of care    Outcome: Not Progressing     Problem: Knowledge Deficit - Standard  Goal: Patient and family/care givers will demonstrate understanding of plan of care, disease process/condition, diagnostic tests and medications  Description: Target End Date:  1-3 days or as soon as patient condition allows    Outcome: Not Progressing

## 2024-11-24 NOTE — PROGRESS NOTES
"Received notice from nursing staff at approximately 11:05PM that patient's pain continues to be severe despite oxycodone, toradol, and dilaudid prn. Current doses were 10mg oxycodone q3h prn, 1mg IV dilaudid q3h prn for breakthrough, and toradol 15mg q6h prn. Patient had received total of about 5mg IV dilaudid, 30mg oxycodone, and 15mg toradol since admission with pain continuing to be uncontrolled.     Reviewed charts from last hospital admission, appears that patient previously was on dilaudid PCA pump during that admission with improvement in pain control. Reviewed previous admission PCA pump settings and opioid medication dosages with pharmacist on call who agreed with starting dilaudid PCA pump during this admission with basal rate settings of 0.4mg/hr, patient bolus dose of 0.5mg, bolus lockout interval of 30min, and four hour dose limit of 5mg. These were settings initiated during patient's last admission for sickle cell pain crisis approximately 1 year ago.     Went to bedside to discuss plan with patient. Discussed use of PCA for continuous baseline pain management with as needed boluses for breakthrough. Patient declines PCA at this time, prefers to start with scheduled IV dilaudid as she had the most improvement with 1mg IV dilaudid when in ED. Discussed dosage of scheduled dilaudid with pharmacist on call. Original plan for 1mg IV dilaudid every 2 hours scheduled but due to concerns for inability to closely monitor patient on current floor will initiate 1mg IV dilaudid every 2 hours as needed. Will also add on maintenance fluids with NS at 95ml/hr given decreased PO intake of fluids.     /82   Pulse 95   Temp 37.7 °C (99.9 °F) (Temporal)   Resp 17   Ht 1.575 m (5' 2\")   Wt 54.5 kg (120 lb 2.4 oz)   SpO2 90%   BMI 21.98 kg/m²   Patient stable at bedside, ambulating in room. Nasal cannula not in place. Reports significant increase in pain in bilateral knees and shins to 10/10 in severity. " Denies any chest pain, shortness of breath.

## 2024-11-24 NOTE — PROGRESS NOTES
Called to bedside again for severe pain at 02:12AM. Went to bedside to evaluate. Patient alert, pacing in room. States that she has continued to have severely elevated pain and is requesting for more frequent or higher dose pain medication. Discussed with pharmacy on call. Presented patient with options for either adding on scheduled oxycodone and increasing dosage of IV dilaudid to 1.5mg q2h or transitioning to Dilaudid PCA. Revisited discussion with patient regarding purpose of PCA and providing continuous basal dosage of dilaudid with its use. Also discussed with patient short acting nature of IV dilaudid. Patient declines PCA as well as scheduled PO oxycodone at this time. Increased dosage of IV dilaudid to 1.5mg q2h.     Patient also refusing use of IV pump for maintenance IV fluids. Discussed with charge RN who consulted on policies on the floor, states we are unable to provide these IV fluids without IV pump for maintaining consistent rate. Patient declines IV fluids unless they can be set to gravity without pump.

## 2024-11-24 NOTE — PROGRESS NOTES
Charge nurse said NAM said pt is medically cleared and needs to DC, pt cannot get new doctor at this point in her POC because [facility] is deeming her medically cleared, security notified at same time of this RN.

## 2024-11-24 NOTE — PROGRESS NOTES
Communication with Marc Huggins pt is demanding to speak to you right now, pt is very angry and wants her dilaudid more often. This RN notified he is reaching out to pharmacy to see what can be done for additional pain management.

## 2024-11-24 NOTE — PROGRESS NOTES
"Assumed care of patient to floor. Patient ambulated from gurney to bed without assistance. Satting 93% on room air. Attempted to complete bolus via the pump - the patient is refusing to receive fluids via the pump at this time because \"last time my legs swelled up like crazy I ain't using no pump\". Charge nurse joined this rn at bedside - patient with complaints about being in shared room. Leadership dealing with issue. Mother of patient at bedside. Reporting 9/10 pain - stating\"yeah I just got my oxy downstairs.\" Will continue to monitor. Call light within reach.   "

## 2024-11-24 NOTE — ASSESSMENT & PLAN NOTE
Acute, moderate with pain limited to limbs and stable Hgb at 8.0.  No evidence at this time of splenic sequestration and physical exam was very reassuring.  Patient actively treated with hydroxyurea 1500 mg daily with reported medication adherence. Pain not controlled repeated escalating prn IV Dilaudid pushes and not taking Oxycodone. Refusing PCA at first (thought was Morphine) but agrees to start PCA Dilaudid pump with low rate mIVF (concern for leg swelling last time got IVF last hospitalization). Hemoglobin drop from 8.0 to 7.7 about 12h later overnight. 11/25 morning CBC pending.     Plan:  - Admit for pain management  - Continue home hydroxyurea  -- Start Dilaudid PCA Pump   -basal 0.4mg/h   -Patient bolus 0.5mg (30 min lock out period)   -4h dose max of 5mg  --mIVF low rate 25cc/h NS  -f/u H/H  - Trend Hgb, transfuse for Hgb less than 7  - Monitor for any signs of infection, low threshold for starting antibiotics

## 2024-11-24 NOTE — PROGRESS NOTES
Notified Marc Huggins pt was discussing PCA with supervisor Rn and sounds interested in dilaudid PCA. Review pt was previously refusing and this RN notified that it was attempted for pt by residents and pharmacy, education provided to pt with pharmacy and residents. Notified pt stated she refused to this RN because she thought it was available for morphine only. Resident to come to bedside and provide education. Review pt had refused labs in am and was refusing continuous IV fluids, resident states aware. Marc Huggins stated PCA ordered and pt okay with low rate IVF, but pt refused when this RN attempted to provide.

## 2024-11-24 NOTE — PROGRESS NOTES
Patient became very upset and verbally aggressive concerning her prn Dilaudid q2h. Patient wanted to talk to the Provider and wanted to increase her dose. Patient very agitated and inconsolable. Provider and charge nurse notified. Provider came at bedside and discussed the treatment with the patient. Patient's mother at bedside as well. Patient continues to cry and scream and refused the  treatment plan. Patient is also refusing am lab work. Patient wanted to talk to  for her concern. Patient refused IVF using IV pump and wanted to have it run via gravity. Education provided and patient and the mother were very persistent of not using the IV pump. Mother reports that patient had previous experience that her leg was swollen when she had an IVF infusing with the pump. Charge nurse and Provider continues to explain and educate patient regarding IVF with the use of pumps and patient end up refusing to have IVF running. Provider and charge nurse came out in her room and after that she has been pushing the staff assist button every second for 10-15 mins. All needs attended at this time. Charge nurse call the security and have them talk to the patient.

## 2024-11-24 NOTE — PROGRESS NOTES
Provider at bedside, providing education about PCA infusion, however patient refused and wants increase dosage of IV pain meds. Patient also refused oxycodone and toradol. Patient reports those meds not helping at all. New orders placed.

## 2024-11-24 NOTE — PROGRESS NOTES
"Patient complains that her pain is not being properly controlled and labs are not being drawn as frequent. MD went to patient's bedside at least twice during the shift, adjusting pain medication. MD offered to put patient on PCA pump but patient refused, wanted to have IV Dilaudid 1mg every hour instead. Patient does not give staff opportunity to explain, or listen to explanation. Refused IV fluids using IV pump, claims that in the past, she received IV fluids with pump that resulted in her having swollen lower extremities. MD and this writer tried to explain, patient continue to argue stating, \"you don't matter to me\" referring to this writer. Notified NAM and agrees the IV pump must be used. Security had to be called in early morning when patient continuously pressed staff assist button to call for assistance. Patient requested to speak with social work this writer requested the assistance of ED social work who came to bedside with the NAM and spoke with patient and her mother.   "

## 2024-11-24 NOTE — ED NOTES
Provided pt with cranberry juice per request, call light within reach. Requested more pain meds, ERP notified.

## 2024-11-25 ENCOUNTER — PHARMACY VISIT (OUTPATIENT)
Dept: PHARMACY | Facility: MEDICAL CENTER | Age: 24
End: 2024-11-25
Payer: COMMERCIAL

## 2024-11-25 VITALS
OXYGEN SATURATION: 92 % | TEMPERATURE: 98.2 F | HEART RATE: 80 BPM | DIASTOLIC BLOOD PRESSURE: 63 MMHG | RESPIRATION RATE: 18 BRPM | SYSTOLIC BLOOD PRESSURE: 118 MMHG | WEIGHT: 120.15 LBS | HEIGHT: 62 IN | BODY MASS INDEX: 22.11 KG/M2

## 2024-11-25 PROBLEM — D57.00 SICKLE CELL CRISIS (HCC): Status: RESOLVED | Noted: 2022-12-14 | Resolved: 2024-11-25

## 2024-11-25 PROBLEM — D57.00: Status: ACTIVE | Noted: 2024-11-25

## 2024-11-25 LAB
ABO GROUP BLD: NORMAL
ALBUMIN SERPL BCP-MCNC: 3.7 G/DL (ref 3.2–4.9)
ALBUMIN/GLOB SERPL: 1.3 G/DL
ALP SERPL-CCNC: 100 U/L (ref 30–99)
ALT SERPL-CCNC: 36 U/L (ref 2–50)
ANION GAP SERPL CALC-SCNC: 9 MMOL/L (ref 7–16)
ANISOCYTOSIS BLD QL SMEAR: ABNORMAL
APPEARANCE UR: CLEAR
AST SERPL-CCNC: 50 U/L (ref 12–45)
B PARAP IS1001 DNA NPH QL NAA+NON-PROBE: NOT DETECTED
B PERT.PT PRMT NPH QL NAA+NON-PROBE: NOT DETECTED
BACTERIA #/AREA URNS HPF: NORMAL /HPF
BARCODED ABORH UBTYP: 5100
BARCODED PRD CODE UBPRD: NORMAL
BARCODED UNIT NUM UBUNT: NORMAL
BASOPHILS # BLD AUTO: 0.2 % (ref 0–1.8)
BASOPHILS # BLD: 0.03 K/UL (ref 0–0.12)
BILIRUB SERPL-MCNC: 3 MG/DL (ref 0.1–1.5)
BILIRUB UR QL STRIP.AUTO: NEGATIVE
BLD GP AB SCN SERPL QL: NORMAL
BUN SERPL-MCNC: 7 MG/DL (ref 8–22)
C PNEUM DNA NPH QL NAA+NON-PROBE: NOT DETECTED
CALCIUM ALBUM COR SERPL-MCNC: 8.3 MG/DL (ref 8.5–10.5)
CALCIUM SERPL-MCNC: 8.1 MG/DL (ref 8.5–10.5)
CASTS URNS QL MICRO: NORMAL /LPF (ref 0–2)
CHLORIDE SERPL-SCNC: 104 MMOL/L (ref 96–112)
CO2 SERPL-SCNC: 25 MMOL/L (ref 20–33)
COLOR UR: YELLOW
COMPONENT R 8504R: NORMAL
CREAT SERPL-MCNC: 0.5 MG/DL (ref 0.5–1.4)
EOSINOPHIL # BLD AUTO: 0.16 K/UL (ref 0–0.51)
EOSINOPHIL NFR BLD: 1.3 % (ref 0–6.9)
EPITHELIAL CELLS 1715: NORMAL /HPF (ref 0–5)
ERYTHROCYTE [DISTWIDTH] IN BLOOD BY AUTOMATED COUNT: 71.8 FL (ref 35.9–50)
FLUAV RNA NPH QL NAA+NON-PROBE: NOT DETECTED
FLUBV RNA NPH QL NAA+NON-PROBE: NOT DETECTED
GFR SERPLBLD CREATININE-BSD FMLA CKD-EPI: 135 ML/MIN/1.73 M 2
GLOBULIN SER CALC-MCNC: 2.8 G/DL (ref 1.9–3.5)
GLUCOSE SERPL-MCNC: 102 MG/DL (ref 65–99)
GLUCOSE UR STRIP.AUTO-MCNC: NEGATIVE MG/DL
HADV DNA NPH QL NAA+NON-PROBE: NOT DETECTED
HCOV 229E RNA NPH QL NAA+NON-PROBE: NOT DETECTED
HCOV HKU1 RNA NPH QL NAA+NON-PROBE: NOT DETECTED
HCOV NL63 RNA NPH QL NAA+NON-PROBE: NOT DETECTED
HCOV OC43 RNA NPH QL NAA+NON-PROBE: NOT DETECTED
HCT VFR BLD AUTO: 17.9 % (ref 37–47)
HCT VFR BLD AUTO: 18.2 % (ref 37–47)
HCT VFR BLD AUTO: 18.9 % (ref 37–47)
HCT VFR BLD AUTO: 19.5 % (ref 37–47)
HGB BLD-MCNC: 6.6 G/DL (ref 12–16)
HGB BLD-MCNC: 6.6 G/DL (ref 12–16)
HGB BLD-MCNC: 6.9 G/DL (ref 12–16)
HGB BLD-MCNC: 7.1 G/DL (ref 12–16)
HGB RETIC QN AUTO: 25.6 PG/CELL (ref 29–35)
HMPV RNA NPH QL NAA+NON-PROBE: NOT DETECTED
HPIV1 RNA NPH QL NAA+NON-PROBE: NOT DETECTED
HPIV2 RNA NPH QL NAA+NON-PROBE: NOT DETECTED
HPIV3 RNA NPH QL NAA+NON-PROBE: NOT DETECTED
HPIV4 RNA NPH QL NAA+NON-PROBE: NOT DETECTED
HYPOCHROMIA BLD QL SMEAR: ABNORMAL
IMM GRANULOCYTES # BLD AUTO: 0.08 K/UL (ref 0–0.11)
IMM GRANULOCYTES NFR BLD AUTO: 0.6 % (ref 0–0.9)
IMM RETICS NFR: 21.5 % (ref 2.6–16.1)
KETONES UR STRIP.AUTO-MCNC: NEGATIVE MG/DL
LDH SERPL L TO P-CCNC: 508 U/L (ref 107–266)
LEUKOCYTE ESTERASE UR QL STRIP.AUTO: ABNORMAL
LYMPHOCYTES # BLD AUTO: 5.03 K/UL (ref 1–4.8)
LYMPHOCYTES NFR BLD: 40.1 % (ref 22–41)
M PNEUMO DNA NPH QL NAA+NON-PROBE: NOT DETECTED
MACROCYTES BLD QL SMEAR: ABNORMAL
MCH RBC QN AUTO: 33 PG (ref 27–33)
MCHC RBC AUTO-ENTMCNC: 36.5 G/DL (ref 32.2–35.5)
MCV RBC AUTO: 90.4 FL (ref 81.4–97.8)
MICRO URNS: ABNORMAL
MONOCYTES # BLD AUTO: 1.67 K/UL (ref 0–0.85)
MONOCYTES NFR BLD AUTO: 13.3 % (ref 0–13.4)
NEUTROPHILS # BLD AUTO: 5.57 K/UL (ref 1.82–7.42)
NEUTROPHILS NFR BLD: 44.5 % (ref 44–72)
NITRITE UR QL STRIP.AUTO: NEGATIVE
NRBC # BLD AUTO: 0.8 K/UL
NRBC BLD-RTO: 6.4 /100 WBC (ref 0–0.2)
PH UR STRIP.AUTO: 5.5 [PH] (ref 5–8)
PLATELET # BLD AUTO: 267 K/UL (ref 164–446)
PMV BLD AUTO: 9.5 FL (ref 9–12.9)
POIKILOCYTOSIS BLD QL SMEAR: ABNORMAL
POLYCHROMASIA BLD QL SMEAR: ABNORMAL
POTASSIUM SERPL-SCNC: 4.1 MMOL/L (ref 3.6–5.5)
PROCALCITONIN SERPL-MCNC: 0.23 NG/ML
PRODUCT TYPE UPROD: NORMAL
PROT SERPL-MCNC: 6.5 G/DL (ref 6–8.2)
PROT UR QL STRIP: NEGATIVE MG/DL
RBC # BLD AUTO: 2.09 M/UL (ref 4.2–5.4)
RBC # URNS HPF: NORMAL /HPF (ref 0–2)
RBC BLD AUTO: PRESENT
RBC UR QL AUTO: NEGATIVE
RETICS # AUTO: 0.52 M/UL (ref 0.04–0.12)
RETICS/RBC NFR: 25.7 % (ref 0.8–2.6)
RH BLD: NORMAL
RSV RNA NPH QL NAA+NON-PROBE: NOT DETECTED
RV+EV RNA NPH QL NAA+NON-PROBE: NOT DETECTED
SARS-COV-2 RNA NPH QL NAA+NON-PROBE: NOTDETECTED
SCCMEC + MECA PNL NOSE NAA+PROBE: NEGATIVE
SICKLE CELLS BLD QL SMEAR: ABNORMAL
SODIUM SERPL-SCNC: 138 MMOL/L (ref 135–145)
SP GR UR STRIP.AUTO: 1.01
TARGETS BLD QL SMEAR: ABNORMAL
UNIT STATUS USTAT: NORMAL
UROBILINOGEN UR STRIP.AUTO-MCNC: 1 EU/DL
WBC # BLD AUTO: 12.5 K/UL (ref 4.8–10.8)
WBC #/AREA URNS HPF: NORMAL /HPF

## 2024-11-25 PROCEDURE — RXMED WILLOW AMBULATORY MEDICATION CHARGE: Performed by: STUDENT IN AN ORGANIZED HEALTH CARE EDUCATION/TRAINING PROGRAM

## 2024-11-25 PROCEDURE — 700102 HCHG RX REV CODE 250 W/ 637 OVERRIDE(OP): Performed by: STUDENT IN AN ORGANIZED HEALTH CARE EDUCATION/TRAINING PROGRAM

## 2024-11-25 PROCEDURE — 700105 HCHG RX REV CODE 258: Performed by: STUDENT IN AN ORGANIZED HEALTH CARE EDUCATION/TRAINING PROGRAM

## 2024-11-25 PROCEDURE — 80053 COMPREHEN METABOLIC PANEL: CPT

## 2024-11-25 PROCEDURE — 700101 HCHG RX REV CODE 250: Performed by: STUDENT IN AN ORGANIZED HEALTH CARE EDUCATION/TRAINING PROGRAM

## 2024-11-25 PROCEDURE — 83615 LACTATE (LD) (LDH) ENZYME: CPT

## 2024-11-25 PROCEDURE — 30233N1 TRANSFUSION OF NONAUTOLOGOUS RED BLOOD CELLS INTO PERIPHERAL VEIN, PERCUTANEOUS APPROACH: ICD-10-PCS | Performed by: STUDENT IN AN ORGANIZED HEALTH CARE EDUCATION/TRAINING PROGRAM

## 2024-11-25 PROCEDURE — 99239 HOSP IP/OBS DSCHRG MGMT >30: CPT | Performed by: STUDENT IN AN ORGANIZED HEALTH CARE EDUCATION/TRAINING PROGRAM

## 2024-11-25 PROCEDURE — A9270 NON-COVERED ITEM OR SERVICE: HCPCS | Performed by: STUDENT IN AN ORGANIZED HEALTH CARE EDUCATION/TRAINING PROGRAM

## 2024-11-25 PROCEDURE — 85046 RETICYTE/HGB CONCENTRATE: CPT

## 2024-11-25 PROCEDURE — 84145 PROCALCITONIN (PCT): CPT

## 2024-11-25 PROCEDURE — 99497 ADVNCD CARE PLAN 30 MIN: CPT | Performed by: STUDENT IN AN ORGANIZED HEALTH CARE EDUCATION/TRAINING PROGRAM

## 2024-11-25 PROCEDURE — P9016 RBC LEUKOCYTES REDUCED: HCPCS

## 2024-11-25 PROCEDURE — 85018 HEMOGLOBIN: CPT | Mod: 91

## 2024-11-25 PROCEDURE — 85014 HEMATOCRIT: CPT

## 2024-11-25 PROCEDURE — 36415 COLL VENOUS BLD VENIPUNCTURE: CPT

## 2024-11-25 PROCEDURE — 86901 BLOOD TYPING SEROLOGIC RH(D): CPT

## 2024-11-25 PROCEDURE — 99498 ADVNCD CARE PLAN ADDL 30 MIN: CPT | Performed by: STUDENT IN AN ORGANIZED HEALTH CARE EDUCATION/TRAINING PROGRAM

## 2024-11-25 PROCEDURE — 86900 BLOOD TYPING SEROLOGIC ABO: CPT

## 2024-11-25 PROCEDURE — 700111 HCHG RX REV CODE 636 W/ 250 OVERRIDE (IP): Mod: JZ | Performed by: STUDENT IN AN ORGANIZED HEALTH CARE EDUCATION/TRAINING PROGRAM

## 2024-11-25 PROCEDURE — 700102 HCHG RX REV CODE 250 W/ 637 OVERRIDE(OP)

## 2024-11-25 PROCEDURE — 81001 URINALYSIS AUTO W/SCOPE: CPT

## 2024-11-25 PROCEDURE — A9270 NON-COVERED ITEM OR SERVICE: HCPCS

## 2024-11-25 PROCEDURE — 85025 COMPLETE CBC W/AUTO DIFF WBC: CPT

## 2024-11-25 PROCEDURE — 36430 TRANSFUSION BLD/BLD COMPNT: CPT

## 2024-11-25 PROCEDURE — 86923 COMPATIBILITY TEST ELECTRIC: CPT

## 2024-11-25 PROCEDURE — 86850 RBC ANTIBODY SCREEN: CPT

## 2024-11-25 PROCEDURE — 86902 BLOOD TYPE ANTIGEN DONOR EA: CPT

## 2024-11-25 RX ORDER — HYDROXYZINE HYDROCHLORIDE 25 MG/1
25 TABLET, FILM COATED ORAL ONCE
Status: COMPLETED | OUTPATIENT
Start: 2024-11-25 | End: 2024-11-25

## 2024-11-25 RX ORDER — OXYCODONE HYDROCHLORIDE 10 MG/1
10 TABLET ORAL EVERY 8 HOURS PRN
Qty: 9 TABLET | Refills: 0 | Status: SHIPPED | OUTPATIENT
Start: 2024-11-25 | End: 2024-11-28

## 2024-11-25 RX ORDER — LIDOCAINE 4 G/G
1 PATCH TOPICAL EVERY 24 HOURS
Status: DISCONTINUED | OUTPATIENT
Start: 2024-11-25 | End: 2024-11-25 | Stop reason: HOSPADM

## 2024-11-25 RX ORDER — AMOXICILLIN 250 MG
2 CAPSULE ORAL EVERY EVENING
Status: DISCONTINUED | OUTPATIENT
Start: 2024-11-25 | End: 2024-11-25 | Stop reason: HOSPADM

## 2024-11-25 RX ORDER — AMOXICILLIN 250 MG
2 CAPSULE ORAL EVERY EVENING
Qty: 30 TABLET | Refills: 0 | Status: SHIPPED | OUTPATIENT
Start: 2024-11-26

## 2024-11-25 RX ORDER — AMOXICILLIN 250 MG
2 CAPSULE ORAL ONCE
Status: COMPLETED | OUTPATIENT
Start: 2024-11-25 | End: 2024-11-25

## 2024-11-25 RX ORDER — DEXTROSE MONOHYDRATE, SODIUM CHLORIDE, AND POTASSIUM CHLORIDE 50; 1.49; 4.5 G/1000ML; G/1000ML; G/1000ML
INJECTION, SOLUTION INTRAVENOUS CONTINUOUS
Status: DISCONTINUED | OUTPATIENT
Start: 2024-11-25 | End: 2024-11-25 | Stop reason: HOSPADM

## 2024-11-25 RX ORDER — POLYETHYLENE GLYCOL 3350 17 G/17G
17 POWDER, FOR SOLUTION ORAL DAILY
Qty: 3 PACKET | Refills: 0 | Status: SHIPPED | OUTPATIENT
Start: 2024-11-26 | End: 2024-11-29

## 2024-11-25 RX ORDER — POLYETHYLENE GLYCOL 3350 17 G/17G
1 POWDER, FOR SOLUTION ORAL DAILY
Status: DISCONTINUED | OUTPATIENT
Start: 2024-11-25 | End: 2024-11-25 | Stop reason: HOSPADM

## 2024-11-25 RX ORDER — LIDOCAINE 4 G/G
1 PATCH TOPICAL EVERY 24 HOURS
Qty: 5 PATCH | Refills: 0 | Status: SHIPPED | OUTPATIENT
Start: 2024-11-26 | End: 2024-12-01

## 2024-11-25 RX ADMIN — IBUPROFEN 800 MG: 800 TABLET, FILM COATED ORAL at 17:39

## 2024-11-25 RX ADMIN — SENNOSIDES AND DOCUSATE SODIUM 2 TABLET: 50; 8.6 TABLET ORAL at 12:29

## 2024-11-25 RX ADMIN — HYDROXYUREA 1500 MG: 500 CAPSULE ORAL at 06:12

## 2024-11-25 RX ADMIN — SODIUM CHLORIDE: 9 INJECTION, SOLUTION INTRAVENOUS at 05:10

## 2024-11-25 RX ADMIN — IBUPROFEN 800 MG: 800 TABLET, FILM COATED ORAL at 12:29

## 2024-11-25 RX ADMIN — HYDROXYZINE HYDROCHLORIDE 25 MG: 25 TABLET, FILM COATED ORAL at 06:11

## 2024-11-25 RX ADMIN — ACETAMINOPHEN 1000 MG: 500 TABLET ORAL at 12:29

## 2024-11-25 RX ADMIN — IBUPROFEN 800 MG: 800 TABLET, FILM COATED ORAL at 06:12

## 2024-11-25 RX ADMIN — ACETAMINOPHEN 1000 MG: 500 TABLET ORAL at 06:12

## 2024-11-25 RX ADMIN — FOLIC ACID 1 MG: 1 TABLET ORAL at 06:12

## 2024-11-25 RX ADMIN — CHOLECALCIFEROL TAB 125 MCG (5000 UNIT) 5000 UNITS: 125 TAB at 06:11

## 2024-11-25 RX ADMIN — HYDROMORPHONE HYDROCHLORIDE: 10 INJECTION, SOLUTION INTRAMUSCULAR; INTRAVENOUS; SUBCUTANEOUS at 08:53

## 2024-11-25 RX ADMIN — PIPERACILLIN AND TAZOBACTAM 4.5 G: 4; .5 INJECTION, POWDER, FOR SOLUTION INTRAVENOUS at 05:15

## 2024-11-25 RX ADMIN — PIPERACILLIN AND TAZOBACTAM 4.5 G: 4; .5 INJECTION, POWDER, FOR SOLUTION INTRAVENOUS at 12:45

## 2024-11-25 RX ADMIN — POTASSIUM CHLORIDE, DEXTROSE MONOHYDRATE AND SODIUM CHLORIDE: 150; 5; 450 INJECTION, SOLUTION INTRAVENOUS at 10:01

## 2024-11-25 SDOH — ECONOMIC STABILITY: TRANSPORTATION INSECURITY
IN THE PAST 12 MONTHS, HAS THE LACK OF TRANSPORTATION KEPT YOU FROM MEDICAL APPOINTMENTS OR FROM GETTING MEDICATIONS?: YES

## 2024-11-25 SDOH — ECONOMIC STABILITY: TRANSPORTATION INSECURITY
IN THE PAST 12 MONTHS, HAS LACK OF RELIABLE TRANSPORTATION KEPT YOU FROM MEDICAL APPOINTMENTS, MEETINGS, WORK OR FROM GETTING THINGS NEEDED FOR DAILY LIVING?: YES

## 2024-11-25 ASSESSMENT — PATIENT HEALTH QUESTIONNAIRE - PHQ9
1. LITTLE INTEREST OR PLEASURE IN DOING THINGS: NOT AT ALL
2. FEELING DOWN, DEPRESSED, IRRITABLE, OR HOPELESS: NOT AT ALL
SUM OF ALL RESPONSES TO PHQ9 QUESTIONS 1 AND 2: 0

## 2024-11-25 ASSESSMENT — PAIN DESCRIPTION - PAIN TYPE
TYPE: ACUTE PAIN

## 2024-11-25 ASSESSMENT — PAIN SCALES - PAIN ASSESSMENT IN ADVANCED DEMENTIA (PAINAD)
CONSOLABILITY: NO NEED TO CONSOLE
BODYLANGUAGE: RELAXED
BREATHING: NORMAL

## 2024-11-25 ASSESSMENT — SOCIAL DETERMINANTS OF HEALTH (SDOH)
WITHIN THE LAST YEAR, HAVE YOU BEEN HUMILIATED OR EMOTIONALLY ABUSED IN OTHER WAYS BY YOUR PARTNER OR EX-PARTNER?: NO
WITHIN THE PAST 12 MONTHS, YOU WORRIED THAT YOUR FOOD WOULD RUN OUT BEFORE YOU GOT THE MONEY TO BUY MORE: SOMETIMES TRUE
WITHIN THE LAST YEAR, HAVE YOU BEEN KICKED, HIT, SLAPPED, OR OTHERWISE PHYSICALLY HURT BY YOUR PARTNER OR EX-PARTNER?: NO
IN THE PAST 12 MONTHS, HAS THE ELECTRIC, GAS, OIL, OR WATER COMPANY THREATENED TO SHUT OFF SERVICE IN YOUR HOME?: NO
WITHIN THE LAST YEAR, HAVE YOU BEEN AFRAID OF YOUR PARTNER OR EX-PARTNER?: NO
WITHIN THE LAST YEAR, HAVE TO BEEN RAPED OR FORCED TO HAVE ANY KIND OF SEXUAL ACTIVITY BY YOUR PARTNER OR EX-PARTNER?: NO
WITHIN THE PAST 12 MONTHS, THE FOOD YOU BOUGHT JUST DIDN'T LAST AND YOU DIDN'T HAVE MONEY TO GET MORE: SOMETIMES TRUE

## 2024-11-25 ASSESSMENT — LIFESTYLE VARIABLES
CONSUMPTION TOTAL: NEGATIVE
EVER HAD A DRINK FIRST THING IN THE MORNING TO STEADY YOUR NERVES TO GET RID OF A HANGOVER: NO
TOTAL SCORE: 0
HAVE PEOPLE ANNOYED YOU BY CRITICIZING YOUR DRINKING: NO
HOW MANY TIMES IN THE PAST YEAR HAVE YOU HAD 5 OR MORE DRINKS IN A DAY: 0
ON A TYPICAL DAY WHEN YOU DRINK ALCOHOL HOW MANY DRINKS DO YOU HAVE: 0
EVER FELT BAD OR GUILTY ABOUT YOUR DRINKING: NO
HAVE YOU EVER FELT YOU SHOULD CUT DOWN ON YOUR DRINKING: NO
TOTAL SCORE: 0
AVERAGE NUMBER OF DAYS PER WEEK YOU HAVE A DRINK CONTAINING ALCOHOL: 0
TOTAL SCORE: 0
DOES PATIENT WANT TO STOP DRINKING: NO
ALCOHOL_USE: NO

## 2024-11-25 NOTE — ED NOTES
Pt to SMT via ACLS RN and monitor. Pt has all belongings at time of transfer. No belongings left in room after transfer.

## 2024-11-25 NOTE — PROGRESS NOTES
TELEMETRY STRIP SUMMARY:    Rhythm: SR-ST  HR:  bpm  Ectopy:     NC: 0.13  QRS: 0.07  QT: 0.35

## 2024-11-25 NOTE — PROGRESS NOTES
[facility] Medicine Daily Progress Note    Date of Service  11/25/2024    Chief Complaint  Henry Anand is a 24 y.o. female admitted 11/24/2024 with Hb-SS with vaso-occlusive crisis    Hospital Course  11/24: Patient readmission for persistent pain following discharge earlier on the same day.  Patient started on Dilaudid PCA.    Interval Problem Update  Pertinent labs and imaging reviewed  Afebrile, hemodynamically stable, no oxygen requirements  Voiding, stooling, tolerating oral intake well  Previous bowel movement not documented    I have discussed this patient's plan of care and discharge plan at IDT rounds today with Case Management, Nursing, Nursing leadership, and other members of the IDT team.    Consultants/Specialty  {Other providers:[id number]}    Code Status  Full Code    Disposition  Medically Cleared  I have placed the appropriate orders for post-discharge needs.    Review of Systems  ROS     Physical Exam  Temp:  [36.8 °C (98.3 °F)-38.1 °C (100.6 °F)] 37.1 °C (98.7 °F)  Pulse:  [] 66  Resp:  [10-22] 16  BP: (108-152)/(60-98) 108/63  SpO2:  [85 %-97 %] 90 %    Physical Exam    Fluids    Intake/Output Summary (Last 24 hours) at 11/25/2024 0739  Last data filed at 11/25/2024 0725  Gross per 24 hour   Intake 1875.45 ml   Output --   Net 1875.45 ml        Laboratory  Recent Labs     11/23/24  2343 11/24/24  1110 11/24/24  1955 11/25/24  0129 11/25/24  0243 11/25/24  0656   WBC 14.1* 16.2*  --  12.5*  --   --    RBC 2.39* 2.39*  --  2.09*  --   --    HEMOGLOBIN 7.7* 8.0*   < > 6.9* 7.1* 6.6*   HEMATOCRIT 21.8* 21.6*   < > 18.9* 19.5* 18.2*   MCV 91.2 90.4  --  90.4  --   --    MCH 32.2 33.5*  --  33.0  --   --    MCHC 35.3 37.0*  --  36.5*  --   --    RDW 75.0* 72.0*  --  71.8*  --   --    PLATELETCT 315 309  --  267  --   --    MPV 9.1 9.3  --  9.5  --   --     < > = values in this interval not displayed.     Recent Labs     11/22/24  2155 11/24/24  1110 11/25/24  0129   SODIUM 136 135 138    POTASSIUM 4.4 4.0 4.1   CHLORIDE 104 102 104   CO2 21 23 25   GLUCOSE 104* 110* 102*   BUN 7* 4* 7*   CREATININE 0.65 0.53 0.50   CALCIUM 9.3 8.9 8.1*                   Imaging  DX-CHEST-PORTABLE (1 VIEW)   Final Result         1.  No acute cardiopulmonary disease.           Assessment/Plan  Henry Anand is a 24 y.o. 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  As needed Percocet available  Patient to initiate pain management by way of PCA  As needed and Dilaudid available while the nursing team recruits equipment for PCA.  Scheduled senna and MiraLAX while on narcotics  Benadryl as needed for itching  High risk of deterioration  Close physiologic management while receiving narcotic medications  Continue outpatient hydroxyurea vitamin D, and folic acid  Ambulate, out of bed  Incentive spirometry    Fever  Assessment & Plan  Febrile this afternoon at 100.6 °F  Patient concerned that her recent emotional and physiologic stress is contributing to her fever  Urinalysis, blood cultures, respiratory viral panel ordered and pending  Chest x-ray returned and demonstrated no acute cardio pulmonary process.  No evidence of pleural effusion.  No evidence of pneumonia.  On empiric Zosyn now, will narrow antibiotics as cultures and results are returned    Normocytic anemia- (present on admission)  Assessment & Plan  Present  Transfuse in order to maintain hemoglobin of 7 or greater    Mild persistent asthma without complication- (present on admission)  Assessment & Plan  Continue outpatient Flovent and albuterol as needed         VTE prophylaxis:    enoxaparin ppx      I have performed a physical exam and reviewed and updated ROS and Plan today (11/25/2024).  In review of yesterday's note (11/24/2024), there are no changes except as documented above.

## 2024-11-25 NOTE — PROGRESS NOTES
4 Eyes Skin Assessment Completed by BRITTANY Salazar and BRITTANY Trujillo.    Head WDL  Ears WDL  Nose WDL  Mouth WDL  Neck WDL  Breast/Chest WDL  Shoulder Blades WDL  Spine WDL  (R) Arm/Elbow/Hand WDL  (L) Arm/Elbow/Hand WDL  Abdomen WDL  Groin WDL  Scrotum/Coccyx/Buttocks WDL  (R) Leg WDL  (L) Leg WDL  (R) Heel/Foot/Toe WDL  (L) Heel/Foot/Toe WDL          Devices In Places Oxy Mask and Tele Box      Interventions In Place Pillows    Possible Skin Injury No    Pictures Uploaded Into Epic N/A  Wound Consult Placed N/A  RN Wound Prevention Protocol Ordered No

## 2024-11-25 NOTE — PROGRESS NOTES
Pt arrived to floor via gurney. Mother at bedside. Pt ambulated to bed in room with hand-held assistance. Pt medicated for 9/10 pain. Pt states the best her pain has been since the start of this episode is 8/10.

## 2024-11-25 NOTE — ASSESSMENT & PLAN NOTE
Febrile this afternoon at 100.6 °F  Patient concerned that her recent emotional and physiologic stress is contributing to her fever  Urinalysis, blood cultures, respiratory viral panel ordered and pending  Chest x-ray returned and demonstrated no acute cardio pulmonary process.  No evidence of pleural effusion.  No evidence of pneumonia.  On empiric Zosyn now, will narrow antibiotics as cultures and results are returned

## 2024-11-25 NOTE — ASSESSMENT & PLAN NOTE
Present.  Patient to remain hydrated with IV fluid as oral intake loan is not sufficient  As needed Percocet available  Patient to initiate pain management by way of PCA  As needed and Dilaudid available while the nursing team recruits equipment for PCA.  High risk of deterioration  Close physiologic management while receiving narcotic medications  Continue outpatient hydroxyurea

## 2024-11-25 NOTE — PROGRESS NOTES
Teressa Nunez notified of critical lab value hematocrit 17.9.  Patient states she does not want a transfusion and wants to go home.

## 2024-11-25 NOTE — ASSESSMENT & PLAN NOTE
With a documented history of Hb-SS  Optimize nonpharmacologic pain control interventions including ice and hot packs, Lidoderm patches, Tylenol and Motrin.  As needed Percocet available  Patient to initiate pain management by way of PCA  As needed and Dilaudid available while the nursing team recruits equipment for PCA.  Scheduled senna and MiraLAX while on narcotics  Benadryl as needed for itching  High risk of deterioration  Close physiologic management while receiving narcotic medications  Continue outpatient hydroxyurea vitamin D, and folic acid  Ambulate, out of bed  Incentive spirometry

## 2024-11-25 NOTE — H&P
"Hospital Medicine History & Physical Note    Date of Service  11/24/2024    Primary Care Physician  Cullen Castro M.D.    Consultants  None    Specialist Names: None    Code Status  Full Code    Chief Complaint  Chief Complaint   Patient presents with    Fatigue    Pain       History of Presenting Illness  Henry Anand is a 24 y.o. female who presented 11/24/2024 with sickle cell crisis, fever, and acute hypoxemic respiratory failure.    The patient reported that she is in severe pain and is likely related to her sickle cell disease.  She reports that factors that often provoke her crises include being \"irritated.\"  The patient says that being cold, tired, hungry, and under significant physical and emotional stress contribute to her symptoms.  The patient reports that she has severe pain in her arms and legs which is a very common presentation of her sickle cell crisis.  As an outpatient, she reports taking hydroxyurea.  She has an outpatient hematologist in [place] that the most recent appointment she had with them was in July or August of this year.  The patient does have outpatient Percocet prescription, however has been out of her medication for some time.    In the emergency department, the patient's ER physician was concerned for acute sickle cell crisis.  The patient underwent blood draw which revealed leukocytosis 16.2, normocytic anemia 8.0, elevated AST at 52.  Bilirubin 2.0.  No additional imaging ordered.  Patient admitted for concern for acute sickle cell crisis requiring pain management.    I discussed the plan of care with patient, family, charge RN, and pharmacy.    Review of Systems  Review of Systems   Constitutional:  Positive for fever.   Musculoskeletal:  Positive for back pain.   Psychiatric/Behavioral:  The patient is nervous/anxious.    All other systems reviewed and are negative.      Past Medical History   has a past medical history of Asthma (2021), Gallstones with " biliary obstruction (11/2014), Heart murmur, and Sickle cell anemia without crisis (HCC) (2001).    Surgical History   has a past surgical history that includes cholecystectomy (11/3/2014); ercp (N/A, 10/30/2014); other (2018); septoturbinoplasty (Bilateral, 5/4/2021); antrostomy (5/4/2021); and ethmoidectomy, endoscopic (5/4/2021).     Family History  family history includes Anemia in her sister; Sleep Apnea in her brother, father, and mother; Stroke in her sister.   Family history reviewed with patient.     Social History   reports that she has never smoked. She has never used smokeless tobacco. She reports that she does not currently use alcohol. She reports that she does not currently use drugs after having used the following drugs: Inhaled.    Allergies  Allergies   Allergen Reactions    Haloperidol Unspecified     Received Haldol x 2 doses in [facility] and developed an acute dystonic reaction, treated with diphenhydramine       Medications  Prior to Admission Medications   Prescriptions Last Dose Informant Patient Reported? Taking?   Cholecalciferol (VITAMIN D3) 125 MCG (5000 UT) Cap 11/15/2024 Patient Yes No   Sig: Take 5,000 Units by mouth every day.   VENTOLIN  (90 Base) MCG/ACT Aero Soln inhalation aerosol 11/2/2024 Patient No No   Sig: INHALE 2 PUFFS BY MOUTH EVERY 6 HOURS AS NEEDED FOR SHORTNESS OF BREATH   acetaminophen (TYLENOL) 500 MG Tab 11/22/2024 Patient Yes No   Sig: Take 500 mg by mouth every 6 hours as needed for Moderate Pain.   fluticasone (FLOVENT HFA) 44 MCG/ACT Aerosol 11/22/2024 Patient Yes No   Sig: Inhale 2 Puffs 2 times a day as needed (SOB).   folic acid (FOLVITE) 1 MG Tab 11/13/2024 Patient No No   Sig: Take 1 Tab by mouth every day.   hydroxyurea (HYDREA) 500 MG Cap 11/13/2024 Patient Yes No   Sig: Take 1,500 mg by mouth every day. 3 caps = 1500mg   loratadine (CLARITIN) 10 MG Tab 11/2/2024 Patient Yes No   Sig: Take 10 mg by mouth 1 time a day as needed (allergies).    oxyCODONE-acetaminophen (PERCOCET) 5-325 MG Tab 11/23/2024 Patient No Yes   Sig: Take 1 Tablet by mouth every four hours as needed for Moderate Pain or Severe Pain for up to 5 days.   Patient taking differently: Take 1 Tablet by mouth every four hours as needed for Moderate Pain or Severe Pain. 5 day course started 11/23/2024   penicillin v potassium (VEETID) 250 MG Tab 11/22/2024 Patient No No   Sig: Take 1 Tab by mouth 2 times a day.   Patient taking differently: Take 250 mg by mouth 2 times a day. Daily dose from birth for infection prevention secondary to Sickle Cell      Facility-Administered Medications: None       Physical Exam  Temp:  [37 °C (98.6 °F)-38.1 °C (100.6 °F)] 37 °C (98.6 °F)  Pulse:  [] 85  Resp:  [10-22] 18  BP: (110-152)/(59-98) 152/98  SpO2:  [87 %-99 %] 92 %  Blood Pressure: (!) 152/98   Temperature: 37 °C (98.6 °F)   Pulse: 85   Respiration: 18   Pulse Oximetry: 92 %       Physical Exam  Constitutional:       General: She is awake. She is not in acute distress.     Appearance: Normal appearance. She is normal weight. She is ill-appearing.   HENT:      Head: Normocephalic and atraumatic.      Nose: Nose normal.   Eyes:      Extraocular Movements: Extraocular movements intact.   Cardiovascular:      Rate and Rhythm: Normal rate and regular rhythm.      Heart sounds: Normal heart sounds.   Pulmonary:      Effort: Pulmonary effort is normal. No respiratory distress.      Breath sounds: Normal breath sounds and air entry.   Musculoskeletal:      Cervical back: Normal range of motion.   Skin:     General: Skin is dry.      Coloration: Skin is pale.   Neurological:      General: No focal deficit present.      Mental Status: She is alert and oriented to person, place, and time.   Psychiatric:         Mood and Affect: Mood normal.         Behavior: Behavior normal.         Thought Content: Thought content normal.         Laboratory:  Recent Labs     11/23/24  1142 11/23/24  2343  "11/24/24  1110 11/24/24 1955   WBC 15.7* 14.1* 16.2*  --    RBC 2.45* 2.39* 2.39*  --    HEMOGLOBIN 8.0* 7.7* 8.0* 7.7*   HEMATOCRIT 22.9* 21.8* 21.6* 20.9*   MCV 93.5 91.2 90.4  --    MCH 32.7 32.2 33.5*  --    MCHC 34.9 35.3 37.0*  --    RDW 70.0* 75.0* 72.0*  --    PLATELETCT 364 315 309  --    MPV 9.7 9.1 9.3  --      Recent Labs     11/22/24 2155 11/24/24  1110   SODIUM 136 135   POTASSIUM 4.4 4.0   CHLORIDE 104 102   CO2 21 23   GLUCOSE 104* 110*   BUN 7* 4*   CREATININE 0.65 0.53   CALCIUM 9.3 8.9     Recent Labs     11/22/24 2155 11/23/24  1142 11/24/24  1110   ALTSGPT 19  --  32   ASTSGOT 39  --  52*   ALKPHOSPHAT 81  --  94   TBILIRUBIN 1.6*  --  2.0*   LIPASE  --  11  --    GLUCOSE 104*  --  110*         No results for input(s): \"NTPROBNP\" in the last 72 hours.      No results for input(s): \"TROPONINT\" in the last 72 hours.    Imaging:  DX-CHEST-PORTABLE (1 VIEW)   Final Result         1.  No acute cardiopulmonary disease.          X-Ray:  My impression is: There is no acute cardiopulmonary disease apparent.  There is no evidence of consolidation or pneumonia.  There is no pleural effusion.    Assessment/Plan:  Justification for Admission Status  I anticipate this patient will require at least two midnights for appropriate medical management, necessitating inpatient admission because ongoing, uncontrolled pain secondary to sickle cell crisis and hypoxemia    Patient will need a Telemetry bed on MEDICAL service .  The need is secondary to hypoxemia.    * Sickle cell crisis (HCC)- (present on admission)  Assessment & Plan  Present.  Patient to remain hydrated with IV fluid as oral intake loan is not sufficient  As needed Percocet available  Patient to initiate pain management by way of PCA  As needed and Dilaudid available while the nursing team recruits equipment for PCA.  High risk of deterioration  Close physiologic management while receiving narcotic medications  Continue outpatient " hydroxyurea    Fever  Assessment & Plan  Febrile this afternoon at 100.6 °F  Patient concerned that her recent emotional and physiologic stress is contributing to her fever  Urinalysis, blood cultures, respiratory viral panel ordered and pending  Chest x-ray returned and demonstrated no acute cardio pulmonary process.  No evidence of pleural effusion.  No evidence of pneumonia.  On empiric Zosyn now, will narrow antibiotics as cultures and results are returned    Normocytic anemia- (present on admission)  Assessment & Plan  Present  Transfuse in order to maintain hemoglobin of 7 or greater    Mild persistent asthma without complication- (present on admission)  Assessment & Plan  Continue outpatient Flovent and albuterol as needed        VTE prophylaxis: enoxaparin ppx    I provided a total of 58 minutes addressing the patient's medical and discharge needs while in the acute care setting. This patient's care required the review of medical records and diagnostic studies, direct face-to-face time with the patient and/or family, documentation, and communication with my interdisciplinary team of RNs, pharmacists, and .

## 2024-11-25 NOTE — PROGRESS NOTES
Unable to locate a PCA pump after checking in central supply and PACU, and home unit. CRN notified. MD notified for alternative pain control until PCA pump can be acquired. Orders received.

## 2024-11-25 NOTE — CARE PLAN
The patient is Watcher - Medium risk of patient condition declining or worsening    Shift Goals  Clinical Goals: pain management; monitor O2;  Patient Goals: pain management; food  Family Goals: pain management    Progress made toward(s) clinical / shift goals:    Problem: Pain - Standard  Goal: Alleviation of pain or a reduction in pain to the patient’s comfort goal  Note: PCA Dilaudid in place.      Problem: Knowledge Deficit - Standard  Goal: Patient and family/care givers will demonstrate understanding of plan of care, disease process/condition, diagnostic tests and medications  Outcome: Progressing  Note: Patient AO x4. Education on PCA pump safety in place. Patient verbalized understanding.

## 2024-11-25 NOTE — DISCHARGE PLANNING
Case Management Discharge Planning    Admission Date: 11/24/2024  GMLOS: 3.7  ALOS: 1    6-Clicks ADL Score:    6-Clicks Mobility Score:        Anticipated Discharge Dispo: Discharge Disposition: Discharged to home/self care (01)    DME Needed: Yes    DME Ordered: No    Action(s) Taken: LSW met with pt at bedside. Pt reported she would like to speak with patient experience regarding her hospital stay, not case management. Charge RN aware. Pt also expressed that she had requested a spiritual care consult days ago, and no one has come. Per review, a request was put in on 11/23, however was cancelled due to pt discharging. A new request has already been placed today.    Currently no six clicks, however pt is up self and independent at baseline. Pt lives alone, and has support from her mom who lives locally. Pt has [facility] transportation benefits if needed. Pt currently on 3L O2 and discussed likely needing O2 if she is requesting to be discharged today. Pt voiced that she does not feel she needs O2 and would not accept home O2 if ordered for her.    Escalations Completed: None    Medically Clear: No    Next Steps: Care coordination will remain available to assist with DC needs    Barriers to Discharge: Medical clearance    Is the patient up for discharge tomorrow: No    Care Transition Team Assessment    Information Source  Information Given By: Other (Comments) (chart review)  Who is responsible for making decisions for patient? : Patient    Readmission Evaluation  Is this a readmission?: Yes - unplanned readmission    Elopement Risk  Legal Hold: No  Ambulatory or Self Mobile in Wheelchair: Yes  Disoriented: No  Psychiatric Symptoms: None  History of Wandering: No  Elopement this Admit: No  Vocalizing Wanting to Leave: No  Displays Behaviors, Body Language Wanting to Leave: No-Not at Risk for Elopement  Elopement Risk: Not at Risk for Elopement    Discharge Preparedness  What is your plan after discharge?: Home with  help  What are your discharge supports?: Parent  Prior Functional Level: Ambulatory, Independent with Activities of Daily Living, Independent with Medication Management  Difficulity with ADLs: None  Difficulity with IADLs: None    Functional Assesment  Prior Functional Level: Ambulatory, Independent with Activities of Daily Living, Independent with Medication Management    Finances  Prescription Coverage: Yes    Advance Directive  Advance Directive?: None    Domestic Abuse  Have you ever been the victim of abuse or violence?: No    Psychological Assessment  History of Substance Abuse: None  History of Psychiatric Problems: No  Non-compliant with Treatment: No  Newly Diagnosed Illness: No    Discharge Risks or Barriers  Discharge risks or barriers?: Lives alone, no community support, Complex medical needs  Patient risk factors: Noncompliance, Complex medical needs, Lives alone and no community support    Anticipated Discharge Information  Discharge Disposition: Discharged to home/self care (01)

## 2024-11-25 NOTE — PROGRESS NOTES
"1900: No PCA pain pump available at the moment. MD, NAM and Charge RN aware.    2027: Call to multiple units made by NAM. There is one PCA pump that was found but unable to use because pump does not have safety lock. NAM aware and would call to other units again to find another  pump.    2118: NAM was able to acquire PCA pump with safety lock. Both [employer] out of stock of PCA Dilaudid. MAR STAT request sent. Per pharmacist, will restock [employer].    2125: Patient requesting warm blanket. Warm blanket provided and ordered Kpad.    2221: MAR message from Pharmacy that PCA dilaudid has been restocked in [employer]. This RN looked at both [employer] and it still said out of stock. Called pharmacist to confirm if PCA Dilaudid was restocked. Per pharmacist, \"That was a mistake on our part but we are sending a pharmacy tech now\".    2226: Patient complaining of generalized itching. Notified LISA Fleming. LISA Fleming ordered Benadryl once.     2306: PCA Dilaudid restocked on [employer] and started. Instructed patient that we will be checking her respiratory status every 2 hours as per policy and for safety. Patient also asked when her next blood draw would be. This RN notified patient that there is an active order for H&H Q6h and that the next draw would be at 0130. Patient verbalized understanding.     2330: Vital signs taken and patient's SPO2 is 85% on room air. Placed on O2 @ 3LPM with humidifier. Patient requesting oxymask.     0006: Patient has been intermittently seen taking O2 off with her SPO2 at 80-85% on RA. Education on importance of maintaining oxymask and keeping SPO2 to normal levels provided.     0236: Hgb of 6.9. Notified LISA Fleming. LISA Fleming ordered H&H recheck and COD. Informed patient that LISA has ordered a repeat blood draw and patient was agreeable to it. Patient complained of generalized itching again and is requesting IV Benadryl. Notified LISA Fleming. LISA Fleming placed order for one time dose of atarax. " "    0243: Phlebotomist at bedside to draw H&H recheck. Patient upset and yelling \"I don't want to get poked so many times. I want a tubing that stays on so I don't have to get poked again.\"  Informed patient that  we cannot draw labs from PIV on this unit. Patient initially refusing labs but eventually agreed to it. H&H drawn.    0300: Patient upset that lab wasn't drawn earlier \"like every hour\". Per patient, \"Why didn't we draw labs when I asked you earlier after the one last night. Nobody ever listens to me when I ask to have my blood drawn and now that it's low, you want to check it again.\" Reminded patient that she did not actually ask for her labs to be drawn and was merely asking when the next draw is. Patient continues to be upset and yelling when Charge RN came in the room.    Charge RN continued to explain the policies of drawing labs at [facility] and the active orders but patient continues to refuse education given. Patient requesting to see . IP consult placed.    0321: Notified ILSA Fleming that patient's Hgb is 7.1. No new orders received. Per patient's mom, patient usually gets irritable and feels \"bad\" when her blood count is low. Per mom, patient has gotten blood transfusion before and noticed that patient does feel better after. When this RN asked if the patient would like a blood transfusion, the patient declined and said \"I don't want a blood transfusion right now\".    Reinforced to patient that per current order, the next lab draw would be at 0840. Patient requested to have lab drawn at 0630 instead.     0429: Patient requesting to go for a walk. Explained to patient that she will need to walk with a portable O2 tank since PCA pump is still running and she is requiring O2 with it on. Patient refused to walk with portable O2 and requesting to have PCA pump paused instead. PCA pump paused and SPO2 checked while on RA. Patient's SPO2 90-94% on RA while PCA pump paused.    0608: Patient back " from her walk and requesting to have her PCA pain pump restarted. Education on importance of keeping oxymask on while PCA pump running given. Patient verbalized understanding.

## 2024-11-26 NOTE — PROGRESS NOTES
Pt discharged with mother and was able to walk to the car. Ride was downstairs and pt and family members stated that they would  medications from the [facility] pharmacy and then leave with their ride. AVS was reviews with the patient and signed by the pt and a copy was provided to the patient and family. Pt had all belongings, Ivs were removed, and needs were addressed before leaving. Pt will follow up with primary care as needed.

## 2024-11-26 NOTE — PROGRESS NOTES
Patient refusing to wear telemetry monitor at this time. Teressa Nunez hospitalist notified as well as tele monitor tech.

## 2024-11-26 NOTE — PROGRESS NOTES
Pt was expressing concern about not going home tonight. This RN reached out to the provider and the provider stated that if the walking O2 eval showed O2 sats above 92% that discharge orders would be placed. Walking O2 eval showed O2 level at 94% at the highest. Provider notified and appropriate discharge orders placed.

## 2024-11-26 NOTE — DISCHARGE SUMMARY
Discharge Summary    CHIEF COMPLAINT ON ADMISSION  Chief Complaint   Patient presents with    Fatigue    Pain       Reason for Admission  Other     Admission Date  11/24/2024    CODE STATUS  Full Code    HPI & HOSPITAL COURSE  The patient is a 24-year-old female with a past medical history significant for sickle cell anemia.  The patient was concern for extreme pain that she attributed to a vaso-occlusive crisis.  She says that the pain that she is experiencing is consistent with previous vaso-occlusive crises..  She reports that provoking factors include poor sleep, external irritants, and hunger.  She reports having a difficult experience during her previous hospital admission and subsequently being discharged with security supervision.    The patient is found to have a fever in the emergency department, the patient underwent laboratory evaluation and imaging.  On the day of discharge, there is no clear microbe contributing to her fever.  The patient said that she thinks the fever is related to her stress and pain.  The patient was empirically placed on Zosyn, which was narrowed to Augmentin.  The patient received a total of 5 days of antibiotics for empiric treatment of her fever.    On the day of discharge, the patient had a low morning hemoglobin of 6.6.  The patient said that she wanted to wait to undergo transfusion.  A subsequent hemoglobin was also 6.6.  The patient was counseled on the benefits of transfusion as she has persistently low hemoglobin levels.  The patient was informed that the blood bank at our hospital has her blood.  She expressed concern that there is a significant delay in the blood administration previously as [place]  The patient received 1 unit of blood on 11/25.    This case was discussed with the on-call oncologist who recommended the continuation of the patient's outpatient medications including hydroxyurea, vitamin D, and folic acid.   "No additional medications are recommended outside of ensuring patient pain is well-controlled.    In order to continue to ensure the patient's pain is related to the vaso-occlusive crisis with under control, the patient was provided a PCA.  The patient reported substantial improvement in her symptoms by way of PCA pump.  The PCA's pump was discontinued on the day of discharge.  The patient will be sent home with a short course of oxycodone.  Patient declined wanting to trial managing her pain with oral medication alone.    Throughout the patient her hospital admission, the patient had ongoing oxygen requirements.  The patient was counseled on this may be related to the medication versus her low hemoglobin level.  The patient initially underwent an home oxygen evaluation prior to her 11/25 transfusion which recommended that she receive 3 L of oxygen.  The patient was counseled on my concern that she will continue to require oxygen in the absence of transfusion due to lack of hemoglobin.  Unfortunately, there was no opportunity to place follow-up orders nor have a home oxygen delivered to her bedside within the timeline that would allow the oxygen to be delivered same day.  However, following the transfusion, the patient demonstrated ability to maintain an oxygen saturation of 92 to 94% while ambulating.  As such, the patient was discharged without additional home oxygen needs.    On the evening of discharge, the patient expressed concern as she wanted adamant about being discharged home.  I expressed my concern repeatedly about having the patient discharge without home oxygen.  The patient and I discussed the benefits, risks, and alternatives of being discharged home with and without oxygen.  I again expressed my concern about her having to return without oxygen as she has respiratory distress.  The patient reports that she has oxygen \"in the air\" and inhalers at home.  Additionally, she denied any symptoms of " respiratory distress and says she has known history of sleep apnea. She reported feeling well overall.  The patient was adamant about not staying following her the completion of her blood transfusion.  Again, the patient repeated a home oxygen evaluation following blood transfusion and was within an acceptable level of oxygen saturation while ambulating. ACP 61 minutes    Therefore, she is discharged in fair and stable condition to home with close outpatient follow-up.    The patient recovered much more quickly than anticipated on admission.    Discharge Date  11/25/24    FOLLOW UP ITEMS POST DISCHARGE  The patient is to follow-up with her hematologist.    Patient is to follow-up with her pain management physician within a timely manner in order to get refills of her pain medications as indicated.    The patient is to see her primary care provider.    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    MEDICATIONS ON DISCHARGE     Medication List        START taking these medications        Instructions   amoxicillin-clavulanate 875-125 MG Tabs  Commonly known as: Augmentin   Take 1 Tablet by mouth 2 times a day for 4 days.  Dose: 1 Tablet     lidocaine 4 % Ptch  Start taking on: November 26, 2024  Commonly known as: Asperflex   Place 1 Patch on the skin every 24 hours for 5 days. 12 HOURS ON, 12 HOURS OFF.  Dose: 1 Patch     oxyCODONE immediate release 10 MG immediate release tablet  Commonly known as: Roxicodone   Take 1 Tablet by mouth every 8 hours as needed for Moderate Pain for up to 3 days.  Dose: 10 mg     polyethylene glycol/lytes Pack  Start taking on: November 26, 2024  Commonly known as: Miralax   Take 1 Packet  by mouth every day for 3 days.  Dose: 17 g     senna-docusate 8.6-50 MG Tabs  Start taking on: November 26, 2024  Commonly known as: Pericolace Or Senokot S   Take 2 Tablets by mouth every evening.  Dose: 2 Tablet            CONTINUE taking these medications        Instructions   acetaminophen 500 MG Tabs  Commonly known as: Tylenol   Take 500 mg by mouth every 6 hours as needed for Moderate Pain.  Dose: 500 mg     fluticasone 44 MCG/ACT Aero  Commonly known as: Flovent HFA   Inhale 2 Puffs 2 times a day as needed (SOB).  Dose: 2 Puff     folic acid 1 MG Tabs  Commonly known as: Folvite   Take 1 Tab by mouth every day.  Dose: 1 mg     hydroxyurea 500 MG Caps  Commonly known as: Hydrea   Take 1,500 mg by mouth every day. 3 caps = 1500mg  Dose: 1,500 mg     loratadine 10 MG Tabs  Commonly known as: Claritin   Take 10 mg by mouth 1 time a day as needed (allergies).  Dose: 10 mg     Ventolin  (90 Base) MCG/ACT Aers inhalation aerosol  Generic drug: albuterol   Doctor's comments: DX Code Needed  PATIENT NEEDS REFILLS PLEASE. THANK YOU..  INHALE 2 PUFFS BY MOUTH EVERY 6 HOURS AS NEEDED FOR SHORTNESS OF BREATH  Dose: 2 Puff     vitamin D3 125 MCG (5000 UT) Caps   Take 5,000 Units by mouth every day.  Dose: 5,000 Units            ASK your doctor about these medications        Instructions   oxyCODONE-acetaminophen 5-325 MG Tabs  Commonly known as: Percocet   Take 1 Tablet by mouth every four hours as needed for Moderate Pain or Severe Pain for up to 5 days.  Dose: 1 Tablet     penicillin v potassium 250 MG Tabs  Commonly known as: Veetid   Take 1 Tab by mouth 2 times a day.  Dose: 250 mg              Allergies  Allergies   Allergen Reactions    Haloperidol Unspecified     Received Haldol x 2 doses in [facility] and developed an acute dystonic reaction, treated with diphenhydramine       DIET  Orders Placed This Encounter   Procedures    Diet Order Diet: Regular     Standing Status:   Standing     Number of  Occurrences:   1     Order Specific Question:   Diet:     Answer:   Regular [1]    Discontinue Diet Tray     Standing Status:   Standing     Number of Occurrences:   1       ACTIVITY  As tolerated.  Weight bearing as tolerated    CONSULTATIONS  Oncology    PROCEDURES  None    LABORATORY  Lab Results   Component Value Date    SODIUM 138 11/25/2024    POTASSIUM 4.1 11/25/2024    CHLORIDE 104 11/25/2024    CO2 25 11/25/2024    GLUCOSE 102 (H) 11/25/2024    BUN 7 (L) 11/25/2024    CREATININE 0.50 11/25/2024        Lab Results   Component Value Date    WBC 12.5 (H) 11/25/2024    HEMOGLOBIN 6.6 (L) 11/25/2024    HEMATOCRIT 17.9 (LL) 11/25/2024    PLATELETCT 267 11/25/2024      DX-CHEST-PORTABLE (1 VIEW)   Final Result         1.  No acute cardiopulmonary disease.            Total time of the discharge process exceeds 74 minutes.

## 2024-11-26 NOTE — DISCHARGE INSTRUCTIONS
Activity    Resume Your Normal Activity    You may resume your normal activity as tolerated.  Rest as needed.

## 2024-12-04 NOTE — ED PROVIDER NOTES
ED Provider Note    CHIEF COMPLAINT  Chief Complaint   Patient presents with    Fatigue    Pain       EXTERNAL RECORDS REVIEWED  Reviewed the discharge summary from earlier today.  The patient fired the family practice group and very upset and came down to the emerged department for reevaluation.    HPI/ROS    OUTSIDE HISTORIAN(S):  Patient's mother is at bedside Andehist review of systems saying that the patient had a very unpleasant experience last night.    Henry Anand is a 24 y.o. female who presents sickle cell anemia.  She was admitted yesterday and then had a very rough go last night.  She was not getting the right type of food, pain medication, fluid overloaded that she was upset.  The patient's mother is at bedside saying that she was unimpressed with the staff and they were treating her daughter unwell therefore she decided to leave that groups care and come to the emerged part for another group.  The patient is complaining of overall body aches, pain, no shortness of breath.  She is asking for Dilaudid pain medication.    PAST MEDICAL HISTORY   has a past medical history of Asthma (2021), Gallstones with biliary obstruction (11/2014), Heart murmur, and Sickle cell anemia without crisis (HCC) (2001).    SURGICAL HISTORY   has a past surgical history that includes cholecystectomy (11/3/2014); ercp (N/A, 10/30/2014); other (2018); septoturbinoplasty (Bilateral, 5/4/2021); antrostomy (5/4/2021); and ethmoidectomy, endoscopic (5/4/2021).    FAMILY HISTORY  Family History   Problem Relation Age of Onset    Stroke Sister     Anemia Sister         Sickle cell    Sleep Apnea Mother     Sleep Apnea Father     Sleep Apnea Brother        SOCIAL HISTORY  Social History     Tobacco Use    Smoking status: Never    Smokeless tobacco: Never   Vaping Use    Vaping status: Never Used   Substance and Sexual Activity    Alcohol use: Not Currently    Drug use: Not Currently     Types: Inhaled      "Comment: job , 2 joints    Sexual activity: Not on file       CURRENT MEDICATIONS  Home Medications       Reviewed by Milli Christianson, Pharmacy Intern (Pharmacy Intern) on 11/24/24 at 1345  Med List Status: Complete     Medication Last Dose Status   acetaminophen (TYLENOL) 500 MG Tab 11/22/2024 Active   Cholecalciferol (VITAMIN D3) 125 MCG (5000 UT) Cap 11/15/2024 Active   fluticasone (FLOVENT HFA) 44 MCG/ACT Aerosol 11/22/2024 Active   folic acid (FOLVITE) 1 MG Tab 11/13/2024 Active   hydroxyurea (HYDREA) 500 MG Cap 11/13/2024 Active   loratadine (CLARITIN) 10 MG Tab 11/2/2024 Active   oxyCODONE-acetaminophen (PERCOCET) 5-325 MG Tab 11/23/2024 Active   penicillin v potassium (VEETID) 250 MG Tab 11/22/2024 Active   VENTOLIN  (90 Base) MCG/ACT Aero Soln inhalation aerosol 11/2/2024 Active                    ALLERGIES  Allergies   Allergen Reactions    Haloperidol Unspecified     Received Haldol x 2 doses in [facility] and developed an acute dystonic reaction, treated with diphenhydramine       PHYSICAL EXAM  VITAL SIGNS: /63   Pulse 80   Temp 36.8 °C (98.2 °F) (Temporal)   Resp 18   Ht 1.575 m (5' 2\")   Wt 54.5 kg (120 lb 2.4 oz)   LMP 11/21/2024   SpO2 92%   BMI 21.98 kg/m²      Nursing notes and vitals reviewed.  Constitutional: Well developed, Well nourished, No acute distress, Non-toxic appearance.   Thorax & Lungs: No respiratory distress, No rales, No rhonchi, No wheezing, No chest tenderness.   Abdomen: Bowel sounds normal, Soft, No tenderness, No guarding, No rebound, No masses, No pulsatile masses.   Skin: Warm, Dry, No erythema, No rash.   Extremities: No deformity, no edema, good range of motion range of motion upper lower extremes bilaterally  Neurologic: Alert & oriented x 3, no focal abnormalities noted, acting appropriately on examination  Psychiatric: Agitated affect      EKG/LABS  Reviewed the labs from earlier this morning  I have independently interpreted this " EKG    RADIOLOGY/PROCEDURES   I have independently interpreted the diagnostic imaging associated with this visit and am waiting the final reading from the radiologist.   My preliminary interpretation is as follows: Chest x-ray is negative for infiltrate    Radiologist interpretation:  DX-CHEST-PORTABLE (1 VIEW)   Final Result         1.  No acute cardiopulmonary disease.          COURSE & MEDICAL DECISION MAKING    ASSESSMENT, COURSE AND PLAN  Care Narrative: This is a pleasant 24-year-old female with sickle cell anemia.  The patient does have a longstanding history of this in the past and does have evidence of slight anemia here in the emergency but does not require transfusions.  She is having severe pain for crisis.  The patient has received pain medication here in the emergency department.  The patient is upset with the care that was rendered by the team that she had upstairs and she fired that team is asking for hospitalization with a different team.  She has no acute exacerbation requiring intubation or no evidence of CVA, no [place] overwhelming infection.  The patient was discussed at length with the hospitalist who graciously accepted the patient's care.  There was debate between the family practice and hospitalist group secondary to the patient finding the family practice group and the hospitalist graciously except hospitalization of this patient.      DISPOSITION AND DISCUSSIONS  I have discussed management of the patient with the following physicians and GABE's: Family practice who states they cannot admit the patient is a or fired by the patient and the patient's mother, hospitalist for hospitalization.      FINAL DIAGNOSIS  1. Sickle cell anemia without crisis (HCC)    2. 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 Fever, unspecified fever cause           Electronically signed by: Diego Heard D.O., 12/4/2024 10:20 AM

## (undated) DEVICE — SOD. CHL. INJ. 0.9% 250 ML - (36/CA 50CA/PF)

## (undated) DEVICE — GOWN WARMING STANDARD FLEX - (30/CA)

## (undated) DEVICE — SUTURE 4-0 PLAIN GUT SC-1 ETHICON (36PK/BX)

## (undated) DEVICE — TUBE CONNECT SUCTION CLEAR 120 X 1/4" (50EA/CA)"

## (undated) DEVICE — GOWN SURGEONS LARGE - (32/CA)

## (undated) DEVICE — SUTURE 5-0 PLAIN GUT PC-1 - (12/BX)

## (undated) DEVICE — MASK ANESTHESIA ADULT  - (100/CA)

## (undated) DEVICE — BOVIE FOOT CONTROL SUCTION - 6IN 10FR (25EA/CA)

## (undated) DEVICE — BLADE INFERIOR TURBINATE 2.9MM (5EA/PK)

## (undated) DEVICE — TOWELS CLOTH SURGICAL - (4/PK 20PK/CA)

## (undated) DEVICE — SLEEVE VASO CALF MED - (10PR/CA)

## (undated) DEVICE — TUBE TRACHEAL RAE 6.5MM (10EA/BX)

## (undated) DEVICE — ANTI-FOG SOLUTION - 60BTL/CA

## (undated) DEVICE — ELECTRODE DUAL RETURN W/ CORD - (50/PK)

## (undated) DEVICE — CANISTER SUCTION RIGID RED 1500CC (40EA/CA)

## (undated) DEVICE — TUBE CONNECTING SUCTION - CLEAR PLASTIC STERILE 72 IN (50EA/CA)

## (undated) DEVICE — CANISTER SUCTION 3000ML MECHANICAL FILTER AUTO SHUTOFF MEDI-VAC NONSTERILE LF DISP  (40EA/CA)

## (undated) DEVICE — HEAD HOLDER JUNIOR/ADULT

## (undated) DEVICE — SUTURE GENERAL

## (undated) DEVICE — SENSOR SPO2 NEO LNCS ADHESIVE (20/BX) SEE USER NOTES

## (undated) DEVICE — CATHETER IV 20 GA X 1-1/4 ---SURG.& SDS ONLY--- (50EA/BX)

## (undated) DEVICE — ELECTRODE 850 FOAM ADHESIVE - HYDROGEL RADIOTRNSPRNT (50/PK)

## (undated) DEVICE — SET LEADWIRE 5 LEAD BEDSIDE DISPOSABLE ECG (1SET OF 5/EA)

## (undated) DEVICE — KIT  I.V. START (100EA/CA)

## (undated) DEVICE — TUBING ENDOSCRUB II (5EA/PK)

## (undated) DEVICE — LACTATED RINGERS INJ 1000 ML - (14EA/CA 60CA/PF)

## (undated) DEVICE — PROTECTOR ULNA NERVE - (36PR/CA)

## (undated) DEVICE — SET EXTENSION WITH 2 PORTS (48EA/CA) ***PART #2C8610 IS A SUBSTITUTE*****

## (undated) DEVICE — PACK ENT OR - (2EA/CA)

## (undated) DEVICE — TUBING CLEARLINK DUO-VENT - C-FLO (48EA/CA)

## (undated) DEVICE — TOWEL STOP TIMEOUT SAFETY FLAG (40EA/CA)

## (undated) DEVICE — SODIUM CHL IRRIGATION 0.9% 1000ML (12EA/CA)

## (undated) DEVICE — SHEATH SHARP SITE 30 DEGREE ENDOSCRUB II (5EA/PK)

## (undated) DEVICE — PATTIES SURG X-RAYCOTTONOID - 1/2 X 3 IN (200/CA)

## (undated) DEVICE — KIT ANESTHESIA W/CIRCUIT & 3/LT BAG W/FILTER (20EA/CA)

## (undated) DEVICE — SUCTION INSTRUMENT YANKAUER BULBOUS TIP W/O VENT (50EA/CA)

## (undated) DEVICE — WATER IRRIGATION STERILE 1000ML (12EA/CA)

## (undated) DEVICE — GLOVE SZ 6.5 BIOGEL PI MICRO - PF LF (50PR/BX)